# Patient Record
Sex: FEMALE | Race: WHITE | Employment: OTHER | URBAN - METROPOLITAN AREA
[De-identification: names, ages, dates, MRNs, and addresses within clinical notes are randomized per-mention and may not be internally consistent; named-entity substitution may affect disease eponyms.]

---

## 2017-01-29 DIAGNOSIS — E78.00 HYPERCHOLESTEROLEMIA: ICD-10-CM

## 2017-01-29 RX ORDER — ATORVASTATIN CALCIUM 20 MG/1
TABLET, FILM COATED ORAL
Qty: 90 TAB | Refills: 1 | Status: SHIPPED | OUTPATIENT
Start: 2017-01-29 | End: 2017-12-08 | Stop reason: SDUPTHER

## 2017-04-14 ENCOUNTER — OFFICE VISIT (OUTPATIENT)
Dept: INTERNAL MEDICINE CLINIC | Age: 67
End: 2017-04-14

## 2017-04-14 ENCOUNTER — TELEPHONE (OUTPATIENT)
Dept: INTERNAL MEDICINE CLINIC | Age: 67
End: 2017-04-14

## 2017-04-14 ENCOUNTER — HOSPITAL ENCOUNTER (OUTPATIENT)
Dept: GENERAL RADIOLOGY | Age: 67
Discharge: HOME | End: 2017-04-14
Attending: INTERNAL MEDICINE
Payer: MEDICARE

## 2017-04-14 VITALS
SYSTOLIC BLOOD PRESSURE: 136 MMHG | BODY MASS INDEX: 23.7 KG/M2 | WEIGHT: 151 LBS | RESPIRATION RATE: 16 BRPM | DIASTOLIC BLOOD PRESSURE: 72 MMHG | OXYGEN SATURATION: 99 % | HEART RATE: 48 BPM | HEIGHT: 67 IN | TEMPERATURE: 98.8 F

## 2017-04-14 DIAGNOSIS — M25.561 RIGHT MEDIAL KNEE PAIN: ICD-10-CM
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
## 2017-04-14 DIAGNOSIS — I10 ESSENTIAL HYPERTENSION: ICD-10-CM

## 2017-04-14 DIAGNOSIS — S13.9XXA CERVICAL SPRAIN, INITIAL ENCOUNTER: Primary | ICD-10-CM

## 2017-04-14 DIAGNOSIS — S13.9XXA CERVICAL SPRAIN, INITIAL ENCOUNTER: ICD-10-CM

## 2017-04-14 PROCEDURE — 72050 X-RAY EXAM NECK SPINE 4/5VWS: CPT

## 2017-04-14 PROCEDURE — 73562 X-RAY EXAM OF KNEE 3: CPT

## 2017-04-14 RX ORDER — CYCLOBENZAPRINE HCL 10 MG
10 TABLET ORAL
Qty: 30 TAB | Refills: 0 | Status: SHIPPED | OUTPATIENT
Start: 2017-04-14 | End: 2017-12-08 | Stop reason: ALTCHOICE

## 2017-04-14 RX ORDER — AZELASTINE HYDROCHLORIDE AND FLUTICASONE PROPIONATE 137; 50 UG/1; UG/1
2 SPRAY, METERED NASAL AS NEEDED
Refills: 6 | COMMUNITY
Start: 2017-02-01 | End: 2020-03-10
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
## 2017-04-14 NOTE — PROGRESS NOTES
Patient notified no acute findings seen on neither X-ray, advised moderate arthritis seen on knee x-ray and mild arthritis on c-spine x-ray. Patient notified to use the Flexeril as planned.
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
## 2017-04-14 NOTE — PROGRESS NOTES
Notify her mild to mod arthritis in knee but no acute findings and c spine also arthritis no acute findings

## 2017-04-20 ENCOUNTER — TELEPHONE (OUTPATIENT)
Dept: INTERNAL MEDICINE CLINIC | Age: 67
End: 2017-04-20
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
## 2017-04-21 ENCOUNTER — OFFICE VISIT (OUTPATIENT)
Dept: INTERNAL MEDICINE CLINIC | Age: 67
End: 2017-04-21

## 2017-04-21 VITALS
SYSTOLIC BLOOD PRESSURE: 152 MMHG | HEART RATE: 52 BPM | WEIGHT: 151.6 LBS | TEMPERATURE: 98.7 F | RESPIRATION RATE: 16 BRPM | BODY MASS INDEX: 23.79 KG/M2 | OXYGEN SATURATION: 99 % | HEIGHT: 67 IN | DIASTOLIC BLOOD PRESSURE: 78 MMHG

## 2017-04-21 DIAGNOSIS — R00.1 BRADYCARDIA: ICD-10-CM

## 2017-04-21 DIAGNOSIS — M54.50 RIGHT-SIDED LOW BACK PAIN WITHOUT SCIATICA, UNSPECIFIED CHRONICITY: Primary | ICD-10-CM

## 2017-04-21 RX ORDER — ATENOLOL 50 MG/1
50 TABLET ORAL DAILY
Qty: 90 TAB | Refills: 2 | Status: SHIPPED | OUTPATIENT
Start: 2017-04-21 | End: 2017-12-30 | Stop reason: SDUPTHER

## 2017-04-21 NOTE — MR AVS SNAPSHOT
Visit Information Date & Time Provider Department Dept. Phone Encounter #  
 4/21/2017  7:45 AM Amelia Ross MD [facility] [address] [id number] Your Appointments 10/19/2017  2:30 PM  
Medicare Physical with Amelia Ross MD  
[facility]) Appt Note: welcome to medicare [address] [facility] [address] [address]  
[phone]  
  
   
 [address] [facility] [address] Upcoming Health Maintenance Date Due Hepatitis C Screening 1950 COLONOSCOPY 9/25/1969 DTaP/Tdap/Td series (1 - Tdap) 9/25/1972 ZOSTER VACCINE AGE 60> 9/25/2011 INFLUENZA AGE 9 TO ADULT 8/1/2016 GLAUCOMA SCREENING Q2Y 9/25/2016 Pneumococcal 65+ Low/Medium Risk (1 of 2 - PCV13) 9/25/2016 MEDICARE YEARLY EXAM 9/25/2016 BREAST CANCER SCRN MAMMOGRAM 9/8/2018 Allergies as of 4/21/2017  Review Complete On: 4/21/2017 By: Amelia Ross MD  
  
 Severity Noted Reaction Type Reactions Codeine  07/28/2009    Nausea and Vomiting Current Immunizations  Reviewed on 1/9/2013 No immunizations on file. Not reviewed this visit You Were Diagnosed With   
  
 Codes Comments Right-sided low back pain without sciatica, unspecified chronicity    -  Primary ICD-10-CM: M54.5 ICD-9-CM: 724.2 Bradycardia     ICD-10-CM: R00.1 ICD-9-CM: 427.89 Vitals BP Pulse Temp Resp Height(growth percentile) Weight(growth percentile) 152/78 (BP 1 Location: Left arm, BP Patient Position: Sitting) (!) 52 98.7 °F (37.1 °C) (Oral) 16 5' 7\" (1.702 m) 151 lb 9.6 oz (68.8 kg) SpO2 BMI OB Status Smoking Status 99% 23.74 kg/m2 Postmenopausal Passive Smoke Exposure - Never Smoker Vitals History BMI and BSA Data Body Mass Index Body Surface Area  
 23.74 kg/m 2 1.8 m 2 Preferred Pharmacy Pharmacy Name Phone [facility]/PHARMACY #[id number] Carlo De La Torre, [address] [facility] [phone] Your Updated Medication List  
  
   
This list is accurate as of: 4/21/17  8:11 AM.  Always use your most recent med list.  
  
  
  
  
 aspirin delayed-release 81 mg tablet Take  by mouth daily. atenolol 50 mg tablet Commonly known as:  TENORMIN Take 1 Tab by mouth daily. atorvastatin 20 mg tablet Commonly known as:  LIPITOR  
TAKE 1 TABLET DAILY CLARITIN 10 mg tablet Generic drug:  loratadine Take 10 mg by mouth as needed. cyclobenzaprine 10 mg tablet Commonly known as:  FLEXERIL Take 1 Tab by mouth nightly. DYMISTA 137-50 mcg/spray [place] Generic drug:  azelastine-fluticasone 2 Sprays by Nasal route as needed. hydroCHLOROthiazide 12.5 mg tablet Commonly known as:  HYDRODIURIL  
TAKE 1 TABLET DAILY  
  
 levothyroxine 100 mcg tablet Commonly known as:  SYNTHROID  
TAKE 1 TABLET DAILY BEFORE BREAKFAST  
  
 montelukast 10 mg tablet Commonly known as:  SINGULAIR  
  
 * MOTRIN 800 mg tablet Generic drug:  ibuprofen Take  by mouth every six (6) hours as needed for Pain. * ibuprofen 800 mg tablet Commonly known as:  MOTRIN Take 1 Tab by mouth every eight (8) hours as needed for Pain. raNITIdine 150 mg tablet Commonly known as:  ZANTAC TAKE 1 TABLET TWICE A DAY  
  
 telmisartan 40 mg tablet Commonly known as:  MICARDIS  
TAKE 1 TABLET DAILY  
  
 VITAMIN D2 PO Take 4,000 Units by mouth daily. * Notice: This list has 2 medication(s) that are the same as other medications prescribed for you. Read the directions carefully, and ask your doctor or other care provider to review them with you. Prescriptions Sent to Pharmacy Refills  
 atenolol (TENORMIN) 50 mg tablet 2 Sig: Take 1 Tab by mouth daily.   
 Class: Normal  
 Pharmacy: [address], [address] [place] [address] AT [address] #: [phone] Route: Oral  
  
Introducing [facility] & HEALTH SERVICES! Dear Jorge Simmons: Thank you for requesting a [email or web address] account. Our records indicate that you already have an active [email or web address] account. You can access your account anytime at [email or web address] Did you know that you can access your hospital and ER discharge instructions at any time in [email or web address]? You can also review all of your test results from your hospital stay or ER visit. Additional Information If you have questions, please visit the Frequently Asked Questions section of the [email or web address] website at [email or web address] Remember, [email or web address] is NOT to be used for urgent needs. For medical emergencies, dial 911. Now available from your iPhone and Android! Please provide this summary of care documentation to your next provider. Your primary care clinician is listed as Nomi Valdes. If you have any questions after today's visit, please call [phone].
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
## 2017-05-06 RX ORDER — ATENOLOL 25 MG/1
25 TABLET ORAL DAILY
Qty: 30 TAB | Refills: 5 | Status: SHIPPED | OUTPATIENT
Start: 2017-05-06 | End: 2017-10-31 | Stop reason: SDUPTHER

## 2017-10-18 ENCOUNTER — TELEPHONE (OUTPATIENT)
Dept: INTERNAL MEDICINE CLINIC | Age: 67
End: 2017-10-18

## 2017-10-18 NOTE — TELEPHONE ENCOUNTER
Patient unfortunately has to cancel 10/19 AMW appointment.  had an emergency triple by pass heart surgery 10/17. Echo Molina wanted to make sure PCP is aware that she would not just cancel .  Patient contact  [phone]

## 2017-10-31 RX ORDER — ATENOLOL 25 MG/1
TABLET ORAL
Qty: 30 TAB | Refills: 1 | Status: SHIPPED | OUTPATIENT
Start: 2017-10-31 | End: 2017-12-08 | Stop reason: SDUPTHER

## 2017-11-07 DIAGNOSIS — I10 ESSENTIAL HYPERTENSION WITH GOAL BLOOD PRESSURE LESS THAN 130/80: ICD-10-CM

## 2017-11-07 RX ORDER — HYDROCHLOROTHIAZIDE 12.5 MG/1
TABLET ORAL
Qty: 90 TAB | Refills: 1 | Status: SHIPPED | OUTPATIENT
Start: 2017-11-07 | End: 2017-12-08 | Stop reason: SDUPTHER

## 2017-12-08 ENCOUNTER — OFFICE VISIT (OUTPATIENT)
Dept: INTERNAL MEDICINE CLINIC | Age: 67
End: 2017-12-08

## 2017-12-08 VITALS
SYSTOLIC BLOOD PRESSURE: 138 MMHG | DIASTOLIC BLOOD PRESSURE: 70 MMHG | WEIGHT: 148 LBS | TEMPERATURE: 99.1 F | OXYGEN SATURATION: 98 % | RESPIRATION RATE: 16 BRPM | HEIGHT: 67 IN | BODY MASS INDEX: 23.23 KG/M2 | HEART RATE: 57 BPM

## 2017-12-08 DIAGNOSIS — Z00.00 MEDICARE ANNUAL WELLNESS VISIT, INITIAL: Primary | ICD-10-CM

## 2017-12-08 DIAGNOSIS — E78.00 HYPERCHOLESTEROLEMIA: ICD-10-CM

## 2017-12-08 DIAGNOSIS — I10 ESSENTIAL HYPERTENSION: ICD-10-CM

## 2017-12-08 DIAGNOSIS — M53.3 CHRONIC RIGHT SI JOINT PAIN: ICD-10-CM

## 2017-12-08 DIAGNOSIS — G89.29 CHRONIC RIGHT SI JOINT PAIN: ICD-10-CM

## 2017-12-08 DIAGNOSIS — I73.9 PVD (PERIPHERAL VASCULAR DISEASE) (HCC): ICD-10-CM

## 2017-12-08 DIAGNOSIS — E03.4 HYPOTHYROIDISM DUE TO ACQUIRED ATROPHY OF THYROID: ICD-10-CM

## 2017-12-08 DIAGNOSIS — I10 ESSENTIAL HYPERTENSION WITH GOAL BLOOD PRESSURE LESS THAN 130/80: ICD-10-CM

## 2017-12-08 DIAGNOSIS — Z12.11 COLON CANCER SCREENING: ICD-10-CM

## 2017-12-08 DIAGNOSIS — K21.9 GERD WITHOUT ESOPHAGITIS: ICD-10-CM

## 2017-12-08 DIAGNOSIS — R73.01 IFG (IMPAIRED FASTING GLUCOSE): ICD-10-CM

## 2017-12-08 DIAGNOSIS — Z12.39 BREAST CANCER SCREENING: ICD-10-CM

## 2017-12-08 RX ORDER — HYDROCHLOROTHIAZIDE 12.5 MG/1
TABLET ORAL
Qty: 90 TAB | Refills: 2 | Status: SHIPPED | OUTPATIENT
Start: 2017-12-08 | End: 2018-07-26 | Stop reason: DRUGHIGH

## 2017-12-08 RX ORDER — LEVOTHYROXINE SODIUM 100 UG/1
TABLET ORAL
Qty: 90 TAB | Refills: 1 | Status: SHIPPED | OUTPATIENT
Start: 2017-12-08 | End: 2018-06-29 | Stop reason: SDUPTHER

## 2017-12-08 RX ORDER — RANITIDINE 150 MG/1
TABLET, FILM COATED ORAL
Qty: 180 TAB | Refills: 2 | Status: SHIPPED | OUTPATIENT
Start: 2017-12-08 | End: 2018-03-08 | Stop reason: ALTCHOICE

## 2017-12-08 RX ORDER — ATENOLOL 25 MG/1
TABLET ORAL
Qty: 90 TAB | Refills: 2 | Status: SHIPPED | OUTPATIENT
Start: 2017-12-08 | End: 2018-01-03 | Stop reason: RX

## 2017-12-08 RX ORDER — ATORVASTATIN CALCIUM 20 MG/1
TABLET, FILM COATED ORAL
Qty: 90 TAB | Refills: 1 | Status: SHIPPED | OUTPATIENT
Start: 2017-12-08 | End: 2017-12-30 | Stop reason: SDUPTHER

## 2017-12-08 RX ORDER — TELMISARTAN 40 MG/1
TABLET ORAL
Qty: 90 TAB | Refills: 2 | Status: SHIPPED | OUTPATIENT
Start: 2017-12-08 | End: 2018-09-25 | Stop reason: SDUPTHER

## 2017-12-08 NOTE — PATIENT INSTRUCTIONS
Well Visit, Over 72: Care Instructions  Your Care Instructions    Physical exams can help you stay healthy. Your doctor has checked your overall health and may have suggested ways to take good care of yourself. He or she also may have recommended tests. At home, you can help prevent illness with healthy eating, regular exercise, and other steps. Follow-up care is a key part of your treatment and safety. Be sure to make and go to all appointments, and call your doctor if you are having problems. It's also a good idea to know your test results and keep a list of the medicines you take. How can you care for yourself at home? · Reach and stay at a healthy weight. This will lower your risk for many problems, such as obesity, diabetes, heart disease, and high blood pressure. · Get at least 30 minutes of exercise on most days of the week. Walking is a good choice. You also may want to do other activities, such as running, swimming, cycling, or playing tennis or team sports. · Do not smoke. Smoking can make health problems worse. If you need help quitting, talk to your doctor about stop-smoking programs and medicines. These can increase your chances of quitting for good. · Protect your skin from too much sun. When you're outdoors from 10 a.m. to 4 p.m., stay in the shade or cover up with clothing and a hat with a wide brim. Wear sunglasses that block UV rays. Even when it's cloudy, put broad-spectrum sunscreen (SPF 30 or higher) on any exposed skin. · See a dentist one or two times a year for checkups and to have your teeth cleaned. · Wear a seat belt in the car. · Limit alcohol to 2 drinks a day for men and 1 drink a day for women. Too much alcohol can cause health problems. Follow your doctor's advice about when to have certain tests. These tests can spot problems early. For men and women  · Cholesterol.  Your doctor will tell you how often to have this done based on your overall health and other things that can increase your risk for heart attack and stroke. · Blood pressure. Have your blood pressure checked during a routine doctor visit. Your doctor will tell you how often to check your blood pressure based on your age, your blood pressure results, and other factors. · Diabetes. Ask your doctor whether you should have tests for diabetes. · Vision. Experts recommend that you have yearly exams for glaucoma and other age-related eye problems. · Hearing. Tell your doctor if you notice any change in your hearing. You can have tests to find out how well you hear. · Colon cancer tests. Keep having colon cancer tests as your doctor recommends. You can have one of several types of tests. · Heart attack and stroke risk. At least every 4 to 6 years, you should have your risk for heart attack and stroke assessed. Your doctor uses factors such as your age, blood pressure, cholesterol, and whether you smoke or have diabetes to show what your risk for a heart attack or stroke is over the next 10 years. · Osteoporosis. Talk to your doctor about whether you should have a bone density test to find out whether you have thinning bones. Also ask your doctor about whether you should take calcium and vitamin D supplements. For women  · Pap test and pelvic exam. You may no longer need a Pap test. Talk with your doctor about whether to stop or continue to have Pap tests. · Breast exam and mammogram. Ask how often you should have a mammogram, which is an X-ray of your breasts. A mammogram can spot breast cancer before it can be felt and when it is easiest to treat. · Thyroid disease. Talk to your doctor about whether to have your thyroid checked as part of a regular physical exam. Women have an increased chance of a thyroid problem. For men  · Prostate exam. Talk to your doctor about whether you should have a blood test (called a PSA test) for prostate cancer.  Experts disagree on whether men should have this test. Some experts recommend that you discuss the benefits and risks of the test with your doctor. · Abdominal aortic aneurysm. Ask your doctor whether you should have a test to check for an aneurysm. You may need a test if you ever smoked or if your parent, brother, sister, or child has had an aneurysm. When should you call for help? Watch closely for changes in your health, and be sure to contact your doctor if you have any problems or symptoms that concern you. Where can you learn more? Go to http://jeff-renetta.info/. Enter V061 in the search box to learn more about \"Well Visit, Over 65: Care Instructions. \"  Current as of: May 12, 2017  Content Version: 11.4  © 0344-1740 [employer]. Care instructions adapted under license by [employer] (which disclaims liability or warranty for this information). If you have questions about a medical condition or this instruction, always ask your healthcare professional. [place] [address] any warranty or liability for your use of this information. Medicare Wellness Visit, Female    The best way to live healthy is to have a healthy lifestyle by eating a well-balanced diet, exercising regularly, limiting alcohol and stopping smoking. Regular physical exams and screening tests are another way to keep healthy. Preventive exams provided by your health care provider can find health problems before they become diseases or illnesses. Preventive services including immunizations, screening tests, monitoring and exams can help you take care of your own health. All people over age 72 should have a pneumovax  and and a prevnar shot to prevent pneumonia. These are once in a lifetime unless you and your provider decide differently. All people over 65 should have a yearly flu shot and a tetanus vaccine every 10 years.     A bone mass density to screen for osteoporosis or thinning of the bones should be done every 2 years after 72.    Screening for diabetes mellitus with a blood sugar test should be done every year. Glaucoma is a disease of the eye due to increased ocular pressure that can lead to blindness and it should be done every year by an eye professional.    Cardiovascular screening tests that check for elevated lipids (fatty part of blood) which can lead to heart disease and strokes should be done every 5 years. Colorectal screening that evaluates for blood or polyps in your colon should be done yearly as a stool test or every five years as a flexible sigmoidoscope or every 10 years as a colonoscopy up to age 76. Breast cancer screening with a mammogram is recommended biennially  for women age 54-69. Screening for cervical cancer with a pap smear and pelvic exam is recommended for women after age 72 years every 2 years up to age 79 or when the provider and patient decide to stop. If there is a history of cervical abnormalities or other increased risk for cancer then the test is recommended yearly. Hepatitis C screening is also recommended for anyone born between 80 through [address]. A shingles vaccine is also recommended once in a lifetime after age 61. Your Medicare Wellness Exam is recommended annually.     Here is a list of your current Health Maintenance items with a due date:  Health Maintenance Due   Topic Date Due    Hepatitis C Test  1950    Colonoscopy  09/25/1969    DTaP/Tdap/Td  (1 - Tdap) 09/25/1972    Shingles Vaccine  07/25/2011    Glaucoma Screening   09/25/2016    Pneumococcal Vaccine (1 of 2 - PCV13) 09/25/2016    Annual Well Visit  09/25/2016    Flu Vaccine  08/01/2017

## 2017-12-08 NOTE — MR AVS SNAPSHOT
Visit Information Date & Time Provider Department Dept. Phone Encounter #  
 12/8/2017 11:00 AM Jagruti Sargent MD [facility] [address] [place] [id number] Upcoming Health Maintenance Date Due Hepatitis C Screening 1950 COLONOSCOPY 9/25/1969 DTaP/Tdap/Td series (1 - Tdap) 9/25/1972 ZOSTER VACCINE AGE 60> 7/25/2011 GLAUCOMA SCREENING Q2Y 9/25/2016 Pneumococcal 65+ Low/Medium Risk (1 of 2 - PCV13) 9/25/2016 MEDICARE YEARLY EXAM 9/25/2016 Influenza Age 5 to Adult 8/1/2017 BREAST CANCER SCRN MAMMOGRAM 9/8/2018 Allergies as of 12/8/2017  Review Complete On: 12/8/2017 By: Jagruti Sargent MD  
  
 Severity Noted Reaction Type Reactions Codeine  07/28/2009    Nausea and Vomiting Current Immunizations  Reviewed on 12/8/2017 No immunizations on file. Reviewed by Jagruti Sargent MD on 12/8/2017 at 11:00 AM  
You Were Diagnosed With   
  
 Codes Comments Medicare annual wellness visit, initial    -  Primary ICD-10-CM: Z00.00 ICD-9-CM: V70.0 Breast cancer screening     ICD-10-CM: Z12.31 
ICD-9-CM: V76.10 Colon cancer screening     ICD-10-CM: Z12.11 ICD-9-CM: V76.51 Essential hypertension     ICD-10-CM: I10 
ICD-9-CM: 401.9 Hypercholesterolemia     ICD-10-CM: E78.00 ICD-9-CM: 272.0 Hypothyroidism due to acquired atrophy of thyroid     ICD-10-CM: E03.4 ICD-9-CM: 244.8, 246.8 IFG (impaired fasting glucose)     ICD-10-CM: R73.01 
ICD-9-CM: 790.21 Essential hypertension with goal blood pressure less than 130/80     ICD-10-CM: I10 
ICD-9-CM: 401.9 GERD without esophagitis     ICD-10-CM: K21.9 ICD-9-CM: 530.81 Vitals BP Pulse Temp Resp Height(growth percentile) Weight(growth percentile) 138/70 (BP 1 Location: Left arm, BP Patient Position: Sitting) (!) 57 99.1 °F (37.3 °C) (Oral) 16 5' 7\" (1.702 m) 148 lb (67.1 kg) SpO2 BMI OB Status Smoking Status 98% 23.18 kg/m2 Postmenopausal Passive Smoke Exposure - Never Smoker Vitals History BMI and BSA Data Body Mass Index Body Surface Area  
 23.18 kg/m 2 1.78 m 2 Preferred Pharmacy Pharmacy Name Phone [facility]/PHARMACY #[id number] Sonny Andujar [facility] [phone] Your Updated Medication List  
  
   
This list is accurate as of: 12/8/17 11:18 AM.  Always use your most recent med list.  
  
  
  
  
 aspirin delayed-release 81 mg tablet Take  by mouth daily. * atenolol 50 mg tablet Commonly known as:  TENORMIN Take 1 Tab by mouth daily. * atenolol 25 mg tablet Commonly known as:  TENORMIN  
TAKE 1 TABLET BY MOUTH EVERY DAY TAKE WITH THE 50 MG DOSE  
  
 atorvastatin 20 mg tablet Commonly known as:  LIPITOR  
TAKE 1 TABLET DAILY CLARITIN 10 mg tablet Generic drug:  loratadine Take 10 mg by mouth as needed. DYMISTA 137-50 mcg/spray [place] Generic drug:  azelastine-fluticasone 2 Sprays by Nasal route as needed. hydroCHLOROthiazide 12.5 mg tablet Commonly known as:  HYDRODIURIL  
TAKE 1 TABLET BY MOUTH EVERY DAY  
  
 ibuprofen 800 mg tablet Commonly known as:  MOTRIN Take 1 Tab by mouth every eight (8) hours as needed for Pain.  
  
 levothyroxine 100 mcg tablet Commonly known as:  SYNTHROID  
TAKE 1 TABLET DAILY BEFORE BREAKFAST  
  
 montelukast 10 mg tablet Commonly known as:  SINGULAIR  
  
 raNITIdine 150 mg tablet Commonly known as:  ZANTAC TAKE 1 TABLET TWICE A DAY  
  
 telmisartan 40 mg tablet Commonly known as:  MICARDIS  
TAKE 1 TABLET DAILY  
  
 VITAMIN D2 PO Take 4,000 Units by mouth daily. * Notice: This list has 2 medication(s) that are the same as other medications prescribed for you. Read the directions carefully, and ask your doctor or other care provider to review them with you. Prescriptions Sent to Pharmacy Refills hydroCHLOROthiazide (HYDRODIURIL) 12.5 mg tablet 2 Sig: TAKE 1 TABLET BY MOUTH EVERY DAY Class: Normal  
 Pharmacy: [facility]/pharmacy #[id number] - [address] Ph #: [phone]  
 atenolol (TENORMIN) 25 mg tablet 2 Sig: TAKE 1 TABLET BY MOUTH EVERY DAY TAKE WITH THE 50 MG DOSE Class: Normal  
 Pharmacy: [facility]/pharmacy #[id number] [address] Ph #: [phone]  
 atorvastatin (LIPITOR) 20 mg tablet 1 Sig: TAKE 1 TABLET DAILY Class: Normal  
 Pharmacy: [facility]/pharmacy #[id number] [address] Ph #: [phone]  
 levothyroxine (SYNTHROID) 100 mcg tablet 1 Sig: TAKE 1 TABLET DAILY BEFORE BREAKFAST Class: Normal  
 Pharmacy: [facility]/pharmacy #[id number] [address] Ph #: [phone]  
 telmisartan (MICARDIS) 40 mg tablet 2 Sig: TAKE 1 TABLET DAILY Class: Normal  
 Pharmacy: [facility]/pharmacy #[id number] [address] Ph #: [phone]  
 raNITIdine (ZANTAC) 150 mg tablet 2 Sig: TAKE 1 TABLET TWICE A DAY Class: Normal  
 Pharmacy: [facility]/pharmacy [facility], [address] [place] [address] Ph #: [phone] We Performed the Following AMB POC EKG ROUTINE W/ 12 LEADS, INTER & REP [54636 CPT(R)] OCCULT BLOOD, IMMUNOASSAY (FIT) [id number] CPT(R)] To-Do List   
 12/08/2017 Imaging:  [facility] 3D MLIA W MAMMO BI SCREENING INCL CAD Patient Instructions Well Visit, Over 72: Care Instructions Your Care Instructions Physical exams can help you stay healthy. Your doctor has checked your overall health and may have suggested ways to take good care of yourself. He or she also may have recommended tests. At home, you can help prevent illness with healthy eating, regular exercise, and other steps. Follow-up care is a key part of your treatment and safety. Be sure to make and go to all appointments, and call your doctor if you are having problems. It's also a good idea to know your test results and keep a list of the medicines you take. How can you care for yourself at home? · Reach and stay at a healthy weight. This will lower your risk for many problems, such as obesity, diabetes, heart disease, and high blood pressure. · Get at least 30 minutes of exercise on most days of the week. Walking is a good choice. You also may want to do other activities, such as running, swimming, cycling, or playing tennis or team sports. · Do not smoke. Smoking can make health problems worse. If you need help quitting, talk to your doctor about stop-smoking programs and medicines. These can increase your chances of quitting for good. · Protect your skin from too much sun. When you're outdoors from 10 a.m. to 4 p.m., stay in the shade or cover up with clothing and a hat with a wide brim. Wear sunglasses that block UV rays. Even when it's cloudy, put broad-spectrum sunscreen (SPF 30 or higher) on any exposed skin. · See a dentist one or two times a year for checkups and to have your teeth cleaned. · Wear a seat belt in the car. · Limit alcohol to 2 drinks a day for men and 1 drink a day for women. Too much alcohol can cause health problems. Follow your doctor's advice about when to have certain tests. These tests can spot problems early. For men and women · Cholesterol. Your doctor will tell you how often to have this done based on your overall health and other things that can increase your risk for heart attack and stroke. · Blood pressure. Have your blood pressure checked during a routine doctor visit. Your doctor will tell you how often to check your blood pressure based on your age, your blood pressure results, and other factors. · Diabetes. Ask your doctor whether you should have tests for diabetes. · Vision. Experts recommend that you have yearly exams for glaucoma and other age-related eye problems. · Hearing. Tell your doctor if you notice any change in your hearing. You can have tests to find out how well you hear. · Colon cancer tests. Keep having colon cancer tests as your doctor recommends. You can have one of several types of tests. · Heart attack and stroke risk. At least every 4 to 6 years, you should have your risk for heart attack and stroke assessed. Your doctor uses factors such as your age, blood pressure, cholesterol, and whether you smoke or have diabetes to show what your risk for a heart attack or stroke is over the next 10 years. · Osteoporosis. Talk to your doctor about whether you should have a bone density test to find out whether you have thinning bones. Also ask your doctor about whether you should take calcium and vitamin D supplements. For women · Pap test and pelvic exam. You may no longer need a Pap test. Talk with your doctor about whether to stop or continue to have Pap tests. · Breast exam and mammogram. Ask how often you should have a mammogram, which is an X-ray of your breasts. A mammogram can spot breast cancer before it can be felt and when it is easiest to treat. · Thyroid disease. Talk to your doctor about whether to have your thyroid checked as part of a regular physical exam. Women have an increased chance of a thyroid problem. For men · Prostate exam. Talk to your doctor about whether you should have a blood test (called a PSA test) for prostate cancer. Experts disagree on whether men should have this test. Some experts recommend that you discuss the benefits and risks of the test with your doctor. · Abdominal aortic aneurysm. Ask your doctor whether you should have a test to check for an aneurysm. You may need a test if you ever smoked or if your parent, brother, sister, or child has had an aneurysm. When should you call for help? Watch closely for changes in your health, and be sure to contact your doctor if you have any problems or symptoms that concern you. Where can you learn more? Go to http://jeff-renetta.info/. Enter W848 in the search box to learn more about \"Well Visit, Over 65: Care Instructions. \" Current as of: May 12, 2017 Content Version: 11.4 © 5009-1184 [employer]. Care instructions adapted under license by [employer] (which disclaims liability or warranty for this information). If you have questions about a medical condition or this instruction, always ask your healthcare professional. [place] [address] any warranty or liability for your use of this information. Introducing [facility]! Dear Sherryle Fire: Thank you for requesting a Eve account. Our records indicate that you already have an active Eve account. You can access your account anytime at [email or web address] Did you know that you can access your hospital and ER discharge instructions at any time in Eve? You can also review all of your test results from your hospital stay or ER visit. Additional Information If you have questions, please visit the Frequently Asked Questions section of the Eve website at [email or web address] Remember, Eve is NOT to be used for urgent needs. For medical emergencies, dial 911. Now available from your iPhone and Android! Please provide this summary of care documentation to your next provider. Your primary care clinician is listed as Nomi Valdes. If you have any questions after today's visit, please call [phone].

## 2017-12-08 NOTE — PROGRESS NOTES
HISTORY OF PRESENT ILLNESS  Ronnie Chang is a 78 y.o. female. HPI  Christina Cunningham is seen at follow up. Multiple issues.  has just had cardiac bypass. She has not smoked for a year and is congratulated on this. She's not exercising, but hopes to start in March. Strongly encouraged to pursue this. Issues:  1. Hypertension, reasonably controlled on current meds. No side effects noted. No chest pain or shortness of breath. 2. History of abnormal chest CT. Has seen Dr. Jeanette Marrero in the past. I am suggesting another low dose lung CT because of history of tobacco use. She prefers to talk to pulmonary first.  3. Chronic right SI area discomfort. Previous xrays have confirmed bursitis and arthritis. It bothers her mostly at night. Does not radiate or impact her in the day. I will refer for PT.  4. Hypothyroidism. Recent TSH at her office was 0.55 with a T4 of 1.35.  5. Dyslipidemia, on Lipitor. Cholesterol of 183, HDL 62, triglycerides 197, LDL 82. Continue Lipitor and discussed increasing exercise for this. 6. Risk factors for coronary disease. She would like carotid screening. She is not having any dizziness or visual changes. Review of Systems   Constitutional: Negative for chills, fever and weight loss. Respiratory: Negative for cough, shortness of breath and wheezing. Cardiovascular: Negative for chest pain, palpitations, orthopnea, leg swelling and PND. Gastrointestinal: Positive for heartburn. Negative for abdominal pain, blood in stool, constipation, diarrhea, melena, nausea and vomiting. Genitourinary: Negative for dysuria. Musculoskeletal: Positive for back pain (low back discomfort chronic). Negative for myalgias. Neurological: Negative for dizziness and headaches. Psychiatric/Behavioral: Negative for depression. The patient is nervous/anxious. All other systems reviewed and are negative. Physical Exam   Constitutional: She is oriented to person, place, and time.  She appears well-developed and well-nourished. HENT:   Head: Normocephalic. Right Ear: Tympanic membrane, external ear and ear canal normal.   Left Ear: Tympanic membrane, external ear and ear canal normal.   Nose: Nose normal.   Mouth/Throat: Oropharynx is clear and moist and mucous membranes are normal. No oropharyngeal exudate. Eyes: Conjunctivae are normal. Pupils are equal, round, and reactive to light. Right eye exhibits no discharge. Left eye exhibits no discharge. Neck: Normal range of motion. Neck supple. Carotid bruit is present. Cardiovascular: Normal rate, regular rhythm and normal heart sounds. Pulmonary/Chest: Effort normal and breath sounds normal. No respiratory distress. She has no wheezes. She has no rales. Breast exam bilaterally without masses axillary nodes or discharge. BSE reviewed      Abdominal: Soft. Bowel sounds are normal. She exhibits no distension. There is no tenderness. Musculoskeletal: She exhibits no edema. Lymphadenopathy:     She has no cervical adenopathy. Neurological: She is alert and oriented to person, place, and time. Skin: Skin is warm and dry. No rash noted. Psychiatric: She has a normal mood and affect. Her behavior is normal.   Nursing note and vitals reviewed. ASSESSMENT and PLAN  Diagnoses and all orders for this visit:    1. Medicare annual wellness visit, initial  Advised pneumovax and flu-she declines  Advised low dose ct for lung ca screen she declines  2. Breast cancer screening  -     [facility] 3D MILA W MAMMO BI SCREENING INCL CAD; Future    3. Colon cancer screening  -     OCCULT BLOOD, IMMUNOASSAY (FIT)    4. Essential hypertension-cont meds    5. Hypercholesterolemia  -     atorvastatin (LIPITOR) 20 mg tablet; TAKE 1 TABLET DAILY    6. Hypothyroidism due to acquired atrophy of thyroid  -     levothyroxine (SYNTHROID) 100 mcg tablet; TAKE 1 TABLET DAILY BEFORE BREAKFAST    7. IFG (impaired fasting glucose)-she will repeat glucose at work    8. Essential hypertension with goal blood pressure less than 130/80  -     hydroCHLOROthiazide (HYDRODIURIL) 12.5 mg tablet; TAKE 1 TABLET BY MOUTH EVERY DAY  -     atenolol (TENORMIN) 25 mg tablet; TAKE 1 TABLET BY MOUTH EVERY DAY TAKE WITH THE 50 MG DOSE  -     telmisartan (MICARDIS) 40 mg tablet; TAKE 1 TABLET DAILY  -     AMB POC EKG ROUTINE W/ 12 LEADS, INTER & REP    9. GERD without esophagitis  -     raNITIdine (ZANTAC) 150 mg tablet; TAKE 1 TABLET TWICE A DAY    10. Chronic right SI joint pain    11. PVD (peripheral vascular disease) (HCC)-left sided plaque seen in 2016 cont off tobacco and on lipitor and asa  -     DUPLEX CAROTID BILATERAL; Future          This is an Initial Medicare Annual Wellness Exam (AWV) (Performed 12 months after IPPE or effective date of Medicare Part B enrollment, Once in a lifetime)    I have reviewed the patient's medical history in detail and updated the computerized patient record. History     Past Medical History:   Diagnosis Date    Diverticulitis 7/28/2009    HTN 7/28/2009    Hypercholesterolemia 7/28/2009    Hypothyroid 7/28/2009      Past Surgical History:   Procedure Laterality Date    HX ORTHOPAEDIC      anterior cervical discectomy and fusion     Current Outpatient Prescriptions   Medication Sig Dispense Refill    hydroCHLOROthiazide (HYDRODIURIL) 12.5 mg tablet TAKE 1 TABLET BY MOUTH EVERY DAY 90 Tab 2    atenolol (TENORMIN) 25 mg tablet TAKE 1 TABLET BY MOUTH EVERY DAY TAKE WITH THE 50 MG DOSE 90 Tab 2    atorvastatin (LIPITOR) 20 mg tablet TAKE 1 TABLET DAILY 90 Tab 1    levothyroxine (SYNTHROID) 100 mcg tablet TAKE 1 TABLET DAILY BEFORE BREAKFAST 90 Tab 1    telmisartan (MICARDIS) 40 mg tablet TAKE 1 TABLET DAILY 90 Tab 2    raNITIdine (ZANTAC) 150 mg tablet TAKE 1 TABLET TWICE A  Tab 2    atenolol (TENORMIN) 50 mg tablet Take 1 Tab by mouth daily. 90 Tab 2    DYMISTA 137-50 mcg/spray spry 2 Sprays by Nasal route as needed.   6    ibuprofen (MOTRIN) 800 mg tablet Take 1 Tab by mouth every eight (8) hours as needed for Pain. 40 Tab 0    loratadine (CLARITIN) 10 mg tablet Take 10 mg by mouth as needed.  montelukast (SINGULAIR) 10 mg tablet   6    aspirin delayed-release 81 mg tablet Take  by mouth daily.  ERGOCALCIFEROL, VITAMIN D2, (VITAMIN D2 PO) Take 4,000 Units by mouth daily. Allergies   Allergen Reactions    Codeine Nausea and Vomiting     Family History   Problem Relation Age of Onset    Heart Disease Mother     Heart Disease Father      chf in 80s     Social History   Substance Use Topics    Smoking status: Passive Smoke Exposure - Never Smoker     Packs/day: 0.50     Years: 1.00     Types: Cigarettes    Smokeless tobacco: Never Used    Alcohol use 2.0 oz/week     4 Glasses of wine per week     Patient Active Problem List   Diagnosis Code    HTN (hypertension) I5    Hypothyroid E03.9    Hypercholesterolemia E78.00    Diverticulitis K57.92    Hiatal hernia K44.9    PVD (peripheral vascular disease) (HCC) I73.9       Depression Risk Factor Screening:     PHQ over the last two weeks 12/8/2017   Little interest or pleasure in doing things Not at all   Feeling down, depressed or hopeless Not at all   Total Score PHQ 2 0     Alcohol Risk Factor Screening: You do not drink alcohol or very rarely. Functional Ability and Level of Safety:     Hearing Loss  Hearing is good. Activities of Daily Living  The home contains: no safety equipment. Patient does total self care    Fall Risk  Fall Risk Assessment, last 12 mths 12/8/2017   Able to walk? Yes   Fall in past 12 months?  No       Abuse Screen  Patient is not abused    Cognitive Screening   Evaluation of Cognitive Function:  Has your family/caregiver stated any concerns about your memory: no  Normal    Patient Care Team   Patient Care Team:  Kaity Ramirez MD as PCP - General    Assessment/Plan   Education and counseling provided:  Are appropriate based on today's review and evaluation  Pneumococcal Vaccine  Influenza Vaccine  Screening Mammography  Bone mass measurement (DEXA)    Diagnoses and all orders for this visit:    1. Medicare annual wellness visit, initial    2. Breast cancer screening  -     [facility] 3D MILA W MAMMO BI SCREENING INCL CAD; Future    3. Colon cancer screening  -     OCCULT BLOOD, IMMUNOASSAY (FIT)    4. Essential hypertension    5. Hypercholesterolemia  -     atorvastatin (LIPITOR) 20 mg tablet; TAKE 1 TABLET DAILY    6. Hypothyroidism due to acquired atrophy of thyroid  -     levothyroxine (SYNTHROID) 100 mcg tablet; TAKE 1 TABLET DAILY BEFORE BREAKFAST    7. IFG (impaired fasting glucose)    8. Essential hypertension with goal blood pressure less than 130/80  -     hydroCHLOROthiazide (HYDRODIURIL) 12.5 mg tablet; TAKE 1 TABLET BY MOUTH EVERY DAY  -     atenolol (TENORMIN) 25 mg tablet; TAKE 1 TABLET BY MOUTH EVERY DAY TAKE WITH THE 50 MG DOSE  -     telmisartan (MICARDIS) 40 mg tablet; TAKE 1 TABLET DAILY  -     AMB POC EKG ROUTINE W/ 12 LEADS, INTER & REP    9. GERD without esophagitis  -     raNITIdine (ZANTAC) 150 mg tablet; TAKE 1 TABLET TWICE A DAY    10. Chronic right SI joint pain-order for PT    11. PVD (peripheral vascular disease) ([facility] [address]  -     DUPLEX CAROTID BILATERAL;  Future         Health Maintenance Due   Topic Date Due    Hepatitis C Screening  1950    COLONOSCOPY  09/25/1969    DTaP/Tdap/Td series (1 - Tdap) 09/25/1972    ZOSTER VACCINE AGE 60>  07/25/2011    GLAUCOMA SCREENING Q2Y  09/25/2016    Pneumococcal 65+ Low/Medium Risk (1 of 2 - PCV13) 09/25/2016    MEDICARE YEARLY EXAM  09/25/2016    Influenza Age 9 to Adult  08/01/2017

## 2017-12-30 DIAGNOSIS — R00.1 BRADYCARDIA: ICD-10-CM

## 2017-12-30 DIAGNOSIS — E78.00 HYPERCHOLESTEROLEMIA: ICD-10-CM

## 2017-12-30 RX ORDER — ATENOLOL 50 MG/1
TABLET ORAL
Qty: 90 TAB | Refills: 2 | Status: SHIPPED | OUTPATIENT
Start: 2017-12-30 | End: 2018-01-03 | Stop reason: RX

## 2017-12-30 RX ORDER — ATORVASTATIN CALCIUM 20 MG/1
TABLET, FILM COATED ORAL
Qty: 90 TAB | Refills: 2 | Status: SHIPPED | OUTPATIENT
Start: 2017-12-30 | End: 2018-04-03 | Stop reason: SDUPTHER

## 2018-01-03 ENCOUNTER — TELEPHONE (OUTPATIENT)
Dept: INTERNAL MEDICINE CLINIC | Age: 68
End: 2018-01-03

## 2018-01-03 DIAGNOSIS — I10 ESSENTIAL HYPERTENSION WITH GOAL BLOOD PRESSURE LESS THAN 130/80: ICD-10-CM

## 2018-01-03 RX ORDER — ATENOLOL 25 MG/1
TABLET ORAL
Qty: 270 TAB | Refills: 0 | Status: SHIPPED | OUTPATIENT
Start: 2018-01-03 | End: 2018-03-08 | Stop reason: ALTCHOICE

## 2018-01-03 NOTE — TELEPHONE ENCOUNTER
----- Message from Parris Toledo Page sent at 1/3/2018  3:11 PM EST -----  Regarding: Dr. Eliazar Maradiaga  Pt is requesting a return call regarding current b/p medication \"Altenolol\" being discontinue. Best contact number is [phone].

## 2018-01-03 NOTE — TELEPHONE ENCOUNTER
Patient was notified by [facility] Atenolol is being d/c by the  and she was using the 50 mg & 25 mg doses to get a total of 75 mg. She was told they have the 25 mg in stock and advised her to contact the office to get a new order for three 25 mg tabs daily to equal her current dose. Advised patient this will get done today. Patient voiced understanding.

## 2018-01-22 ENCOUNTER — TELEPHONE (OUTPATIENT)
Dept: INTERNAL MEDICINE CLINIC | Age: 68
End: 2018-01-22

## 2018-01-22 DIAGNOSIS — K44.9 HIATAL HERNIA: Primary | ICD-10-CM

## 2018-01-22 RX ORDER — OMEPRAZOLE 20 MG/1
20 CAPSULE, DELAYED RELEASE ORAL DAILY
Qty: 90 CAP | Refills: 2 | Status: SHIPPED | OUTPATIENT
Start: 2018-01-22 | End: 2018-03-08 | Stop reason: ALTCHOICE

## 2018-01-22 NOTE — TELEPHONE ENCOUNTER
Per patient Dr Multani Presume to her she could take Prilosec and would like for Dr Multani Presume to call it in for her [phone]  Her no [phone]

## 2018-01-22 NOTE — TELEPHONE ENCOUNTER
Patient verified by name and . Patient notifed. FYI: Patient states that she saw an orthopedist, and they think her hip/back pain is due to muscle spasm. She is taking Flexeril at night but she has difficulty taking it and waking in the morning. She is hoping that the pain will dissipate soon.

## 2018-01-27 ENCOUNTER — PATIENT MESSAGE (OUTPATIENT)
Dept: INTERNAL MEDICINE CLINIC | Age: 68
End: 2018-01-27

## 2018-01-31 NOTE — TELEPHONE ENCOUNTER
From: Neville Krishnan  To: Cyril Burns MD  Sent: 1/27/2018 10:38 AM EST  Subject: Non-Urgent Medical Question    I forgot to mention I have been taking 800 mg of motrin 2x day and the 5 mg of flexril only at bedtime to help with the backpain which hasn't really changed just not worse.   Earnestine Valiente

## 2018-02-13 ENCOUNTER — HOSPITAL ENCOUNTER (OUTPATIENT)
Dept: MAMMOGRAPHY | Age: 68
Discharge: HOME | End: 2018-02-13
Attending: INTERNAL MEDICINE
Payer: MEDICARE

## 2018-02-13 ENCOUNTER — HOSPITAL ENCOUNTER (OUTPATIENT)
Dept: VASCULAR SURGERY | Age: 68
Discharge: HOME | End: 2018-02-13
Attending: INTERNAL MEDICINE
Payer: MEDICARE

## 2018-02-13 DIAGNOSIS — I73.9 PVD (PERIPHERAL VASCULAR DISEASE) (HCC): ICD-10-CM

## 2018-02-13 DIAGNOSIS — Z12.39 BREAST CANCER SCREENING: ICD-10-CM

## 2018-02-13 PROCEDURE — 93880 EXTRACRANIAL BILAT STUDY: CPT

## 2018-02-13 PROCEDURE — 77063 BREAST TOMOSYNTHESIS BI: CPT

## 2018-02-13 NOTE — PROCEDURES
[facility]  *** FINAL REPORT ***    Name: Josie Baeza  MRN: [de-identified]    Outpatient  : 25 Sep 1950  HIS Order #: [id number]  [address] [facility] Visit #: [id number]  Date: 2018    TYPE OF TEST: Cerebrovascular Duplex    REASON FOR TEST  Carotid bruit (left hemisphere)    Right Carotid:-             Proximal               Mid                 Distal  cm/s  Systolic  Diastolic  Systolic  Diastolic  Systolic  Diastolic  CCA:     95.9      18.0                            70.0      15.0  Bulb:  ECA:    111.0      14.0  ICA:     59.0      16.0                            91.0      35.0  ICA/CCA:  0.8       1.1    ICA Stenosis:    Right Vertebral:-  Finding: Antegrade  Sys:       54.0  Elizabeth:       10.0    Right Subclavian:    Left Carotid:-            Proximal                Mid                 Distal  cm/s  Systolic  Diastolic  Systolic  Diastolic  Systolic  Diastolic  CCA:    227.5      25.0                            71.0      15.0  Bulb:  ECA:     94.0      11.0  ICA:    121.0      30.0                            96.0      27.0  ICA/CCA:  1.7       2.0    ICA Stenosis:    Left Vertebral:-  Finding: Antegrade  Sys:       57.0  Elizabeth:       13.0    Left Subclavian:    INTERPRETATION/FINDINGS  PROCEDURE:  Color duplex ultrasound imaging of extracranial  cerebrovascular arteries. FINDINGS:       Right: Internal carotid velocity is within normal limits. There  is narrowing of the internal carotid flow channel on color Doppler  imaging and non-calcific plaque on B-mode imaging, consistent with  less than 50 percent stenosis. The common and external carotid  arteries are patent and without evidence of hemodynamically  significant stenosis. Left:  Internal carotid velocity is within normal limits. There  is narrowing of the internal carotid flow channel on color Doppler  imaging and calcific plaque on B-mode imaging, consistent with less  than 50 percent stenosis.   The common and external carotid arteries  are patent and without evidence of hemodynamically significant  stenosis. IMPRESSION:  Consistent with less than 50% stenosis of the right  internal carotid and less than 50% stenosis of the left internal  carotid. Vertebrals are patent with antegrade flow. ADDITIONAL COMMENTS    I have personally reviewed the data relevant to the interpretation of  this  study. TECHNOLOGIST: Evelyn Chang.  Markus Obrien  Signed: 02/13/2018 04:15 PM    PHYSICIAN: Roberto Briceno MD  Signed: 02/14/2018 06:19 AM

## 2018-02-14 NOTE — PROGRESS NOTES
Notify mild plaque seen on carotids-nothing obstructive  I would cont the asa 81 mg and lipitor to avoid buildup  Of more plaque

## 2018-02-16 ENCOUNTER — PATIENT MESSAGE (OUTPATIENT)
Dept: INTERNAL MEDICINE CLINIC | Age: 68
End: 2018-02-16

## 2018-03-08 ENCOUNTER — OFFICE VISIT (OUTPATIENT)
Dept: INTERNAL MEDICINE CLINIC | Age: 68
End: 2018-03-08

## 2018-03-08 VITALS
RESPIRATION RATE: 16 BRPM | HEIGHT: 67 IN | BODY MASS INDEX: 23.89 KG/M2 | TEMPERATURE: 99.1 F | WEIGHT: 152.2 LBS | OXYGEN SATURATION: 98 % | HEART RATE: 61 BPM | SYSTOLIC BLOOD PRESSURE: 143 MMHG | DIASTOLIC BLOOD PRESSURE: 68 MMHG

## 2018-03-08 DIAGNOSIS — E03.4 HYPOTHYROIDISM DUE TO ACQUIRED ATROPHY OF THYROID: ICD-10-CM

## 2018-03-08 DIAGNOSIS — M54.9 BACK PAIN, UNSPECIFIED BACK LOCATION, UNSPECIFIED BACK PAIN LATERALITY, UNSPECIFIED CHRONICITY: Primary | ICD-10-CM

## 2018-03-08 DIAGNOSIS — E83.42 HYPOMAGNESEMIA: ICD-10-CM

## 2018-03-08 DIAGNOSIS — K44.9 HIATAL HERNIA: ICD-10-CM

## 2018-03-08 DIAGNOSIS — I10 ESSENTIAL HYPERTENSION: ICD-10-CM

## 2018-03-08 LAB
BILIRUB UR QL STRIP: NEGATIVE
GLUCOSE UR-MCNC: NEGATIVE MG/DL
KETONES P FAST UR STRIP-MCNC: NEGATIVE MG/DL
PH UR STRIP: 7 [PH] (ref 4.6–8)
PROT UR QL STRIP: NEGATIVE
SP GR UR STRIP: 1.01 (ref 1–1.03)
UA UROBILINOGEN AMB POC: NORMAL (ref 0.2–1)
URINALYSIS CLARITY POC: CLEAR
URINALYSIS COLOR POC: YELLOW
URINE BLOOD POC: NORMAL
URINE LEUKOCYTES POC: NEGATIVE
URINE NITRITES POC: NEGATIVE

## 2018-03-08 RX ORDER — LANOLIN ALCOHOL/MO/W.PET/CERES
400 CREAM (GRAM) TOPICAL DAILY
Qty: 30 TAB | Refills: 5 | Status: SHIPPED | OUTPATIENT
Start: 2018-03-08 | End: 2018-03-12 | Stop reason: SDUPTHER

## 2018-03-08 RX ORDER — ATENOLOL 100 MG/1
100 TABLET ORAL DAILY
Qty: 30 TAB | Refills: 0
Start: 2018-03-08 | End: 2018-05-14 | Stop reason: SDUPTHER

## 2018-03-08 NOTE — MR AVS SNAPSHOT
[facility] 
 
 
 [address] [place] [address] 
[phone] Patient: Odilon Moses MRN:  [id number] Visit Information Date & Time Provider Department Dept. Phone Encounter #  
 3/8/2018  3:00 PM Peter Khoury MD [facility] [address] [id number] Upcoming Health Maintenance Date Due Hepatitis C Screening 1950 COLONOSCOPY 9/25/1969 DTaP/Tdap/Td series (1 - Tdap) 9/25/1972 ZOSTER VACCINE AGE 60> 7/25/2011 GLAUCOMA SCREENING Q2Y 9/25/2016 Pneumococcal 65+ Low/Medium Risk (1 of 2 - PCV13) 9/25/2016 Influenza Age 5 to Adult 8/1/2017 MEDICARE YEARLY EXAM 12/9/2018 BREAST CANCER SCRN MAMMOGRAM 2/13/2020 Allergies as of 3/8/2018  Review Complete On: 3/8/2018 By: Peter Khoury MD  
  
 Severity Noted Reaction Type Reactions Codeine  07/28/2009    Nausea and Vomiting Current Immunizations  Reviewed on 12/8/2017 No immunizations on file. Not reviewed this visit You Were Diagnosed With   
  
 Codes Comments Back pain, unspecified back location, unspecified back pain laterality, unspecified chronicity    -  Primary ICD-10-CM: M54.9 ICD-9-CM: 724.5 Essential hypertension     ICD-10-CM: I10 
ICD-9-CM: 401.9 Hypomagnesemia     ICD-10-CM: H60.04 
ICD-9-CM: 275.2 Hiatal hernia     ICD-10-CM: K44.9 ICD-9-CM: 553.3 Hypothyroidism due to acquired atrophy of thyroid     ICD-10-CM: E03.4 ICD-9-CM: 244.8, 246.8 Vitals BP Pulse Temp Resp Height(growth percentile) Weight(growth percentile) 143/68 (BP 1 Location: Left arm, BP Patient Position: Sitting) 61 99.1 °F (37.3 °C) (Oral) 16 5' 7\" (1.702 m) 152 lb 3.2 oz (69 kg) SpO2 BMI OB Status Smoking Status 98% 23.84 kg/m2 Postmenopausal Passive Smoke Exposure - Never Smoker Vitals History BMI and BSA Data Body Mass Index Body Surface Area 23.84 kg/m 2 1.81 m 2 Preferred Pharmacy Pharmacy Name Phone [id number] [address], [address] [facility] [phone] Your Updated Medication List  
  
   
This list is accurate as of 3/8/18  3:47 PM.  Always use your most recent med list.  
  
  
  
  
 aspirin delayed-release 81 mg tablet Take  by mouth daily. atenolol 100 mg tablet Commonly known as:  TENORMIN Take 1 Tab by mouth daily. atorvastatin 20 mg tablet Commonly known as:  LIPITOR  
TAKE 1 TABLET BY MOUTH EVERY DAY  
  
 CLARITIN 10 mg tablet Generic drug:  loratadine Take 10 mg by mouth as needed. DYMISTA 137-50 mcg/spray [place] Generic drug:  azelastine-fluticasone 2 Sprays by Nasal route as needed. hydroCHLOROthiazide 12.5 mg tablet Commonly known as:  HYDRODIURIL  
TAKE 1 TABLET BY MOUTH EVERY DAY  
  
 ibuprofen 800 mg tablet Commonly known as:  MOTRIN Take 1 Tab by mouth every eight (8) hours as needed for Pain.  
  
 levothyroxine 100 mcg tablet Commonly known as:  SYNTHROID  
TAKE 1 TABLET DAILY BEFORE BREAKFAST  
  
 magnesium oxide 400 mg tablet Commonly known as:  MAG-OX Take 1 Tab by mouth daily. montelukast 10 mg tablet Commonly known as:  SINGULAIR  
  
 NEXIUM 24HR PO Take 20 mg by mouth daily. telmisartan 40 mg tablet Commonly known as:  MICARDIS  
TAKE 1 TABLET DAILY  
  
 VITAMIN D2 PO Take 4,000 Units by mouth daily. Prescriptions Sent to Pharmacy Refills  
 magnesium oxide (MAG-OX) 400 mg tablet 5 Sig: Take 1 Tab by mouth daily. Class: Normal  
 Pharmacy: [facility]/pharmacy [address], [address] [place] [address] Ph #: [phone] Route: Oral  
  
We Performed the Following AMB POC URINALYSIS DIP STICK AUTO W/O MICRO [15634 CPT(R)] Introducing hospitals & HEALTH SERVICES! Dear Amelie Alcantara: Thank you for requesting a [employer] account. Our records indicate that you already have an active [employer] account. You can access your account anytime at [email or web address] Did you know that you can access your hospital and ER discharge instructions at any time in [employer]? You can also review all of your test results from your hospital stay or ER visit. Additional Information If you have questions, please visit the Frequently Asked Questions section of the [employer] website at [email or web address] Remember, [employer] is NOT to be used for urgent needs. For medical emergencies, dial 911. Now available from your iPhone and Android! Please provide this summary of care documentation to your next provider. Your primary care clinician is listed as Nomi 68. If you have any questions after today's visit, please call [phone].

## 2018-03-08 NOTE — PROGRESS NOTES
HISTORY OF PRESENT ILLNESS  Teri Luu is a 78 y.o. female. HPI  Eligio Ramos is seen with several issues. Low back pain, non radiating. Urine dips negative for leukocytes. 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 Physical therapy has been helpful. She has seen orthopedics and they do not think it is coming from her hips. Palpitations despite 75 of Atenolol, so bumped back up to 100, which has helped. She stalin labs through work and her magnesium level was slightly low at 1.4, so I am putting her on a magnesium supplement. Her lipids and chemistries look good. She does not have a thyroid level on current labs and I am giving her an order for this. Remains on Synthroid. Review of Systems   Constitutional: Positive for malaise/fatigue. Negative for chills, diaphoresis, fever and weight loss. Respiratory: Negative for cough, shortness of breath and wheezing. Cardiovascular: Positive for palpitations. Negative for chest pain, orthopnea, leg swelling and PND. Gastrointestinal: Negative for abdominal pain, heartburn and nausea. Musculoskeletal: Negative for myalgias. Skin: Negative for rash. Neurological: Negative for dizziness and headaches. Psychiatric/Behavioral: Positive for depression. Physical Exam   Constitutional: She is oriented to person, place, and time. She appears well-developed and well-nourished. HENT:   Head: Normocephalic and atraumatic. Neck: Normal range of motion. Neck supple. Carotid bruit is not present. No thyromegaly present. Cardiovascular: Normal rate, regular rhythm, S1 normal, S2 normal, normal heart sounds and intact distal pulses. No murmur heard. Pulmonary/Chest: Effort normal and breath sounds normal. No respiratory distress. She has no wheezes. She has no rales. Musculoskeletal: She exhibits no edema.    Neurological: She is alert and oriented to person, place, and time.   Psychiatric: She has a normal mood and affect. Her behavior is normal.   Nursing note and vitals reviewed. ASSESSMENT and PLAN  Diagnoses and all orders for this visit:    1. Back pain, unspecified back location, unspecified back pain laterality, unspecified chronicity-cont exercises from pt and agree with working on dec sitting at desk at work as she plans ( hopes to retire  -     AMB POC URINALYSIS DIP STICK AUTO W/O MICRO    2. Essential hypertension  -     atenolol (TENORMIN) 100 mg tablet; Take 1 Tab by mouth daily. 3. Hypomagnesemia  -     magnesium oxide (MAG-OX) 400 mg tablet; Take 1 Tab by mouth daily. 4. Hiatal hernia    5.  Hypothyroidism due to acquired atrophy of thyroid  -     TSH 3RD GENERATION  -     T4, FREE    appt in 4 mo

## 2018-03-12 DIAGNOSIS — E83.42 HYPOMAGNESEMIA: ICD-10-CM

## 2018-03-12 RX ORDER — LANOLIN ALCOHOL/MO/W.PET/CERES
400 CREAM (GRAM) TOPICAL DAILY
Qty: 90 TAB | Refills: 1 | Status: SHIPPED | OUTPATIENT
Start: 2018-03-12 | End: 2018-09-25 | Stop reason: SDUPTHER

## 2018-03-16 DIAGNOSIS — I73.9 PVD (PERIPHERAL VASCULAR DISEASE) (HCC): Primary | ICD-10-CM

## 2018-03-29 ENCOUNTER — HOSPITAL ENCOUNTER (OUTPATIENT)
Dept: CT IMAGING | Age: 68
Discharge: HOME | End: 2018-03-29
Attending: INTERNAL MEDICINE
Payer: SELF-PAY

## 2018-03-29 DIAGNOSIS — I73.9 PVD (PERIPHERAL VASCULAR DISEASE) (HCC): ICD-10-CM

## 2018-03-29 PROCEDURE — 75571 CT HRT W/O DYE W/CA TEST: CPT

## 2018-04-01 ENCOUNTER — TELEPHONE (OUTPATIENT)
Dept: INTERNAL MEDICINE CLINIC | Age: 68
End: 2018-04-01

## 2018-04-01 NOTE — TELEPHONE ENCOUNTER
Discussed her abn coronary ct score-needs appt with cardiology asap-she wants to see dr Lerma Led and will make an appt asap and let us know if she has any trouble getting visit-also will cont the asa every day and lipitor and will go to ed if any sxs of cp or paul develop

## 2018-04-03 ENCOUNTER — OFFICE VISIT (OUTPATIENT)
Dept: CARDIOLOGY CLINIC | Age: 68
End: 2018-04-03

## 2018-04-03 VITALS
DIASTOLIC BLOOD PRESSURE: 84 MMHG | RESPIRATION RATE: 16 BRPM | HEART RATE: 66 BPM | WEIGHT: 150.6 LBS | HEIGHT: 67 IN | SYSTOLIC BLOOD PRESSURE: 140 MMHG | BODY MASS INDEX: 23.64 KG/M2

## 2018-04-03 DIAGNOSIS — I10 ESSENTIAL HYPERTENSION: ICD-10-CM

## 2018-04-03 DIAGNOSIS — E78.00 HYPERCHOLESTEROLEMIA: ICD-10-CM

## 2018-04-03 DIAGNOSIS — I73.9 PVD (PERIPHERAL VASCULAR DISEASE) (HCC): ICD-10-CM

## 2018-04-03 DIAGNOSIS — I25.10 CORONARY ARTERY DISEASE INVOLVING NATIVE CORONARY ARTERY OF NATIVE HEART WITHOUT ANGINA PECTORIS: Primary | ICD-10-CM

## 2018-04-03 RX ORDER — ATORVASTATIN CALCIUM 80 MG/1
80 TABLET, FILM COATED ORAL DAILY
Qty: 30 TAB | Refills: 11 | Status: SHIPPED | OUTPATIENT
Start: 2018-04-03 | End: 2018-04-23 | Stop reason: SDUPTHER

## 2018-04-03 NOTE — MR AVS SNAPSHOT
[address] [facility] [address] [facility] 
[phone] Patient: Renato Tan MRN:  [id number] Visit Information Date & Time Provider Department Dept. Phone Encounter #  
 4/3/2018 11:00 AM Angelia Santana [phone] [id number] Your Appointments 7/26/2018  2:15 PM  
ROUTINE CARE with Eleuterio Atkins MD  
[facility] [address]) Appt Note: f/u  
 [address] [address]  
[phone]  
  
   
 [address] [facility] [address] Upcoming Health Maintenance Date Due Hepatitis C Screening 1950 COLONOSCOPY 9/25/1969 DTaP/Tdap/Td series (1 - Tdap) 9/25/1972 ZOSTER VACCINE AGE 60> 7/25/2011 GLAUCOMA SCREENING Q2Y 9/25/2016 Bone Densitometry (Dexa) Screening 9/25/2016 Pneumococcal 65+ Low/Medium Risk (1 of 2 - PCV13) 9/25/2016 Influenza Age 5 to Adult 8/1/2017 MEDICARE YEARLY EXAM 12/9/2018 BREAST CANCER SCRN MAMMOGRAM 2/13/2020 Allergies as of 4/3/2018  Review Complete On: 4/3/2018 By: Martita Olivarez Severity Noted Reaction Type Reactions Codeine  07/28/2009    Nausea and Vomiting Current Immunizations  Reviewed on 12/8/2017 No immunizations on file. Not reviewed this visit Vitals BP Pulse Resp Height(growth percentile) Weight(growth percentile) BMI  
 140/84 (BP 1 Location: Left arm, BP Patient Position: Sitting) 66 16 5' 7\" (1.702 m) 150 lb 9.6 oz (68.3 kg) 23.59 kg/m2 OB Status Smoking Status Postmenopausal Former Smoker Vitals History BMI and BSA Data Body Mass Index Body Surface Area  
 23.59 kg/m 2 1.8 m 2 Preferred Pharmacy Pharmacy Name Phone [id number] Cornelia Black, [address] [facility] [phone] Your Updated Medication List  
 This list is accurate as of 4/3/18 11:04 AM.  Always use your most recent med list.  
  
  
  
  
 aspirin delayed-release 81 mg tablet Take  by mouth daily. atenolol 100 mg tablet Commonly known as:  TENORMIN Take 1 Tab by mouth daily. atorvastatin 20 mg tablet Commonly known as:  LIPITOR  
TAKE 1 TABLET BY MOUTH EVERY DAY  
  
 CLARITIN 10 mg tablet Generic drug:  loratadine Take 10 mg by mouth daily as needed. DYMISTA 137-50 mcg/spray [place] Generic drug:  azelastine-fluticasone 2 Sprays by Nasal route as needed. FISH -160-1,000 mg Cap Generic drug:  omega 3-dha-epa-fish oil Take 2 Caps by mouth daily. hydroCHLOROthiazide 12.5 mg tablet Commonly known as:  HYDRODIURIL  
TAKE 1 TABLET BY MOUTH EVERY DAY  
  
 ibuprofen 800 mg tablet Commonly known as:  MOTRIN Take 1 Tab by mouth every eight (8) hours as needed for Pain.  
  
 levothyroxine 100 mcg tablet Commonly known as:  SYNTHROID  
TAKE 1 TABLET DAILY BEFORE BREAKFAST  
  
 magnesium oxide 400 mg tablet Commonly known as:  MAG-OX Take 1 Tab by mouth daily. montelukast 10 mg tablet Commonly known as:  SINGULAIR Take 10 mg by mouth as needed. NEXIUM 24HR PO Take 20 mg by mouth daily. telmisartan 40 mg tablet Commonly known as:  MICARDIS  
TAKE 1 TABLET DAILY  
  
 VITAMIN D2 PO Take 4,000 Units by mouth daily. Introducing [facility] & [facility] SERVICES! Dear Cl Sofia: Thank you for requesting a Luxoft account. Our records indicate that you already have an active Luxoft account. You can access your account anytime at [email or web address] Did you know that you can access your hospital and ER discharge instructions at any time in Luxoft? You can also review all of your test results from your hospital stay or ER visit. Additional Information If you have questions, please visit the Frequently Asked Questions section of the ecoVent website at [email or web address] Remember, ecoVent is NOT to be used for urgent needs. For medical emergencies, dial 911. Now available from your iPhone and Android! Please provide this summary of care documentation to your next provider. Your primary care clinician is listed as Nomi 68. If you have any questions after today's visit, please call [phone].

## 2018-04-03 NOTE — PROGRESS NOTES
HISTORY OF PRESENT ILLNESS  Leland Raymond is a 78 y.o. female. She is referred for evaluation of an abnormal calcium score. She had a negative stress echocardiogram done 5 months ago. She has no chest pain. A routine calcium score was quite abnormal with a score of 2039. The calcium was most prevalent in the left anterior descending and right coronary arteries. She used to smoke up until October of last year. She also has known mild left carotid bruit with some plaque. In January her total cholesterol was 190 with an LDL cholesterol of 99 and HDL of 55 on 20 mg of Lipitor. Hemoglobin A1c has been slightly increased at 6.2. She had a remote history of mitral valve prolapse, but more recent echocardiograms, at least one done 5 years ago did not show this. She has trouble walking stairs because of her left knee, but can walk fine on level ground or a slight incline. HPI  Patient Active Problem List   Diagnosis Code    HTN (hypertension) I5    Hypothyroid E03.9    Hypercholesterolemia E78.00    Diverticulitis K57.92    Hiatal hernia K44.9    PVD [facility]peripheral vascular disease) ([facility]) I73.9     Current Outpatient Prescriptions   Medication Sig Dispense Refill    omega 3-dha-epa-fish oil (FISH OIL) 100-160-1,000 mg cap Take 2 Caps by mouth daily.  atorvastatin (LIPITOR) 80 mg tablet Take 1 Tab by mouth daily. 30 Tab 11    magnesium oxide (MAG-OX) 400 mg tablet Take 1 Tab by mouth daily. 90 Tab 1    ESOMEPRAZOLE MAGNESIUM (NEXIUM 24HR PO) Take 20 mg by mouth daily.  atenolol (TENORMIN) 100 mg tablet Take 1 Tab by mouth daily. 30 Tab 0    hydroCHLOROthiazide (HYDRODIURIL) 12.5 mg tablet TAKE 1 TABLET BY MOUTH EVERY DAY 90 Tab 2    levothyroxine (SYNTHROID) 100 mcg tablet TAKE 1 TABLET DAILY BEFORE BREAKFAST 90 Tab 1    telmisartan (MICARDIS) 40 mg tablet TAKE 1 TABLET DAILY 90 Tab 2    ibuprofen (MOTRIN) 800 mg tablet Take 1 Tab by mouth every eight (8) hours as needed for Pain.  40 Tab 0    loratadine (CLARITIN) 10 mg tablet Take 10 mg by mouth daily as needed.  montelukast (SINGULAIR) 10 mg tablet Take 10 mg by mouth as needed. 6    aspirin delayed-release 81 mg tablet Take  by mouth daily.  ERGOCALCIFEROL, VITAMIN D2, (VITAMIN D2 PO) Take 4,000 Units by mouth daily.  DYMISTA 137-50 mcg/spray spry 2 Sprays by Nasal route as needed. 6     Past Medical History:   Diagnosis Date    Diverticulitis 7/28/2009    HTN 7/28/2009    Hypercholesterolemia 7/28/2009    Hypothyroid 7/28/2009     Past Surgical History:   Procedure Laterality Date    HX ORTHOPAEDIC      anterior cervical discectomy and fusion       Review of Systems   Musculoskeletal: Positive for joint pain. All other systems reviewed and are negative. Visit Vitals    /84 (BP 1 Location: Left arm, BP Patient Position: Sitting)    Pulse 66    Resp 16    Ht 5' 7\" (1.702 m)    Wt 150 lb 9.6 oz (68.3 kg)    BMI 23.59 kg/m2       Physical Exam   Constitutional: She is oriented to person, place, and time. She appears well-nourished. HENT:   Head: Atraumatic. Eyes: Conjunctivae are normal.   Neck: Neck supple. Cardiovascular: Normal rate, regular rhythm and normal heart sounds. Exam reveals no gallop and no friction rub. No murmur heard. Pulmonary/Chest: She has no wheezes. She has no rales. Abdominal: Bowel sounds are normal.   Musculoskeletal: She exhibits no edema. Neurological: She is oriented to person, place, and time. No cranial nerve deficit. Skin: Skin is dry. Nursing note and vitals reviewed. ASSESSMENT and PLAN  She has a very elevated calcium score, but no symptoms to suggest angina. She does need repeat stress testing, however. I will have her return next week for a stress echocardiogram and we will see her after to review the results. In the interim, I have suggested she increase her Lip;itor from 20 to 80 mg a day and I have given her a new prescription in this regard.  She needs lipids checked again in about 2 months. 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 She will return with the results in about 2 months for follow up. If the cholesterol is not optimally treated, then she should go on Crestor. If the stress test is abnormal, then of course, she needs to undergo cardiac catheterization.

## 2018-04-04 ENCOUNTER — TELEPHONE (OUTPATIENT)
Dept: CARDIOLOGY CLINIC | Age: 68
End: 2018-04-04

## 2018-04-04 DIAGNOSIS — E78.00 HYPERCHOLESTEROLEMIA: ICD-10-CM

## 2018-04-04 NOTE — TELEPHONE ENCOUNTER
Scheduled her for her stress echo next week on 4/11. I did not schedule the 2 month follow up because pt would like to see Dr Danette Andrade because her  is his pt and she knows him. Thanks!

## 2018-04-11 ENCOUNTER — CLINICAL SUPPORT (OUTPATIENT)
Dept: CARDIOLOGY CLINIC | Age: 68
End: 2018-04-11

## 2018-04-11 DIAGNOSIS — R06.02 SHORTNESS OF BREATH: Primary | ICD-10-CM

## 2018-04-11 DIAGNOSIS — R93.1 ELEVATED CORONARY ARTERY CALCIUM SCORE: ICD-10-CM

## 2018-04-23 DIAGNOSIS — E78.00 HYPERCHOLESTEROLEMIA: ICD-10-CM

## 2018-04-23 RX ORDER — ATORVASTATIN CALCIUM 80 MG/1
80 TABLET, FILM COATED ORAL DAILY
Qty: 90 TAB | Refills: 2 | Status: SHIPPED | OUTPATIENT
Start: 2018-04-23 | End: 2018-04-23 | Stop reason: SDUPTHER

## 2018-04-23 RX ORDER — ATORVASTATIN CALCIUM 80 MG/1
80 TABLET, FILM COATED ORAL DAILY
Qty: 90 TAB | Refills: 2 | Status: SHIPPED | OUTPATIENT
Start: 2018-04-23 | End: 2019-04-09 | Stop reason: SDUPTHER

## 2018-04-23 NOTE — TELEPHONE ENCOUNTER
Requested Prescriptions     Signed Prescriptions Disp Refills    atorvastatin (LIPITOR) 80 mg tablet 90 Tab 2     Sig: Take 1 Tab by mouth daily.      Authorizing Provider: Arlen Parsons     Ordering User: Rubia Gilmore    Per Dr. Fely Eid verbal order

## 2018-04-26 ENCOUNTER — OFFICE VISIT (OUTPATIENT)
Dept: CARDIOLOGY CLINIC | Age: 68
End: 2018-04-26

## 2018-04-26 VITALS
HEIGHT: 67 IN | SYSTOLIC BLOOD PRESSURE: 136 MMHG | RESPIRATION RATE: 16 BRPM | DIASTOLIC BLOOD PRESSURE: 76 MMHG | BODY MASS INDEX: 23.7 KG/M2 | HEART RATE: 60 BPM | WEIGHT: 151 LBS | OXYGEN SATURATION: 94 %

## 2018-04-26 DIAGNOSIS — E78.00 HYPERCHOLESTEROLEMIA: ICD-10-CM

## 2018-04-26 DIAGNOSIS — I73.9 PVD (PERIPHERAL VASCULAR DISEASE) (HCC): ICD-10-CM

## 2018-04-26 DIAGNOSIS — R93.1 AGATSTON CAC SCORE, >400: Primary | ICD-10-CM

## 2018-04-26 DIAGNOSIS — I10 ESSENTIAL HYPERTENSION: ICD-10-CM

## 2018-04-26 DIAGNOSIS — R94.39 EQUIVOCAL STRESS TEST: ICD-10-CM

## 2018-04-26 RX ORDER — HYDROGEN PEROXIDE 3 %
20 SOLUTION, NON-ORAL MISCELLANEOUS DAILY
COMMUNITY

## 2018-04-26 NOTE — PROGRESS NOTES
Vernon James     1950       Lior Whitley MD, [facility]  Date of Visit-4/26/2018   PCP is Ria Nunez MD   [facility]  [facility]  HPI:  Vernon James is a 78 y.o. female   Pt seen April 3rd by Dr. Sylvester Brooks for an abnormal calcium score at 2039. She is a smoker. It was suggested that she increase her Lipitor to 80 and get a stress test. Pt had a stress echo which showed normal echo findings though some EKG changes. Overall the pt states she is doing well. She reports that she was slightly short of breath towards the end of her stress test but otherwise felt fine. The pt reports that she hasn't smoke since October. She states that she is taking aspirin. The pt states that she line dances but is otherwise not very active. Denies chest pain, edema, syncope, has no tachycardia, palpitations or sense of arrhythmia. Assessment/Plan:The primary encounter diagnosis was Agatston CAC score, >400. Diagnoses of Equivocal stress test, Hypercholesterolemia, Essential hypertension, and PVD (peripheral vascular disease) ([address] were also pertinent to this visit. 1. echo images equivocal and will get a CT angiogram. She will continue diet and exercise as well as high dose statin. Repeat lipids in September with me.   cardiovascular disease risk factors include HTN, dyslipidemia and PVD  BP Readings from Last 3 Encounters:   04/26/18 136/76   04/03/18 140/84   03/08/18 143/68     Lab Results   Component Value Date/Time    LDL Cholesterol 95 10/06/2014    LDL, calculated 76 03/27/2012 11:19 AM      2. Follow up in September. Impression:   1. Agatston CAC score, >400    2. Equivocal stress test    3. Hypercholesterolemia    4. Essential hypertension    5. PVD (peripheral vascular disease) (HCC)      ROS-except as noted above. . A complete cardiac and respiratory are reviewed and negative except as above ; Resp-denies wheezing  or productive cough,. Const- No unusual weight loss or fever; Neuro-no recent seizure or CVA ; GI- No BRBPR, abdom pain, bloating ; - no  hematuria   see supplement sheet, initialed and to be scanned by staff  Past Medical History:   Diagnosis Date    Diverticulitis 7/28/2009    HTN 7/28/2009    Hypercholesterolemia 7/28/2009    Hypothyroid 7/28/2009      Social Hx= reports that she has quit smoking. Her smoking use included Cigarettes. She has a 0.50 pack-year smoking history. She has never used smokeless tobacco. She reports that she drinks about 2.0 oz of alcohol per week  She reports that she does not use illicit drugs. Exam and Labs:  /76 (BP 1 Location: Left arm)  Pulse 60  Resp 16  Ht 5' 7\" (1.702 m)  Wt 151 lb (68.5 kg)  SpO2 94% Comment: Pt. has nail polish on. BMI 23.65 kg/z9Uvwplqshzshxhu:  NAD, comfortable  Head: NC,AT. Eyes: No scleral icterus. Neck:  Neck supple. No JVD present. Throat: moist mucous membranes. Chest: Effort normal & normal respiratory excursion . Neurological: alert, conversant and oriented . Skin: Skin is not cold. No obvious systemic rash noted. Not diaphoretic. No erythema. Psychiatric:  Grossly normal mood and affect. Behavior appears normal. Extremities:  no clubbing or cyanosis. Abdomen: non distended    Lungs:breath sounds normal. No stridor. distress, wheezes or  Rales. Heart: normal rate, regular rhythm, normal S1, S2, no murmurs, rubs, clicks or gallops , PMI non displaced. Edema: Edema is none.   Lab Results   Component Value Date/Time    Cholesterol, total 177 03/27/2012 11:19 AM    HDL Cholesterol 59 10/06/2014    HDL Cholesterol 64 03/27/2012 11:19 AM    LDL Cholesterol 95 10/06/2014    LDL, calculated 76 03/27/2012 11:19 AM    Triglyceride 183 (H) 03/27/2012 11:19 AM    Triglycerides 227 10/06/2014    CHOL/HDL Ratio 3.37 10/06/2014     Lab Results   Component Value Date/Time    Sodium 135 03/27/2012 11:19 AM    Potassium 4.0 03/27/2012 11:19 AM    Chloride 100 03/27/2012 11:19 AM    CO2 22 03/27/2012 11:19 AM    Glucose 94 03/27/2012 11:19 AM    BUN 13 03/27/2012 11:19 AM    Creatinine 0.77 03/27/2012 11:19 AM    BUN/Creatinine ratio 17 03/27/2012 11:19 AM    GFR est AA 97 03/27/2012 11:19 AM    GFR est non-AA 84 03/27/2012 11:19 AM    Calcium 9.2 03/27/2012 11:19 AM      Wt Readings from Last 3 Encounters:   04/26/18 151 lb (68.5 kg)   04/03/18 150 lb 9.6 oz (68.3 kg)   03/08/18 152 lb 3.2 oz (69 kg)         Current Outpatient Prescriptions   Medication Sig    esomeprazole (NEXIUM) 20 mg capsule Take  by mouth daily.  atorvastatin (LIPITOR) 80 mg tablet Take 1 Tab by mouth daily.  omega 3-dha-epa-fish oil (FISH OIL) 100-160-1,000 mg cap Take 2 Caps by mouth daily.  magnesium oxide (MAG-OX) 400 mg tablet Take 1 Tab by mouth daily.  atenolol (TENORMIN) 100 mg tablet Take 1 Tab by mouth daily.  hydroCHLOROthiazide (HYDRODIURIL) 12.5 mg tablet TAKE 1 TABLET BY MOUTH EVERY DAY    levothyroxine (SYNTHROID) 100 mcg tablet TAKE 1 TABLET DAILY BEFORE BREAKFAST    telmisartan (MICARDIS) 40 mg tablet TAKE 1 TABLET DAILY    DYMISTA 137-50 mcg/spray spry 2 Sprays by Nasal route as needed.  ibuprofen (MOTRIN) 800 mg tablet Take 1 Tab by mouth every eight (8) hours as needed for Pain.  loratadine (CLARITIN) 10 mg tablet Take 10 mg by mouth daily as needed.  aspirin delayed-release 81 mg tablet Take  by mouth daily.  ERGOCALCIFEROL, VITAMIN D2, (VITAMIN D2 PO) Take 4,000 Units by mouth daily. No current facility-administered medications for this visit. Impression see above.       Written by Cyndi Jerry, as dictated by Lisy Mckeon MD.

## 2018-04-26 NOTE — PATIENT INSTRUCTIONS
You will need to follow up in clinic with Dr. Clemente Moya in September. Please have lab work completed 1 week prior to office visit. A Chest CT angiogram has been ordered. Someone from the office will reach out to you to schedule this.

## 2018-04-26 NOTE — MR AVS SNAPSHOT
[address] [facility] 
[phone] Patient: Lilliana Simmons MRN:  [id number] Visit Information Date & Time Provider Department Dept. Phone Encounter #  
 4/26/2018  3:40 PM Benard Opitz, MD [facility] [phone] [id number] Your Appointments 6/28/2018  3:00 PM  
ESTABLISHED PATIENT with Benard Opitz, MD  
[facility] [place] ([facility] SCHEDULING) Appt Note: Pt wants to see Dr Paul Gomez on treadmill/bloodwork Ct calcium study Weyman Pencil to book per [address] [facility] [address] Pending sale to [facility] [address]  
[address] [facility] [address]  
  
    
 7/26/2018  2:15 PM  
ROUTINE CARE with Joselyn Ansari MD  
[facility]) Appt Note: f/u  
 [address] [place] [address]  
[phone]  
  
   
 [address] [facility] [address] Upcoming Health Maintenance Date Due Hepatitis C Screening 1950 COLONOSCOPY 9/25/1969 DTaP/Tdap/Td series (1 - Tdap) 9/25/1972 ZOSTER VACCINE AGE 60> 7/25/2011 GLAUCOMA SCREENING Q2Y 9/25/2016 Bone Densitometry (Dexa) Screening 9/25/2016 Pneumococcal 65+ Low/Medium Risk (1 of 2 - PCV13) 9/25/2016 Influenza Age 5 to Adult 8/1/2018 MEDICARE YEARLY EXAM 12/9/2018 BREAST CANCER SCRN MAMMOGRAM 2/13/2020 Allergies as of 4/26/2018  Review Complete On: 4/26/2018 By: Richard Velasquez Severity Noted Reaction Type Reactions Codeine  07/28/2009    Nausea and Vomiting Current Immunizations  Reviewed on 12/8/2017 No immunizations on file. Not reviewed this visit You Were Diagnosed With   
  
 Codes Comments Agatston CAC score, >400    -  Primary ICD-10-CM: R93.1 ICD-9-CM: 793.2  Equivocal stress test     ICD-10-CM: R94.39 
ICD-9-CM: 794.39   
 Hypercholesterolemia     ICD-10-CM: E78.00 ICD-9-CM: 272.0 Vitals BP Pulse Resp Height(growth percentile) Weight(growth percentile) SpO2  
 136/76 (BP 1 Location: Left arm) 60 16 5' 7\" (1.702 m) 151 lb (68.5 kg) 94% BMI OB Status Smoking Status 23.65 kg/m2 Postmenopausal Former Smoker Vitals History BMI and BSA Data Body Mass Index Body Surface Area  
 23.65 kg/m 2 1.8 m 2 Preferred Pharmacy Pharmacy Name Phone [id number] Twila Deleon, [address] [place] [address] [phone] Your Updated Medication List  
  
   
This list is accurate as of 4/26/18  4:35 PM.  Always use your most recent med list.  
  
  
  
  
 aspirin delayed-release 81 mg tablet Take  by mouth daily. atenolol 100 mg tablet Commonly known as:  TENORMIN Take 1 Tab by mouth daily. atorvastatin 80 mg tablet Commonly known as:  LIPITOR Take 1 Tab by mouth daily. CLARITIN 10 mg tablet Generic drug:  loratadine Take 10 mg by mouth daily as needed. DYMISTA 137-50 mcg/spray [place] Generic drug:  azelastine-fluticasone 2 Sprays by Nasal route as needed. FISH -160-1,000 mg Cap Generic drug:  omega 3-dha-epa-fish oil Take 2 Caps by mouth daily. hydroCHLOROthiazide 12.5 mg tablet Commonly known as:  HYDRODIURIL  
TAKE 1 TABLET BY MOUTH EVERY DAY  
  
 ibuprofen 800 mg tablet Commonly known as:  MOTRIN Take 1 Tab by mouth every eight (8) hours as needed for Pain.  
  
 levothyroxine 100 mcg tablet Commonly known as:  SYNTHROID  
TAKE 1 TABLET DAILY BEFORE BREAKFAST  
  
 magnesium oxide 400 mg tablet Commonly known as:  MAG-OX Take 1 Tab by mouth daily. NexIUM 20 mg capsule Generic drug:  esomeprazole Take  by mouth daily. telmisartan 40 mg tablet Commonly known as:  MICARDIS  
TAKE 1 TABLET DAILY  
  
 VITAMIN D2 PO Take 4,000 Units by mouth daily. To-Do List   
 04/26/2018 Imaging:  CTA CHEST W OR W WO CONT   
  
 08/26/2018 Lab:  [facility] Patient Instructions You will need to follow up in clinic with Dr. Lori Hollis in September. Please have lab work completed 1 week prior to office visit. A Chest CT angiogram has been ordered. Someone from the office will reach out to you to schedule this. Introducing [facility] SERVICES! Dear Sabine Slaughter: Thank you for requesting a Tag & See account. Our records indicate that you already have an active Tag & See account. You can access your account anytime at [email or web address] Did you know that you can access your hospital and ER discharge instructions at any time in Tag & See? You can also review all of your test results from your hospital stay or ER visit. Additional Information If you have questions, please visit the Frequently Asked Questions section of the Tag & See website at [email or web address] Remember, Tag & See is NOT to be used for urgent needs. For medical emergencies, dial 911. Now available from your iPhone and Android! Please provide this summary of care documentation to your next provider. Your primary care clinician is listed as Nomi 68. If you have any questions after today's visit, please call [phone].

## 2018-05-14 ENCOUNTER — TELEPHONE (OUTPATIENT)
Dept: CARDIOLOGY CLINIC | Age: 68
End: 2018-05-14

## 2018-05-14 ENCOUNTER — PATIENT MESSAGE (OUTPATIENT)
Dept: INTERNAL MEDICINE CLINIC | Age: 68
End: 2018-05-14

## 2018-05-14 DIAGNOSIS — I10 ESSENTIAL HYPERTENSION: ICD-10-CM

## 2018-05-14 RX ORDER — ATENOLOL 100 MG/1
100 TABLET ORAL DAILY
Qty: 30 TAB | Refills: 0 | Status: SHIPPED | OUTPATIENT
Start: 2018-05-14 | End: 2018-06-05 | Stop reason: SDUPTHER

## 2018-05-14 NOTE — TELEPHONE ENCOUNTER
From: Hector Lopez  To: Meme Box MD  Sent: 5/14/2018 6:45 AM EDT  Subject: Prescription Question    could you please [facility]ol [facility]. I went to refill it and it needs a new   Rx.  Thanks  Glorya Nageotte

## 2018-05-14 NOTE — TELEPHONE ENCOUNTER
Attempted to reach Vishal Casiano by telephone. A message was left for return call. Upon return call to inform her that Dr. Owens wants her to focus on the coronary vessels specifically for coronary artery disease.

## 2018-05-14 NOTE — TELEPHONE ENCOUNTER
Denny Bryant from the [facility] calling to get some confirmation on an order Dr. Atif Walls put in [address] [place] [address]. Denny Bryant is asking what specifically did Dr. Jabier Busch want them to focus on. She can be reached @ [phone]. Thanks!

## 2018-05-15 ENCOUNTER — TELEPHONE (OUTPATIENT)
Dept: CARDIOLOGY CLINIC | Age: 68
End: 2018-05-15

## 2018-05-15 DIAGNOSIS — E78.00 HYPERCHOLESTEROLEMIA: ICD-10-CM

## 2018-05-15 DIAGNOSIS — R93.1 AGATSTON CAC SCORE, >400: Primary | ICD-10-CM

## 2018-05-15 DIAGNOSIS — R94.39 EQUIVOCAL STRESS TEST: ICD-10-CM

## 2018-05-15 DIAGNOSIS — I73.9 PVD (PERIPHERAL VASCULAR DISEASE) (HCC): ICD-10-CM

## 2018-05-15 DIAGNOSIS — I10 ESSENTIAL HYPERTENSION: ICD-10-CM

## 2018-05-15 NOTE — TELEPHONE ENCOUNTER
Yuliet Marcelo called in and said that  the order should read: CT coronary artery. Thanks!   Phone [phone] [place]

## 2018-05-22 ENCOUNTER — HOSPITAL ENCOUNTER (OUTPATIENT)
Dept: CT IMAGING | Age: 68
Discharge: HOME | End: 2018-05-22
Attending: SPECIALIST
Payer: MEDICARE

## 2018-05-22 VITALS — DIASTOLIC BLOOD PRESSURE: 66 MMHG | SYSTOLIC BLOOD PRESSURE: 172 MMHG | HEART RATE: 78 BPM

## 2018-05-22 DIAGNOSIS — R93.1 AGATSTON CAC SCORE, >400: ICD-10-CM

## 2018-05-22 DIAGNOSIS — E78.00 HYPERCHOLESTEROLEMIA: ICD-10-CM

## 2018-05-22 DIAGNOSIS — R94.39 EQUIVOCAL STRESS TEST: ICD-10-CM

## 2018-05-22 DIAGNOSIS — I73.9 PVD (PERIPHERAL VASCULAR DISEASE) (HCC): ICD-10-CM

## 2018-05-22 DIAGNOSIS — I10 ESSENTIAL HYPERTENSION: ICD-10-CM

## 2018-05-22 LAB — CREAT BLD-MCNC: 0.8 MG/DL (ref 0.6–1.3)

## 2018-05-22 PROCEDURE — 82565 ASSAY OF CREATININE: CPT

## 2018-05-22 PROCEDURE — 75574 CT ANGIO HRT W/3D IMAGE: CPT

## 2018-05-22 PROCEDURE — 74011250637 HC RX REV CODE- 250/637: Performed by: RADIOLOGY

## 2018-05-22 PROCEDURE — 74011636320 HC RX REV CODE- 636/320: Performed by: RADIOLOGY

## 2018-05-22 RX ORDER — IODIXANOL 320 MG/ML
100 INJECTION, SOLUTION INTRAVASCULAR
Status: COMPLETED | OUTPATIENT
Start: 2018-05-22 | End: 2018-05-22

## 2018-05-22 RX ORDER — NITROGLYCERIN 0.4 MG/1
0.4 TABLET SUBLINGUAL
Status: COMPLETED | OUTPATIENT
Start: 2018-05-22 | End: 2018-05-22

## 2018-05-22 RX ADMIN — IODIXANOL 100 ML: 320 INJECTION, SOLUTION INTRAVASCULAR at 11:11

## 2018-05-22 RX ADMIN — NITROGLYCERIN 0.4 MG: 0.4 TABLET SUBLINGUAL at 11:10

## 2018-05-22 NOTE — PROGRESS NOTES
1050 - Pt to CT room. IV SL established by ARI Stern, RT. Pt tolerated well. Pt on beta blocker PTA. HR- 68. Multiple questions answered. 1102- CT scan begins. 1110- NTG given. IV patent. No complaints. HR- 74    1114- CT scan complete. Pt tolerated procedure well. 1125- IV D/C'd intact without problem. D/C instructions reviewed with pt and copy given. Additional questions answered. Verbalized understanding. D/C'd amb, stable, NAD.

## 2018-05-23 ENCOUNTER — TELEPHONE (OUTPATIENT)
Dept: CARDIOLOGY CLINIC | Age: 68
End: 2018-05-23

## 2018-05-23 NOTE — TELEPHONE ENCOUNTER
I dont see any result as of tonight  This was supposed to be read and arranged with Dr Pradip Hughes says Dr Foster Primes to read  Please clarify with radiology and let pt know we dont have a read yet

## 2018-05-23 NOTE — TELEPHONE ENCOUNTER
Verified patient with two types of identifiers. Patient is requesting results of CT.  Patient is very anxious regarding test.     Will ask MD and return call to patient

## 2018-05-24 NOTE — TELEPHONE ENCOUNTER
Verified patient with two types of identifiers. Spoke with patient. She is aware that results are not ready and that the office will call her when the report has been read. Verbalizes understanding. And will call with any questions or concerns.

## 2018-05-25 NOTE — TELEPHONE ENCOUNTER
----- Message from Johanna Mathias MD sent at 5/25/2018 12:47 PM EDT -----  Heavy Ca spoke to [facility] and spoke to patient -ID X2   She is with no pain or sob and taking her ASA and statin  She needs cath but with no acute angina can do electively  She is out of town until Monday then leaving for [place] June 6-11 and wants to wait until after  Put her on for Friday June 15th in am with Enrrique Botello to bu  Needs usual labs, etc but not CXR

## 2018-05-25 NOTE — PROGRESS NOTES
Heavy Ca spoke to [facility] and spoke to patient -ID X2   She is with no pain or sob and taking her ASA and statin  She needs cath but with no acute angina can do electively  She is out of town until Monday then leaving for [place] June 6-11 and wants to wait until after  Put her on for Friday June 15th in am with Ron Baeza to bu  Needs usual labs, etc but not CXR

## 2018-05-29 ENCOUNTER — TELEPHONE (OUTPATIENT)
Dept: CARDIOLOGY CLINIC | Age: 68
End: 2018-05-29

## 2018-05-29 NOTE — TELEPHONE ENCOUNTER
Saniya Alfaro 8 minutes ago (2:32 PM)                 Patient would like to speak with you regarding upcoming heart cath. Patient has some questions.   Phone [phone] til 4pm  Phone [phone]  Darren Mccoy                      Documentation

## 2018-05-29 NOTE — TELEPHONE ENCOUNTER
Verified patient with two types of identifiers. Patient is scheduled for cath 6/15/18. Patient reports that she had the dates wrong regarding [place] vacation. She won't be back in town until Friday, 6/15/18. Patient had a lot of questions regarding severity of need for cardiac cath and appropriateness of timing and vacation.  Office visit appointment was scheduled for Monday 6/4/18 @ 820 to discuss with MD

## 2018-05-29 NOTE — TELEPHONE ENCOUNTER
Patient would like to speak with you regarding upcoming heart cath. Patient has some questions.   Phone [phone] til 4pm  Phone [phone]  Clearance Scale

## 2018-06-04 ENCOUNTER — OFFICE VISIT (OUTPATIENT)
Dept: CARDIOLOGY CLINIC | Age: 68
End: 2018-06-04

## 2018-06-04 VITALS
HEART RATE: 56 BPM | WEIGHT: 150 LBS | HEIGHT: 67 IN | BODY MASS INDEX: 23.54 KG/M2 | OXYGEN SATURATION: 99 % | DIASTOLIC BLOOD PRESSURE: 60 MMHG | RESPIRATION RATE: 16 BRPM | SYSTOLIC BLOOD PRESSURE: 140 MMHG

## 2018-06-04 DIAGNOSIS — Z87.891 EX-SMOKER: ICD-10-CM

## 2018-06-04 DIAGNOSIS — I25.10 CALCIFIC CORONARY ARTERIOSCLEROSIS: ICD-10-CM

## 2018-06-04 DIAGNOSIS — I73.9 PVD (PERIPHERAL VASCULAR DISEASE) (HCC): ICD-10-CM

## 2018-06-04 DIAGNOSIS — Z01.818 PREOP TESTING: ICD-10-CM

## 2018-06-04 DIAGNOSIS — R93.1 AGATSTON CAC SCORE, >400: Primary | ICD-10-CM

## 2018-06-04 DIAGNOSIS — I10 ESSENTIAL HYPERTENSION: ICD-10-CM

## 2018-06-04 DIAGNOSIS — E78.00 HYPERCHOLESTEROLEMIA: ICD-10-CM

## 2018-06-04 RX ORDER — NITROGLYCERIN 0.4 MG/1
0.4 TABLET SUBLINGUAL
Qty: 1 BOTTLE | Refills: 1 | Status: SHIPPED | OUTPATIENT
Start: 2018-06-04 | End: 2020-05-28 | Stop reason: SDUPTHER

## 2018-06-04 NOTE — PATIENT INSTRUCTIONS
Your Cardiac Catheterization procedure has been scheduled for (I will call you with the new date and time), at [facility].    Please report to Admitting Department 2 hours prior to your scheduled procedure. Please bring a list of your current medications and medication bottles, if able, to the hospital on this day. You will be unable to drive after your procedure so please make sure to bring someone with you to your procedure. You will need to have nothing to eat or drink after midnight, the night prior to your procedure. You may have small sips of water, if needed, to take with your medication. You will need labs drawn prior to your procedure.  Please go to [facility] to have this done the week prior to your procedure

## 2018-06-04 NOTE — PROGRESS NOTES
Rm Mckinley     1950       Lior Delgado MD, Dang Moore  Date of Visit-6/4/2018   PCP is Peter Georges MD   [address] [place] [address] [facility] [place]  [facility]  HPI:  Rm Mckinley is a 78 y.o. female   Subjective:  Patient had a calcium score elevated at 2,039, previously followed by Dr. Greg Vera. I take care of her  so she switched over to us and saw me on 04/26/18. Her Lipitor had been increased to 80 and it was recommended she get a stress echo. The stress echo showed her to walk five minutes. She got appropriate heart rate and had no difficulty with chest pain, though some shortness of breath. She had EKG changes, but no echo image changes. Based on this I felt it would be equivocal and recommended she get a consideration for CT scan. Dr. Hector Saul  read it to show severe calcification at several significant points throughout the coronary tree. The patient denies she had a lot of risk factors, says been treated for 35 years, wanted to come back and talk before the cath. She is being nervous about getting the IV and very nervous about how she'll feel during the cath, though she's very comfortable with us and the procedure and understands why we're doing it. She has risk factors of hypertension, dyslipidemia, peripheral vascular disease and also chronic smoker until recently. She has normal kidney function. She denies chest pain, shortness of breath, swelling, syncope, tachycardia or palpitations. Cardiac history/testing  CT coronary arteries 05/22/18 - left main ostial heavy calcification and left main distally. LAD heavy calcification proximal distal portion and heavy calcification D1. Left circumflex heavy calcified plaque in the proximal portion. RCA heavy calcified plaque of mid and distal RCA. Calcium score 03/29/18 showed calcium score of 2,039, large amount of calcium in LAD and RCA.   Carotid dopplers 02/13/18 showed less than 50% stenosis of right internal and less than 50% of the left internal carotid arteries. CT scan 2015 showed calcium in the left main. CT done for pulmonary nodule previously. Stress echo 04/11/18, Dr. Jada Booth - EKG changes, non diagnostic, walked 5 minutes, no echoic changes. Assessment/Plan:     Impression/Plan:  1. Patient with a very high calcium score, CT scan confirms multiple areas of secondary and high grade calcification. She had a stress echo that was somewhat equivocal based on EKG and echo information. She is without chest pain or shortness of breath. She is in some denial about her risk factors, saying she doesn't understand why she'd have coronary disease. I pointed out she was a smoker till very recently, hypertension, high cholesterol, family history and PVD. 2. Essentially, however, she is very comfortable to proceed with cath, just wanted to go over the procedure and how we can handle fear of the needles. What I've told her is that we could give her some sedation in the preop area prior to going back once she's under consent. She is comfortable with this plan. She is comfortable to proceed with Fentanyl and Versed. She understands the reaction previously to  codeine may not actually transfer to meds like  Fentanyl for example. 3. I'm going to give her SL nitro. She is going on a trip and would feel more comfortable, though she's not had any recent chest pain. 4. We decided the schedule for June 22nd.     risks and benefits discussed  2/1000 serious life threatening risk includes stroke, heart attack leading to  death  1/100 prolong hospital stay above and bleeding, groin complications, infection, renals possible but  unlikely unless baseline renal dysfunction, tear in cardiac vessel, tamponade  PCI 4-0/882 similar complications but benefits likely outweigh risk  Future Appointments  Date Time Provider Felisha Espino   6/29/2018 9:30 AM CATH ROOM 1 [address] [place] [address]   7/26/2018 2:15 PM Seth Wong MD [address] [facility] 256   9/27/2018 3:20 PM Lico Pascual  [address]      Patient Instructions   Your Cardiac Catheterization procedure has been scheduled for (I will call you with the new date and time), at [facility].    Please report to Admitting Department 2 hours prior to your scheduled procedure. Please bring a list of your current medications and medication bottles, if able, to the hospital on this day. You will be unable to drive after your procedure so please make sure to bring someone with you to your procedure. You will need to have nothing to eat or drink after midnight, the night prior to your procedure. You may have small sips of water, if needed, to take with your medication. You will need labs drawn prior to your procedure. Please go to [facility] to have this done the week prior to your procedure                 Key CAD CHF Meds             nitroglycerin (NITROSTAT) 0.4 mg SL tablet  (Taking) 1 Tab by SubLINGual route every five (5) minutes as needed for Chest Pain. atenolol (TENORMIN) 100 mg tablet  (Taking/Discontinued) Take 1 Tab by mouth daily. atorvastatin (LIPITOR) 80 mg tablet  (Taking) Take 1 Tab by mouth daily. omega 3-dha-epa-fish oil (FISH OIL) 100-160-1,000 mg cap  (Taking) Take 2 Caps by mouth daily. hydroCHLOROthiazide (HYDRODIURIL) 12.5 mg tablet  (Taking) TAKE 1 TABLET BY MOUTH EVERY DAY    telmisartan (MICARDIS) 40 mg tablet  (Taking) TAKE 1 TABLET DAILY    aspirin delayed-release 81 mg tablet  (Taking) Take  by mouth daily. Impression:   1. Agatston CAC score, >400    2. Calcific coronary arteriosclerosis    3. Essential hypertension    4. Hypercholesterolemia    5. PVD (peripheral vascular disease) ([address]    6. Ex-smoker    7. Preop testing       ROS-except as noted above. . A complete cardiac and respiratory are reviewed and negative except as above ; Resp-denies wheezing  or productive cough,.  Const- No unusual weight loss or fever; Neuro-no recent seizure or CVA ; GI- No BRBPR, abdom pain, bloating ; - no  hematuria   see supplement sheet, initialed and to be scanned by staff  Past Medical History:   Diagnosis Date    Diverticulitis 7/28/2009    HTN 7/28/2009    Hypercholesterolemia 7/28/2009    Hypothyroid 7/28/2009      Social Hx= reports that she has quit smoking. Her smoking use included Cigarettes. She has a 0.50 pack-year smoking history. She has never used smokeless tobacco. She reports that she drinks about 2.0 oz of alcohol per week  She reports that she does not use illicit drugs. Exam and Labs:  /60 (BP 1 Location: Left arm, BP Patient Position: Sitting)  Pulse (!) 56  Resp 16  Ht 5' 7\" (1.702 m)  Wt 150 lb (68 kg)  SpO2 99%  BMI 23.49 kg/[id number]:  NAD, comfortable  Head: NC,AT. Eyes: No scleral icterus. Neck:  Neck supple. No JVD present. Throat: moist mucous membranes. Chest: Effort normal & normal respiratory excursion . Neurological: alert, conversant and oriented . Skin: Skin is not cold. No obvious systemic rash noted. Not diaphoretic. No erythema. Psychiatric:  Grossly normal mood and affect. Behavior appears normal. Extremities:  no clubbing or cyanosis. Abdomen: non distended    Lungs:breath sounds normal. No stridor. distress, wheezes or  Rales. Heart:    normal rate, regular rhythm, normal S1, S2, no murmurs, rubs, clicks or gallops , PMI non displaced. Edema: Edema is none.   Lab Results   Component Value Date/Time    Cholesterol, total 177 03/27/2012 11:19 AM    HDL Cholesterol 59 10/06/2014    HDL Cholesterol 64 03/27/2012 11:19 AM    LDL Cholesterol 95 10/06/2014    LDL, calculated 76 03/27/2012 11:19 AM    Triglyceride 183 (H) 03/27/2012 11:19 AM    Triglycerides 227 10/06/2014    CHOL/HDL Ratio 3.37 10/06/2014     Lab Results   Component Value Date/Time    Sodium 135 03/27/2012 11:19 AM    Potassium 4.0 03/27/2012 11:19 AM    Chloride 100 03/27/2012 11:19 AM    CO2 22 03/27/2012 11:19 AM    Glucose 94 03/27/2012 11:19 AM    BUN 13 03/27/2012 11:19 AM    Creatinine 0.77 03/27/2012 11:19 AM    BUN/Creatinine ratio 17 03/27/2012 11:19 AM    GFR est AA 97 03/27/2012 11:19 AM    GFR est non-AA 84 03/27/2012 11:19 AM    Calcium 9.2 03/27/2012 11:19 AM      Wt Readings from Last 3 Encounters:   06/04/18 150 lb (68 kg)   04/26/18 151 lb (68.5 kg)   04/03/18 150 lb 9.6 oz (68.3 kg)      BP Readings from Last 3 Encounters:   06/04/18 140/60   05/22/18 172/66   04/26/18 136/76      Current Outpatient Prescriptions   Medication Sig    atenolol (TENORMIN) 100 mg tablet Take 1 Tab by mouth daily.  esomeprazole (NEXIUM) 20 mg capsule Take 20 mg by mouth every other day.  atorvastatin (LIPITOR) 80 mg tablet Take 1 Tab by mouth daily.  omega 3-dha-epa-fish oil (FISH OIL) 100-160-1,000 mg cap Take 2 Caps by mouth daily.  magnesium oxide (MAG-OX) 400 mg tablet Take 1 Tab by mouth daily.  hydroCHLOROthiazide (HYDRODIURIL) 12.5 mg tablet TAKE 1 TABLET BY MOUTH EVERY DAY    levothyroxine (SYNTHROID) 100 mcg tablet TAKE 1 TABLET DAILY BEFORE BREAKFAST    telmisartan (MICARDIS) 40 mg tablet TAKE 1 TABLET DAILY    ibuprofen (MOTRIN) 800 mg tablet Take 1 Tab by mouth every eight (8) hours as needed for Pain. (Patient taking differently: Take 400 mg by mouth as needed for Pain.)    aspirin delayed-release 81 mg tablet Take  by mouth daily.  DYMISTA 137-50 mcg/spray spry 2 Sprays by Nasal route as needed.  loratadine (CLARITIN) 10 mg tablet Take 10 mg by mouth daily as needed.  ERGOCALCIFEROL, VITAMIN D2, (VITAMIN D2 PO) Take 4,000 Units by mouth daily. No current facility-administered medications for this visit. Impression see above.

## 2018-06-04 NOTE — MR AVS SNAPSHOT
[address] [facility] 57 
[phone] Patient: Cosme Councilman MRN:  [id number] Visit Information Date & Time Provider Department Dept. Phone Encounter #  
 6/4/2018  8:20 AM Abner Aj MD [facility] [place] [phone] [id number] Your Appointments 7/26/2018  2:15 PM  
ROUTINE CARE with Sarina Ruiz MD  
[facility] [address]) Appt Note: f/u  
 [address]  
  
   
 [address]nie [address] [id number]  
  
    
 9/27/2018  3:20 PM  
ESTABLISHED PATIENT with Abner Aj MD  
[facility] ([facility] SCHEDULING) Appt Note: Pt wants to see Dr Pierre Peacock on treadmill/bloodwork Ct calcium study [place] Lint to book per Annette Lakhani; f/u  
 [address] [facility] [address] [facility]  
[address] [facility] [id number] Upcoming Health Maintenance Date Due Hepatitis C Screening 1950 COLONOSCOPY 9/25/1969 DTaP/Tdap/Td series (1 - Tdap) 9/25/1972 ZOSTER VACCINE AGE 60> 7/25/2011 GLAUCOMA SCREENING Q2Y 9/25/2016 Bone Densitometry (Dexa) Screening 9/25/2016 Pneumococcal 65+ Low/Medium Risk (1 of 2 - PCV13) 9/25/2016 Influenza Age 5 to Adult 8/1/2018 MEDICARE YEARLY EXAM 12/9/2018 BREAST CANCER SCRN MAMMOGRAM 2/13/2020 Allergies as of 6/4/2018  Review Complete On: 6/4/2018 By: Abner Aj MD  
  
 Severity Noted Reaction Type Reactions Codeine  07/28/2009    Nausea and Vomiting Current Immunizations  Reviewed on 12/8/2017 No immunizations on file. Not reviewed this visit You Were Diagnosed With   
  
 Codes Comments Agatston CAC score, >400    -  Primary ICD-10-CM: R93.1 ICD-9-CM: 793.2 Calcific coronary arteriosclerosis     ICD-10-CM: I25.10 ICD-9-CM: 414.00 Vitals BP Pulse Resp Height(growth percentile) Weight(growth percentile) SpO2  
 140/60 (BP 1 Location: Left arm, BP Patient Position: Sitting) (!) 56 16 5' 7\" (1.702 m) 150 lb (68 kg) 99% BMI OB Status Smoking Status 23.49 kg/m2 Postmenopausal Former Smoker BMI and BSA Data Body Mass Index Body Surface Area  
 23.49 kg/m 2 1.79 m 2 Preferred Pharmacy Pharmacy Name Phone [id number] Yeni Chowdhury, [address] [place] [address] [phone] Your Updated Medication List  
  
   
This list is accurate as of 6/4/18  9:02 AM.  Always use your most recent med list.  
  
  
  
  
 aspirin delayed-release 81 mg tablet Take  by mouth daily. atenolol 100 mg tablet Commonly known as:  TENORMIN Take 1 Tab by mouth daily. atorvastatin 80 mg tablet Commonly known as:  LIPITOR Take 1 Tab by mouth daily. CLARITIN 10 mg tablet Generic drug:  loratadine Take 10 mg by mouth daily as needed. DYMISTA 137-50 mcg/spray [place] Generic drug:  azelastine-fluticasone 2 Sprays by Nasal route as needed. FISH -160-1,000 mg Cap Generic drug:  omega 3-dha-epa-fish oil Take 2 Caps by mouth daily. hydroCHLOROthiazide 12.5 mg tablet Commonly known as:  HYDRODIURIL  
TAKE 1 TABLET BY MOUTH EVERY DAY  
  
 ibuprofen 800 mg tablet Commonly known as:  MOTRIN Take 1 Tab by mouth every eight (8) hours as needed for Pain.  
  
 levothyroxine 100 mcg tablet Commonly known as:  SYNTHROID  
TAKE 1 TABLET DAILY BEFORE BREAKFAST  
  
 magnesium oxide 400 mg tablet Commonly known as:  MAG-OX Take 1 Tab by mouth daily. NexIUM 20 mg capsule Generic drug:  esomeprazole Take 20 mg by mouth every other day. nitroglycerin 0.4 mg SL tablet Commonly known as:  NITROSTAT  
1 Tab by SubLINGual route every five (5) minutes as needed for Chest Pain. telmisartan 40 mg tablet Commonly known as:  MICARDIS  
TAKE 1 TABLET DAILY  
  
 VITAMIN D2 PO Take 4,000 Units by mouth daily. Prescriptions Sent to Pharmacy Refills  
 nitroglycerin (NITROSTAT) 0.4 mg SL tablet 1 Si Tab by SubLINGual route every five (5) minutes as needed for Chest Pain. Class: Normal  
 Pharmacy[facility], [address] [place] [address] #: [phone] Route: SubLINGual  
  
To-Do List   
 06/15/2018 9:30 AM  
  Appointment with CATH ROOM 1 [facility] ([phone]) NPO AFTER MIDNIGHT! ROUTINE CASES:  Please arrive 2 hours prior to your scheduled appointment time. If your scheduled appointment is for 7:30am, 8:00am or 8:15am, please arrive by 6:45am.  NON ROUTINE CASES:  1. If you require labs, x-ray, EKG or meds-please arrive 3 hours prior to your appointment time. 2.  If you require hydration prior to your procedure-please arrive 4 hours prior to your appointment time. ** It is the [employer] responsibility to notify the Cath Lab  of any prep required outside of the normal routine case** Patient Instructions Your Cardiac Catheterization procedure has been scheduled for (I will call you with the new date and time), at [facility] 
 
Please report to Admitting Department 2 hours prior to your scheduled procedure. Please bring a list of your current medications and medication bottles, if able, to the hospital on this day. You will be unable to drive after your procedure so please make sure to bring someone with you to your procedure. You will need to have nothing to eat or drink after midnight, the night prior to your procedure. You may have small sips of water, if needed, to take with your medication. You will need labs drawn prior to your procedure. Please go to [facility] to have this done the week prior to your procedure Introducing [facility]! Dear Román Lyman: Thank you for requesting a [employer] account. Our records indicate that you already have an active Add[employer] account. You can access your account anytime at [email or web address] Did you know that you can access your hospital and ER discharge instructions at any time in [employer]? You can also review all of your test results from your hospital stay or ER visit. Additional Information If you have questions, please visit the Frequently Asked Questions section of the [employer] website at [email or web address] Remember, [employer] is NOT to be used for urgent needs. For medical emergencies, dial 911. Now available from your iPhone and Android! Please provide this summary of care documentation to your next provider. Your primary care clinician is listed as Nomi 68. If you have any questions after today's visit, please call [phone].

## 2018-06-05 DIAGNOSIS — I10 ESSENTIAL HYPERTENSION: ICD-10-CM

## 2018-06-05 RX ORDER — ATENOLOL 100 MG/1
100 TABLET ORAL DAILY
Qty: 90 TAB | Refills: 0 | Status: SHIPPED | OUTPATIENT
Start: 2018-06-05 | End: 2018-08-31 | Stop reason: SDUPTHER

## 2018-06-06 ENCOUNTER — TELEPHONE (OUTPATIENT)
Dept: CARDIOLOGY CLINIC | Age: 68
End: 2018-06-06

## 2018-06-06 NOTE — TELEPHONE ENCOUNTER
Patient is calling you to follow up regarding her heart cath date & time. Patient stated that she is going out of town.   Phone [phone]

## 2018-06-06 NOTE — TELEPHONE ENCOUNTER
Verified patient with two types of identifiers. Informed pt that cath was scheduled for 6/29/18 @ 9:30 AM. Instructions were reviewed with patient at [address] [place] [address]. Verbalizes understanding. And will call with any questions or concerns.

## 2018-06-21 LAB
CREATININE, EXTERNAL: 0.8
HBA1C MFR BLD HPLC: 6.4 %
LDL-C, EXTERNAL: 71

## 2018-06-22 ENCOUNTER — TELEPHONE (OUTPATIENT)
Dept: CARDIOLOGY CLINIC | Age: 68
End: 2018-06-22

## 2018-06-22 RX ORDER — SODIUM CHLORIDE 9 MG/ML
75 INJECTION, SOLUTION INTRAVENOUS CONTINUOUS
Status: CANCELLED | OUTPATIENT
Start: 2018-06-29 | End: 2018-06-29

## 2018-06-22 RX ORDER — DIPHENHYDRAMINE HYDROCHLORIDE 50 MG/ML
25 INJECTION, SOLUTION INTRAMUSCULAR; INTRAVENOUS
Status: CANCELLED | OUTPATIENT
Start: 2018-06-29 | End: 2018-06-30

## 2018-06-22 RX ORDER — SODIUM CHLORIDE 0.9 % (FLUSH) 0.9 %
5-10 SYRINGE (ML) INJECTION EVERY 8 HOURS
Status: CANCELLED | OUTPATIENT
Start: 2018-06-29

## 2018-06-22 RX ORDER — SODIUM CHLORIDE 0.9 % (FLUSH) 0.9 %
5-10 SYRINGE (ML) INJECTION AS NEEDED
Status: CANCELLED | OUTPATIENT
Start: 2018-06-29

## 2018-06-22 NOTE — TELEPHONE ENCOUNTER
Patient would like a return call regarding any pre-op for upcoming cath. She can be reached at [phone].  Thank you

## 2018-06-25 NOTE — TELEPHONE ENCOUNTER
Patient is returning your call.  She says the time for a call back does not matter   Phone: [phone]

## 2018-06-25 NOTE — TELEPHONE ENCOUNTER
Verified patient with two types of identifiers. Verified instructions with pt. Verbalizes understanding. And will call with any questions or concerns.

## 2018-06-29 ENCOUNTER — HOSPITAL ENCOUNTER (OUTPATIENT)
Dept: CARDIAC CATH/INVASIVE PROCEDURES | Age: 68
Discharge: HOME | End: 2018-06-29
Attending: SPECIALIST | Admitting: SPECIALIST
Payer: MEDICARE

## 2018-06-29 VITALS
SYSTOLIC BLOOD PRESSURE: 118 MMHG | DIASTOLIC BLOOD PRESSURE: 43 MMHG | TEMPERATURE: 98.4 F | WEIGHT: 147.5 LBS | HEIGHT: 66 IN | RESPIRATION RATE: 16 BRPM | BODY MASS INDEX: 23.7 KG/M2 | HEART RATE: 57 BPM | OXYGEN SATURATION: 100 %

## 2018-06-29 DIAGNOSIS — E03.4 HYPOTHYROIDISM DUE TO ACQUIRED ATROPHY OF THYROID: ICD-10-CM

## 2018-06-29 PROBLEM — I25.10 CAD (CORONARY ARTERY DISEASE), NATIVE CORONARY ARTERY: Status: ACTIVE | Noted: 2018-06-29

## 2018-06-29 PROCEDURE — C1894 INTRO/SHEATH, NON-LASER: HCPCS

## 2018-06-29 PROCEDURE — 77030004533 HC CATH ANGI DX IMP BSC -B

## 2018-06-29 PROCEDURE — 74011000258 HC RX REV CODE- 258: Performed by: SPECIALIST

## 2018-06-29 PROCEDURE — C1760 CLOSURE DEV, VASC: HCPCS

## 2018-06-29 PROCEDURE — C1769 GUIDE WIRE: HCPCS

## 2018-06-29 PROCEDURE — 74011250636 HC RX REV CODE- 250/636

## 2018-06-29 PROCEDURE — 74011250636 HC RX REV CODE- 250/636: Performed by: INTERNAL MEDICINE

## 2018-06-29 PROCEDURE — 74011000250 HC RX REV CODE- 250: Performed by: SPECIALIST

## 2018-06-29 PROCEDURE — 77030013744

## 2018-06-29 PROCEDURE — 74011636320 HC RX REV CODE- 636/320: Performed by: SPECIALIST

## 2018-06-29 PROCEDURE — 99153 MOD SED SAME PHYS/QHP EA: CPT

## 2018-06-29 PROCEDURE — C1887 CATHETER, GUIDING: HCPCS

## 2018-06-29 PROCEDURE — 74011250636 HC RX REV CODE- 250/636: Performed by: SPECIALIST

## 2018-06-29 PROCEDURE — 74011000258 HC RX REV CODE- 258: Performed by: INTERNAL MEDICINE

## 2018-06-29 RX ORDER — DIPHENHYDRAMINE HYDROCHLORIDE 50 MG/ML
INJECTION, SOLUTION INTRAMUSCULAR; INTRAVENOUS
Status: COMPLETED
Start: 2018-06-29 | End: 2018-06-29

## 2018-06-29 RX ORDER — SODIUM CHLORIDE 0.9 % (FLUSH) 0.9 %
5-10 SYRINGE (ML) INJECTION AS NEEDED
Status: CANCELLED | OUTPATIENT
Start: 2018-06-29

## 2018-06-29 RX ORDER — MIDAZOLAM HYDROCHLORIDE 1 MG/ML
.5-1 INJECTION, SOLUTION INTRAMUSCULAR; INTRAVENOUS
Status: DISCONTINUED | OUTPATIENT
Start: 2018-06-29 | End: 2018-06-29

## 2018-06-29 RX ORDER — SODIUM CHLORIDE 0.9 % (FLUSH) 0.9 %
10 SYRINGE (ML) INJECTION AS NEEDED
Status: DISCONTINUED | OUTPATIENT
Start: 2018-06-29 | End: 2018-06-29

## 2018-06-29 RX ORDER — LEVOTHYROXINE SODIUM 100 UG/1
TABLET ORAL
Qty: 90 TAB | Refills: 1 | Status: SHIPPED | OUTPATIENT
Start: 2018-06-29 | End: 2018-12-29 | Stop reason: SDUPTHER

## 2018-06-29 RX ORDER — ACETAMINOPHEN 325 MG/1
650 TABLET ORAL
Status: CANCELLED | OUTPATIENT
Start: 2018-06-29

## 2018-06-29 RX ORDER — HEPARIN SODIUM 200 [USP'U]/100ML
1000 INJECTION, SOLUTION INTRAVENOUS CONTINUOUS
Status: DISCONTINUED | OUTPATIENT
Start: 2018-06-29 | End: 2018-06-29

## 2018-06-29 RX ORDER — HEPARIN SODIUM 1000 [USP'U]/ML
1000-5000 INJECTION, SOLUTION INTRAVENOUS; SUBCUTANEOUS
Status: DISCONTINUED | OUTPATIENT
Start: 2018-06-29 | End: 2018-06-29

## 2018-06-29 RX ORDER — DIPHENHYDRAMINE HYDROCHLORIDE 50 MG/ML
50 INJECTION, SOLUTION INTRAMUSCULAR; INTRAVENOUS
Status: CANCELLED | OUTPATIENT
Start: 2018-06-29

## 2018-06-29 RX ORDER — LIDOCAINE HYDROCHLORIDE 10 MG/ML
10-30 INJECTION INFILTRATION; PERINEURAL
Status: COMPLETED | OUTPATIENT
Start: 2018-06-29 | End: 2018-06-29

## 2018-06-29 RX ORDER — SODIUM CHLORIDE 9 MG/ML
3 INJECTION, SOLUTION INTRAVENOUS CONTINUOUS
Status: DISPENSED | OUTPATIENT
Start: 2018-06-29 | End: 2018-06-29

## 2018-06-29 RX ORDER — SODIUM CHLORIDE 9 MG/ML
75 INJECTION, SOLUTION INTRAVENOUS CONTINUOUS
Status: DISCONTINUED | OUTPATIENT
Start: 2018-06-29 | End: 2018-06-29

## 2018-06-29 RX ORDER — DIPHENHYDRAMINE HYDROCHLORIDE 50 MG/ML
25 INJECTION, SOLUTION INTRAMUSCULAR; INTRAVENOUS
Status: COMPLETED | OUTPATIENT
Start: 2018-06-29 | End: 2018-06-29

## 2018-06-29 RX ORDER — SODIUM CHLORIDE 9 MG/ML
1.5 INJECTION, SOLUTION INTRAVENOUS CONTINUOUS
Status: DISPENSED | OUTPATIENT
Start: 2018-06-29 | End: 2018-06-29

## 2018-06-29 RX ORDER — HYDROCODONE BITARTRATE AND ACETAMINOPHEN 7.5; 325 MG/1; MG/1
1 TABLET ORAL
Status: CANCELLED | OUTPATIENT
Start: 2018-06-29

## 2018-06-29 RX ORDER — FENTANYL CITRATE 50 UG/ML
25-200 INJECTION, SOLUTION INTRAMUSCULAR; INTRAVENOUS
Status: DISCONTINUED | OUTPATIENT
Start: 2018-06-29 | End: 2018-06-29

## 2018-06-29 RX ORDER — SODIUM CHLORIDE 0.9 % (FLUSH) 0.9 %
5-10 SYRINGE (ML) INJECTION EVERY 8 HOURS
Status: DISCONTINUED | OUTPATIENT
Start: 2018-06-29 | End: 2018-06-29

## 2018-06-29 RX ORDER — SODIUM CHLORIDE 0.9 % (FLUSH) 0.9 %
5-10 SYRINGE (ML) INJECTION AS NEEDED
Status: DISCONTINUED | OUTPATIENT
Start: 2018-06-29 | End: 2018-06-29

## 2018-06-29 RX ORDER — CLOPIDOGREL 300 MG/1
600 TABLET, FILM COATED ORAL AS NEEDED
Status: DISCONTINUED | OUTPATIENT
Start: 2018-06-29 | End: 2018-06-29

## 2018-06-29 RX ORDER — ATROPINE SULFATE 0.1 MG/ML
1 INJECTION INTRAVENOUS AS NEEDED
Status: DISCONTINUED | OUTPATIENT
Start: 2018-06-29 | End: 2018-06-29

## 2018-06-29 RX ORDER — SODIUM CHLORIDE 0.9 % (FLUSH) 0.9 %
5-10 SYRINGE (ML) INJECTION EVERY 8 HOURS
Status: CANCELLED | OUTPATIENT
Start: 2018-06-29

## 2018-06-29 RX ADMIN — FENTANYL CITRATE 50 MCG: 50 INJECTION, SOLUTION INTRAMUSCULAR; INTRAVENOUS at 10:46

## 2018-06-29 RX ADMIN — MIDAZOLAM HYDROCHLORIDE 2 MG: 1 INJECTION, SOLUTION INTRAMUSCULAR; INTRAVENOUS at 10:00

## 2018-06-29 RX ADMIN — BIVALIRUDIN 117.08 MG/HR: 250 INJECTION, POWDER, LYOPHILIZED, FOR SOLUTION INTRAVENOUS at 10:26

## 2018-06-29 RX ADMIN — FENTANYL CITRATE 50 MCG: 50 INJECTION, SOLUTION INTRAMUSCULAR; INTRAVENOUS at 10:29

## 2018-06-29 RX ADMIN — IOPAMIDOL 132 ML: 755 INJECTION, SOLUTION INTRAVENOUS at 10:49

## 2018-06-29 RX ADMIN — DIPHENHYDRAMINE HYDROCHLORIDE 25 MG: 50 INJECTION, SOLUTION INTRAMUSCULAR; INTRAVENOUS at 09:47

## 2018-06-29 RX ADMIN — HEPARIN SODIUM 2000 UNITS: 200 INJECTION, SOLUTION INTRAVENOUS at 09:54

## 2018-06-29 RX ADMIN — FENTANYL CITRATE 50 MCG: 50 INJECTION, SOLUTION INTRAMUSCULAR; INTRAVENOUS at 10:00

## 2018-06-29 RX ADMIN — SODIUM CHLORIDE 3 ML/KG/HR: 900 INJECTION, SOLUTION INTRAVENOUS at 08:32

## 2018-06-29 RX ADMIN — LIDOCAINE HYDROCHLORIDE 10 ML: 10 INJECTION, SOLUTION INFILTRATION; PERINEURAL at 10:02

## 2018-06-29 RX ADMIN — MIDAZOLAM HYDROCHLORIDE 2 MG: 1 INJECTION, SOLUTION INTRAMUSCULAR; INTRAVENOUS at 09:53

## 2018-06-29 RX ADMIN — MIDAZOLAM HYDROCHLORIDE 2 MG: 1 INJECTION, SOLUTION INTRAMUSCULAR; INTRAVENOUS at 10:29

## 2018-06-29 RX ADMIN — IOPAMIDOL 50 ML: 755 INJECTION, SOLUTION INTRAVENOUS at 10:32

## 2018-06-29 RX ADMIN — SODIUM CHLORIDE 1.5 ML/KG/HR: 900 INJECTION, SOLUTION INTRAVENOUS at 09:27

## 2018-06-29 RX ADMIN — MIDAZOLAM HYDROCHLORIDE 1 MG: 1 INJECTION, SOLUTION INTRAMUSCULAR; INTRAVENOUS at 10:46

## 2018-06-29 RX ADMIN — MIDAZOLAM HYDROCHLORIDE 1 MG: 1 INJECTION, SOLUTION INTRAMUSCULAR; INTRAVENOUS at 10:07

## 2018-06-29 RX ADMIN — ADENOSINE 180 MCG/KG/MIN: 3 SOLUTION INTRAVENOUS at 10:38

## 2018-06-29 RX ADMIN — FENTANYL CITRATE 50 MCG: 50 INJECTION, SOLUTION INTRAMUSCULAR; INTRAVENOUS at 09:54

## 2018-06-29 NOTE — PROGRESS NOTES
TRANSFER - IN REPORT:    Verbal report received from [facility] on Hawa Arellano  being received from cath l;ab procedure area  for routine progression of care. Report consisted of patients Situation, Background, Assessment and Recommendations(SBAR). Information from the following report(s) Kardex and Procedure Summary was reviewed with the receiving clinician. Opportunity for questions and clarification was provided. Assessment completed upon patients arrival to [address] [place] [address] and care assumed. Cardiac Cath Lab Recovery Arrival Note:    Hawa Arellano arrived to [facility] recovery area. Patient procedure= LHC. Patient on cardiac monitor, non-invasive blood pressure, SPO2 monitor. On  O2 @ 2 lpm via NC.  IV  of NS on pump at 100 ml/hr. Patient status doing well without problems. Patient is A&Ox 3. Patient reports no pain. PROCEDURE SITE CHECK:    Procedure site:without any bleeding and no hematoma, No pain/discomfort reported at procedure site. No change in patient status. Continue to monitor patient and status.

## 2018-06-29 NOTE — IP AVS SNAPSHOT
[address] [facility] [address] 
[phone] Patient: Yulia Johnson MRN: [id number] EPK:7/92/3424 About your hospitalization You were admitted on:  June 29, 2018 You last received care in the:  [address], [facility] CATH LAB You were discharged on:  June 29, 2018 Why you were hospitalized Your primary diagnosis was:  Not on File Your diagnoses also included:  Cad (Coronary Artery Disease), Native Coronary Artery Follow-up Information Follow up With Details Comments Contact Info MD [place] [address] [address] [facility] 
[phone] Your Scheduled Appointments Thursday July 26, 2018  2:15 PM EDT ROUTINE CARE with Emily Dorsey MD  
[facility] [address] [facility] [address] [facility]  
[phone] Discharge Orders None A check sade indicates which time of day the medication should be taken. My Medications CHANGE how you take these medications Instructions Each Dose to Equal  
 Morning Noon Evening Bedtime  
 ibuprofen 800 mg tablet Commonly known as:  MOTRIN What changed:   
- how much to take - when to take this Your last dose was: Your next dose is: Take 1 Tab by mouth every eight (8) hours as needed for Pain. 800 mg CONTINUE taking these medications Instructions Each Dose to Equal  
 Morning Noon Evening Bedtime  
 aspirin delayed-release 81 mg tablet Your last dose was: Your next dose is: Take  by mouth daily. atenolol 100 mg tablet Commonly known as:  TENORMIN Your last dose was: Your next dose is: Take 1 Tab by mouth daily. 100 mg  
    
   
   
   
  
 atorvastatin 80 mg tablet Commonly known as:  LIPITOR Your last dose was: Your next dose is: Take 1 Tab by mouth daily. 80 mg CLARITIN 10 mg tablet Generic drug:  loratadine Your last dose was: Your next dose is: Take 10 mg by mouth daily as needed. 10 mg  
    
   
   
   
  
 DYMISTA 137-50 mcg/spray [place] Generic drug:  azelastine-fluticasone Your last dose was: Your next dose is: 2 Sprays by Nasal route as needed. 2 Spray FISH -160-1,000 mg Cap Generic drug:  omega 3-dha-epa-fish oil Your last dose was: Your next dose is: Take 2 Caps by mouth daily. 2 Cap  
    
   
   
   
  
 hydroCHLOROthiazide 12.5 mg tablet Commonly known as:  HYDRODIURIL Your last dose was: Your next dose is: TAKE 1 TABLET BY MOUTH EVERY DAY  
     
   
   
   
  
 levothyroxine 100 mcg tablet Commonly known as:  SYNTHROID Your last dose was: Your next dose is: TAKE 1 TABLET DAILY BEFORE BREAKFAST  
     
   
   
   
  
 magnesium oxide 400 mg tablet Commonly known as:  MAG-OX Your last dose was: Your next dose is: Take 1 Tab by mouth daily. 400 mg NexIUM 20 mg capsule Generic drug:  esomeprazole Your last dose was: Your next dose is: Take 20 mg by mouth every other day. 20 mg  
    
   
   
   
  
 nitroglycerin 0.4 mg SL tablet Commonly known as:  NITROSTAT Your last dose was: Your next dose is:    
   
   
 1 Tab by SubLINGual route every five (5) minutes as needed for Chest Pain. 0.4 mg  
    
   
   
   
  
 telmisartan 40 mg tablet Commonly known as:  Jhoana Mccurdy Your last dose was: Your next dose is: TAKE 1 TABLET DAILY Discharge Instructions You have moderate plaque build up around your vessels with calcium IN the front artery the .LAD you have a 50-60% blockage It does not need a stent Continue aggressive medical therapy, don't smoke, exercise regularly and eat a heart health diet See me in follow up as below Future Appointments Date Time Provider Felisha Espino 2018 2:15 PM Dariana Robin MD [address]  
2018 3:20 PM Carmen Richardson  [address]  Patient Discharge Instructions Odilon Harry / [de-identified] : 1950 Admitted 2018 Discharged: 2018 CARDIAC CATHETERIZATION/ CATH STENT PLACEMENT  
DISCHARGE INSTRUCTIONS If possible, have someone stay with you for the first night. It is normal to feel tired for the first couple of days. Take it easy and follow your physicians instructions on activity. CHECK THE CATHETER INSERTION SITE DAILY: If bleeding at the cath site occurs, take a clean washcloth and apply direct pressure just above the puncture site for at least 15 minutes. Call 911 immediately if the bleeding is not controlled. Continue to apply direct pressure until an ambulance gets to your location. Do not try to drive yourself or have someone else drive you to the hospital.  Have the ambulance bring you to the emergency room. You may shower 24 hours after your procedure. Gently remove the bandage during showering. Wash with soap and water and pat dry. To prevent infection, keep the groin area/insertion site clean and dry. Do not apply powders, creams, lotions, or ointments to the site for 5 days. You may cover the site with a fresh Band-Aid each day until well healed. You may notice a small lump at the site. This is normal and may last up to 6 weeks. CALL THE PHYSICIAN: 
? If the site becomes red, swollen, or feels warm to the touch, or is healing poorly ? If you note any large/extending bruise, fever >101.0 or chills ? If your extremity has numbness, tingling, discoloration, abnormal swelling, tightness or pain ? If you have difficulty with urination or develop nausea, vomiting, or severe abdominal pain ACTIVITY for the first 24-48 hours, or as instructed by your physician: 
? No lifting, pushing or pulling over 10 pounds and no straining the insertion site. Do not lift grocery bags or the garbage can; do not run the vacuum  or  for 7 days. ? You may start walking short distances the day of your procedure. Gradually increase as tolerated each day. Current activity recommendations are 30 minutes of exercise at least 5 days a week. Work up to this as you recover. ? Avoid walking outside in extremes of heat or cold. Walk inside (at home, at the mall, or at a large store) when it is cold and windy or hot and humid. THINGS TO KEEP IN MIND:  
? Do not drive, operate any machinery, or sign any legal documents for 24 hours after your procedure, or as directed by your physician. You must have someone drive you home after your procedure. ? Drink plenty of fluids for 24-48 hours after your procedure to flush the contrast dye from your kidneys. ? Limit the number of times you go up and down the stairs ? Take rests and pace yourself with activity ? Be careful and do not strain with bowel movements MEDICATIONS: 
? Take all medications as prescribed ? Call your physician if you have any questions ? Keep an updated list of your medications with you at all times and give a list to your primary physician and pharmacist 
? You may use Tylenol 325mg 1-2 tablets every 6 hours as needed for pain or discomfort, unless otherwise instructed. If you have significant discomfort more than 48 hours after your procedure, please call your physicians office.  
 
SIGNS AND SYMPTOMS: 
? Notify your physician for new or recurrent symptoms of chest discomfort, unusual shortness of breath or fatigue. These could be signs of a problem and should be discussed with your physician. ? For significant chest pain or symptoms of angina not relieved with rest:  if you have been prescribed Nitroglycerin, take as directed (taken under the tongue, one at a time 5 minutes apart for a total of 3 doses, sitting down). If the discomfort is not relieved after the 3rd Nitroglycerin, call 911. If you have not been prescribed Nitroglycerin and your chest discomfort is significant, call 911. Take the ambulance, do not try to drive yourself or have someone else drive you to the hospital.  
 
AFTER CARE: 
? Follow up with your physician as instructed ? Follow a heart healthy diet with proper portion control, daily stress management, daily exercise, blood pressure and cholesterol control, and smoking cessation. The success of your stent, if you had one placed, and the prevention of future catheterizations heavily depends on your lifestyle changes you make now! ? You may start walking short distances the day of your procedure. Gradually increase as tolerated each day. Current activity recommendations are 30 minutes of exercise at least 5 days a week. Work up to this as you recover. Walk, ride a bike, or choose any other activity you enjoy to reach this activity goal.  
? Avoid walking outside in extremes of heat or cold. Walk inside (at home, at the mall, or at a large store) when it is cold and windy or hot and humid. ? If you had a stent placed, consider Cardiac Wellness as a resource to help you make the needed lifestyle changes to live a heart healthy lifestyle. Discuss your candidacy with your physician. If you have questions, call your physicians office or the Cardiac Cath Lab at [phone]. The Cath Lab is operational from 6:30 a.m. to 5:00 p.m., Monday through Friday. After hours, notify your physician.  [address] Cardiac Wellness can be reached at [phone]. Cardiac Wellness is operational Monday-Thursday 8:30 a.m. to 5:00 p.m. and Friday 8:30 a.m. to 12:00 p.m. Remember:  IN CASE OF BLEEDING: KEEP FIRM PRESSURE ON THE PROCEDURE SITE AND CALL 911 IF NOT CONTROLLED 
 
ACO Transitions of Care Introducing [facility] [employer] offers a voluntary care coordination program to provide high quality service and care to [facility] fee-for-service beneficiaries. Juan Nix was designed to help you enhance your health and well-being through the following services: ? Transitions of Care  support for individuals who are transitioning from one care setting to another (example: Hospital to home). ? Chronic and Complex Care Coordination  support for individuals and caregivers of those with serious or chronic illnesses or with more than one chronic (ongoing) condition and those who take a number of different medications. If you meet specific medical criteria, a [facility] [address] may call you directly to coordinate your care with your primary care physician and your other care providers. For questions about the [facility] programs, please, contact your physicians office. For general questions or additional information about Accountable Care Organizations: 
Please visit www.medicare.gov/acos. html or call 1-800-MEDICARE (7-759.928.5128) TTY users should call 5-883.346.7033. Introducing [facility] & HEALTH SERVICES! Dear Donta Jara: Thank you for requesting a [employer] account. Our records indicate that you already have an active [employer] account. You can access your account anytime at [email or web address] Did you know that you can access your hospital and ER discharge instructions at any time in [employer]? You can also review all of your test results from your hospital stay or ER visit. Additional Information If you have questions, please visit the Frequently Asked Questions section of the MyChart website at [email or web address] Remember, [employer] is NOT to be used for urgent needs. For medical emergencies, dial 911. Now available from your iPhone and Android! Introducing Anthony Oliva As a Bailee Brooks patient, I wanted to make you aware of our electronic visit tool called Anthony Oliva. [employer] 24/7 allows you to connect within minutes with a medical provider 24 hours a day, seven days a week via a mobile device or tablet or logging into a secure website from your computer. You can access Anthony Oliva from anywhere in the [place]. A virtual visit might be right for you when you have a simple condition and feel like you just dont want to get out of bed, or cant get away from work for an appointment, when your regular [facility] provider is not available (evenings, weekends or holidays), or when youre out of town and need minor care. Electronic visits cost only $49 and if the Bailee YudelkaBzzAgent/7 provider determines a prescription is needed to treat your condition, one can be electronically transmitted to a nearby pharmacy*. Please take a moment to enroll today if you have not already done so. The enrollment process is free and takes just a few minutes. To enroll, please download the [employer] joleen to your tablet or phone, or visit [email or web address] to enroll on your computer. And, as an [address] [place] [address] patient with a [employer] account, the results of your visits will be scanned into your electronic medical record and your primary care provider will be able to view the scanned results. We urge you to continue to see your regular Bailee Yudelka provider for your ongoing medical care.   And while your primary care provider may not be the one available when you seek a Anthony Oliva virtual visit, the peace of mind you get from getting a real diagnosis real time can be priceless. For more information on Anthony Oliva, view our Frequently Asked Questions (FAQs) at [email or web address]. Sincerely, 
 
Kimani Gonzalez MD 
Chief Medical Officer 50Vika Gruber *:  certain medications cannot be prescribed via Anthony Oliva Providers Seen During Your Hospitalization Provider Specialty Primary office phone Cristina Matthews MD Cardiology [phone] Your Primary Care Physician (PCP) Primary Care Physician Office Phone Office Fax Laveda Alu [phone] You are allergic to the following Allergen Reactions Codeine Nausea and Vomiting Recent Documentation Height Weight Breastfeeding? BMI OB Status Smoking Status 1.676 m 66.9 kg No 23.81 kg/m2 Postmenopausal Former Smoker Emergency Contacts Name Discharge Info Relation Home Work Mobile Luis Alberto Porter DISCHARGE CAREGIVER [3] Spouse [3] [phone] Patient Belongings The following personal items are in your possession at time of discharge: 
     Visual Aid: Glasses Please provide this summary of care documentation to your next provider. Signatures-by signing, you are acknowledging that this After Visit Summary has been reviewed with you and you have received a copy. Patient Signature:  ____________________________________________________________ Date:  ____________________________________________________________  
  
Taft Sport Provider Signature:  ____________________________________________________________ Date:  ____________________________________________________________

## 2018-06-29 NOTE — PROGRESS NOTES
Cardiac Cath Lab Procedure Area Arrival Note:    Natalie Santos arrived to Cardiac Cath Lab, Procedure Area. Patient identifiers verified with NAME and DATE OF BIRTH. Procedure verified with patient. Consent forms verified. Allergies verified. Patient informed of procedure and plan of care. Questions answered with review. Patient voiced understanding of procedure and plan of care. Patient on cardiac monitor, non-invasive blood pressure, SPO2 monitor. On O2 @ 2 lpm via NC.  IV of NS on pump at 100 ml/hr. Patient status doing well without problems. Patient is A&Ox 3. Patient reports NO Pain but anxious. Patient medicated during procedure with orders obtained and verified by Dr. John Armando.    Refer to patients Cardiac Cath Lab PROCEDURE REPORT for vital signs, assessment, status, and response during procedure, printed at end of case. Printed report on chart or scanned into chart. 1054TRANSFER - OUT REPORT:    Verbal report given to Keturah Melara on Natalie Santos being transferred to  for routine progression of care       Report consisted of patients Situation, Background, Assessment and   Recommendations(SBAR). Information from the following report(s) Procedure Summary and MAR was reviewed with the receiving nurse. Opportunity for questions and clarification was provided.

## 2018-06-29 NOTE — PROCEDURES
Cardiac Catheterization Procedure Note   [facility] of [address] [place] [address] [facility] [place]. [unfilled]  Patient: Christina Dunn  MRN: [de-identified]  SSN: [id number]   YOB: 1950 Age: 78 y.o.   Sex: female    Date of Procedure: 6/29/2018   Pre-procedure Diagnosis: Coronary Artery Disease  Post-procedure Diagnosis: Coronary Artery Disease  Procedure: Left Heart Cath, LV gram and coronaries  :  Dr. Adri Kim MD    Assistant(s):  None  Anesthesia: Moderate Sedation   Estimated Blood Loss: Less than 10 mL   Specimens Removed: None  Findings:   LAD proximal hazy area at least 60% so underwent FFR and not significant enough to stent  Remained of vessels with Ca++ and moderate plaque  EF normal   Continue aggressive medical therapy and smoking cessation  Complications: None   Implants:  None  Signed by:  Adri Kim MD  6/29/2018  12:57 PM

## 2018-06-29 NOTE — IP AVS SNAPSHOT
[address] [facility] [address] 
[phone] Patient: Natalie Santos MRN: [id number] [facility]:7/69/3840 A check sade indicates which time of day the medication should be taken. My Medications CHANGE how you take these medications Instructions Each Dose to Equal  
 Morning Noon Evening Bedtime  
 ibuprofen 800 mg tablet Commonly known as:  MOTRIN What changed:   
- how much to take - when to take this Your last dose was: Your next dose is: Take 1 Tab by mouth every eight (8) hours as needed for Pain. 800 mg CONTINUE taking these medications Instructions Each Dose to Equal  
 Morning Noon Evening Bedtime  
 aspirin delayed-release 81 mg tablet Your last dose was: Your next dose is: Take  by mouth daily. atenolol 100 mg tablet Commonly known as:  TENORMIN Your last dose was: Your next dose is: Take 1 Tab by mouth daily. 100 mg  
    
   
   
   
  
 atorvastatin 80 mg tablet Commonly known as:  LIPITOR Your last dose was: Your next dose is: Take 1 Tab by mouth daily. 80 mg CLARITIN 10 mg tablet Generic drug:  loratadine Your last dose was: Your next dose is: Take 10 mg by mouth daily as needed. 10 mg  
    
   
   
   
  
 DYMISTA 137-50 mcg/spray [place] Generic drug:  azelastine-fluticasone Your last dose was: Your next dose is: 2 Sprays by Nasal route as needed. 2 Spray FISH -160-1,000 mg Cap Generic drug:  omega 3-dha-epa-fish oil Your last dose was: Your next dose is: Take 2 Caps by mouth daily. 2 Cap  
    
   
   
   
  
 hydroCHLOROthiazide 12.5 mg tablet Commonly known as:  HYDRODIURIL  
   
 Your last dose was: Your next dose is: TAKE 1 TABLET BY MOUTH EVERY DAY  
     
   
   
   
  
 levothyroxine 100 mcg tablet Commonly known as:  SYNTHROID Your last dose was: Your next dose is: TAKE 1 TABLET DAILY BEFORE BREAKFAST  
     
   
   
   
  
 magnesium oxide 400 mg tablet Commonly known as:  MAG-OX Your last dose was: Your next dose is: Take 1 Tab by mouth daily. 400 mg NexIUM 20 mg capsule Generic drug:  esomeprazole Your last dose was: Your next dose is: Take 20 mg by mouth every other day. 20 mg  
    
   
   
   
  
 nitroglycerin 0.4 mg SL tablet Commonly known as:  NITROSTAT Your last dose was: Your next dose is:    
   
   
 1 Tab by SubLINGual route every five (5) minutes as needed for Chest Pain. 0.4 mg  
    
   
   
   
  
 telmisartan 40 mg tablet Commonly known as:  Valaria Contes Your last dose was: Your next dose is: TAKE 1 TABLET DAILY

## 2018-06-29 NOTE — DISCHARGE INSTRUCTIONS
You have moderate plaque build up around your vessels with calcium  IN the front artery the .LAD you have a 50-60% blockage  It does not need a stent  Continue aggressive medical therapy, don't smoke, exercise regularly and eat a heart health diet  See me in follow up as below  Future Appointments  Date Time Provider Felisah Espino   2018 2:15 PM Trip Melton MD [address]   2018 3:20 PM Malinda Yanez  [address]          Patient Discharge Instructions    Natalie Iha / [de-identified] : 1950    Admitted 2018 Discharged: 2018         CARDIAC CATHETERIZATION/ CATH STENT PLACEMENT   DISCHARGE INSTRUCTIONS    If possible, have someone stay with you for the first night. It is normal to feel tired for the first couple of days. Take it easy and follow your physicians instructions on activity. CHECK THE CATHETER INSERTION SITE DAILY:    If bleeding at the cath site occurs, take a clean washcloth and apply direct pressure just above the puncture site for at least 15 minutes. Call 911 immediately if the bleeding is not controlled. Continue to apply direct pressure until an ambulance gets to your location. Do not try to drive yourself or have someone else drive you to the hospital.  Have the ambulance bring you to the emergency room. You may shower 24 hours after your procedure. Gently remove the bandage during showering. Wash with soap and water and pat dry. To prevent infection, keep the groin area/insertion site clean and dry. Do not apply powders, creams, lotions, or ointments to the site for 5 days. You may cover the site with a fresh Band-Aid each day until well healed. You may notice a small lump at the site. This is normal and may last up to 6 weeks. CALL THE PHYSICIAN:  ? If the site becomes red, swollen, or feels warm to the touch, or is healing poorly    ? If you note any large/extending bruise, fever >101.0 or chills  ?  If your extremity has numbness, tingling, discoloration, abnormal swelling, tightness or pain   ? If you have difficulty with urination or develop nausea, vomiting, or severe abdominal pain    ACTIVITY for the first 24-48 hours, or as instructed by your physician:  ? No lifting, pushing or pulling over 10 pounds and no straining the insertion site. Do not lift grocery bags or the garbage can; do not run the vacuum  or  for 7 days. ? You may start walking short distances the day of your procedure. Gradually increase as tolerated each day. Current activity recommendations are 30 minutes of exercise at least 5 days a week. Work up to this as you recover. ? Avoid walking outside in extremes of heat or cold. Walk inside (at home, at the mall, or at a large store) when it is cold and windy or hot and humid. THINGS TO KEEP IN MIND:   ? Do not drive, operate any machinery, or sign any legal documents for 24 hours after your procedure, or as directed by your physician. You must have someone drive you home after your procedure. ? Drink plenty of fluids for 24-48 hours after your procedure to flush the contrast dye from your kidneys. ? Limit the number of times you go up and down the stairs  ? Take rests and pace yourself with activity  ? Be careful and do not strain with bowel movements    MEDICATIONS:  ? Take all medications as prescribed  ? Call your physician if you have any questions  ? Keep an updated list of your medications with you at all times and give a list to your primary physician and pharmacist  ? You may use Tylenol 325mg 1-2 tablets every 6 hours as needed for pain or discomfort, unless otherwise instructed. If you have significant discomfort more than 48 hours after your procedure, please call your physicians office. SIGNS AND SYMPTOMS:  ? Notify your physician for new or recurrent symptoms of chest discomfort, unusual shortness of breath or fatigue.   These could be signs of a problem and should be discussed with your physician. ? For significant chest pain or symptoms of angina not relieved with rest:  if you have been prescribed Nitroglycerin, take as directed (taken under the tongue, one at a time 5 minutes apart for a total of 3 doses, sitting down). If the discomfort is not relieved after the 3rd Nitroglycerin, call 911. If you have not been prescribed Nitroglycerin and your chest discomfort is significant, call 911. Take the ambulance, do not try to drive yourself or have someone else drive you to the hospital.     AFTER CARE:  ? Follow up with your physician as instructed  ? Follow a heart healthy diet with proper portion control, daily stress management, daily exercise, blood pressure and cholesterol control, and smoking cessation. The success of your stent, if you had one placed, and the prevention of future catheterizations heavily depends on your lifestyle changes you make now! ? You may start walking short distances the day of your procedure. Gradually increase as tolerated each day. Current activity recommendations are 30 minutes of exercise at least 5 days a week. Work up to this as you recover. Walk, ride a bike, or choose any other activity you enjoy to reach this activity goal.   ? Avoid walking outside in extremes of heat or cold. Walk inside (at home, at the mall, or at a large store) when it is cold and windy or hot and humid. ? If you had a stent placed, consider [facility] as a resource to help you make the needed lifestyle changes to live a heart healthy lifestyle. Discuss your candidacy with your physician. If you have questions, call your physicians office or the Cardiac Cath Lab at [phone]. The Cath Lab is operational from 6:30 a.m. to 5:00 p.m., Monday through Friday. After hours, notify your physician. [address] [place] [address] can be reached at [phone].  [facility] is operational Monday-Thursday 8:30 a.m. to 5:00 p.m. and Friday 8:30 a.m. to 12:00 p.m.       Remember:  IN CASE OF BLEEDING: KEEP FIRM PRESSURE ON THE PROCEDURE SITE AND CALL 911 IF NOT CONTROLLED

## 2018-06-29 NOTE — ROUTINE PROCESS
Cardiac Cath Lab Recovery Arrival Note:      Jerel Abbasi arrived to Cardiac Cath Lab, Recovery Area. Staff introduced to patient. Patient identifiers verified with NAME and DATE OF BIRTH. Procedure verified with patient. Consent forms reviewed and signed by patient or authorized representative and verified. Allergies verified. Patient and family oriented to department. Patient and family informed of procedure and plan of care. Questions answered with review. Patient prepped for procedure, per orders from physician, prior to arrival.    Patient on cardiac monitor, non-invasive blood pressure, SPO2 monitor. On RA. Patient is A&Ox 4. Patient reports no pain. Patient in stretcher, in low position, with side rails up, call bell within reach, patient instructed to call if assistance as needed. Patient prep in: [address], [place]. Patient family has pager #   Family in: . Prep by: ANGELITO Gonzalez RN

## 2018-07-02 ENCOUNTER — TELEPHONE (OUTPATIENT)
Dept: CARDIOLOGY CLINIC | Age: 68
End: 2018-07-02

## 2018-07-02 NOTE — TELEPHONE ENCOUNTER
Verified patient with two types of identifiers. Patient has a few questions regarding cath. Will send Redux[employer] message to Dr. Aline Barbour regarding procedure.

## 2018-07-02 NOTE — TELEPHONE ENCOUNTER
Patient has a couple of questions regarding her heart cath. Patient would like a call back.   Phone [phone]

## 2018-07-26 ENCOUNTER — OFFICE VISIT (OUTPATIENT)
Dept: INTERNAL MEDICINE CLINIC | Age: 68
End: 2018-07-26

## 2018-07-26 VITALS
RESPIRATION RATE: 16 BRPM | TEMPERATURE: 98.2 F | DIASTOLIC BLOOD PRESSURE: 62 MMHG | SYSTOLIC BLOOD PRESSURE: 130 MMHG | WEIGHT: 146 LBS | OXYGEN SATURATION: 98 % | BODY MASS INDEX: 23.46 KG/M2 | HEART RATE: 64 BPM | HEIGHT: 66 IN

## 2018-07-26 DIAGNOSIS — E03.4 HYPOTHYROIDISM DUE TO ACQUIRED ATROPHY OF THYROID: ICD-10-CM

## 2018-07-26 DIAGNOSIS — R73.01 IFG (IMPAIRED FASTING GLUCOSE): ICD-10-CM

## 2018-07-26 DIAGNOSIS — M54.59 MECHANICAL LOW BACK PAIN: ICD-10-CM

## 2018-07-26 DIAGNOSIS — I25.10 CALCIFIC CORONARY ARTERIOSCLEROSIS: ICD-10-CM

## 2018-07-26 DIAGNOSIS — I10 HTN, GOAL BELOW 130/80: Primary | ICD-10-CM

## 2018-07-26 RX ORDER — HYDROCHLOROTHIAZIDE 25 MG/1
25 TABLET ORAL DAILY
Qty: 90 TAB | Refills: 2 | Status: SHIPPED | OUTPATIENT
Start: 2018-07-26 | End: 2019-04-17 | Stop reason: SDUPTHER

## 2018-07-26 NOTE — PROGRESS NOTES
HISTORY OF PRESENT ILLNESS  Wilner Reece is a 78 y.o. female. HPI   Dictation on: 07/26/2018  4:09 PM by: Tre De Leon       Review of Systems   Constitutional: Negative for chills, fever and weight loss. Respiratory: Negative for cough, shortness of breath and wheezing. Cardiovascular: Negative for chest pain, palpitations, orthopnea, leg swelling and PND. Gastrointestinal: Negative for abdominal pain, constipation, diarrhea, heartburn and nausea. Genitourinary: Negative for dysuria. Musculoskeletal: Positive for back pain. Negative for falls and myalgias. Neurological: Negative for dizziness and headaches. Physical Exam   Constitutional: She is oriented to person, place, and time. She appears well-developed and well-nourished. HENT:   Head: Normocephalic and atraumatic. Neck: Normal range of motion. Neck supple. Carotid bruit is not present. No thyromegaly present. Cardiovascular: Normal rate, regular rhythm, S1 normal, S2 normal, normal heart sounds and intact distal pulses. No murmur heard. Pulmonary/Chest: Effort normal and breath sounds normal. No respiratory distress. She has no wheezes. She has no rales. Musculoskeletal: She exhibits no edema. Neurological: She is alert and oriented to person, place, and time. Psychiatric: She has a normal mood and affect. Her behavior is normal.   Nursing note and vitals reviewed. ASSESSMENT and PLAN  Diagnoses and all orders for this visit:    1. HTN, goal below 130/80  -     hydroCHLOROthiazide (HYDRODIURIL) 25 mg tablet; Take 1 Tab by mouth daily. 2. Calcific coronary arteriosclerosis    3. Hypothyroidism due to acquired atrophy of thyroid    4. IFG (impaired fasting glucose)    5.  Mechanical low back pain      the following changes in treatment are made: discussed goal to get bp lower than it is will inc from 12.5 mg to 25 mg hctz and cont micardis and atenolol  See dietitian for the hgba1c of 6.4 and aptp in 3 mo with labs prior

## 2018-07-27 NOTE — PROGRESS NOTES
Issues: 
  1. Since I last saw her, she has had a cardiac catheterization 
     which fortunately showed 60% disease in LAD but no other severe 
     stenosis. She is being treated medically. She is on Lipitor 80 
     mg daily with aspirin, atenolol 100 mg and hydrochlorothiazide 12.5 daily. She notes her palpitations have essentially 
     resolved. Her blood pressure has been 130 to 140/70. She is 
     also on Micardis 40 mg. 
2.   Impaired fasting glucose with recent A1c of 6.4, not on 
medications. 3.   Ongoing lower back pain. Plans to see Dr. Jay Jay Medrano. Recent CBC, urine and chemistries were fine with BUN 15, 
creatinine 0.8. TSH 1.17, T4 at 1.39. She has quit smoking.

## 2018-07-31 ENCOUNTER — PATIENT MESSAGE (OUTPATIENT)
Dept: INTERNAL MEDICINE CLINIC | Age: 68
End: 2018-07-31

## 2018-08-01 NOTE — TELEPHONE ENCOUNTER
From: Kit Range  To: Tiffanie Malik MD  Sent: 7/31/2018 6:24 PM EDT  Subject: Non-Urgent Medical Question    Good Afternoon Dr Addy Cerna  I called [facility] today and she no longer accepts medicare or insurance. Is there someone else you could recommend . I did talk to her and her fees arn't too bad but was looking for someone medicare accepts.   Maria Isabel Prado

## 2018-08-26 DIAGNOSIS — R94.39 EQUIVOCAL STRESS TEST: ICD-10-CM

## 2018-08-26 DIAGNOSIS — E78.00 HYPERCHOLESTEROLEMIA: ICD-10-CM

## 2018-08-26 DIAGNOSIS — R93.1 AGATSTON CAC SCORE, >400: ICD-10-CM

## 2018-08-31 DIAGNOSIS — I10 ESSENTIAL HYPERTENSION: ICD-10-CM

## 2018-08-31 RX ORDER — ATENOLOL 100 MG/1
TABLET ORAL
Qty: 90 TAB | Refills: 0 | Status: SHIPPED | OUTPATIENT
Start: 2018-08-31 | End: 2018-11-30 | Stop reason: SDUPTHER

## 2018-09-25 DIAGNOSIS — I10 ESSENTIAL HYPERTENSION WITH GOAL BLOOD PRESSURE LESS THAN 130/80: ICD-10-CM

## 2018-09-25 DIAGNOSIS — E83.42 HYPOMAGNESEMIA: ICD-10-CM

## 2018-09-25 RX ORDER — LANOLIN ALCOHOL/MO/W.PET/CERES
CREAM (GRAM) TOPICAL
Qty: 90 TAB | Refills: 1 | Status: SHIPPED | OUTPATIENT
Start: 2018-09-25 | End: 2019-04-05 | Stop reason: SDUPTHER

## 2018-09-25 RX ORDER — TELMISARTAN 40 MG/1
TABLET ORAL
Qty: 90 TAB | Refills: 2 | Status: SHIPPED | OUTPATIENT
Start: 2018-09-25 | End: 2019-07-02 | Stop reason: SDUPTHER

## 2018-09-27 ENCOUNTER — TELEPHONE (OUTPATIENT)
Dept: INTERNAL MEDICINE CLINIC | Age: 68
End: 2018-09-27

## 2018-09-27 ENCOUNTER — OFFICE VISIT (OUTPATIENT)
Dept: CARDIOLOGY CLINIC | Age: 68
End: 2018-09-27

## 2018-09-27 VITALS
RESPIRATION RATE: 12 BRPM | BODY MASS INDEX: 24.33 KG/M2 | SYSTOLIC BLOOD PRESSURE: 116 MMHG | DIASTOLIC BLOOD PRESSURE: 74 MMHG | HEIGHT: 66 IN | WEIGHT: 151.4 LBS | OXYGEN SATURATION: 98 % | HEART RATE: 55 BPM

## 2018-09-27 DIAGNOSIS — I25.10 CORONARY ARTERY DISEASE INVOLVING NATIVE CORONARY ARTERY OF NATIVE HEART WITHOUT ANGINA PECTORIS: Primary | ICD-10-CM

## 2018-09-27 DIAGNOSIS — E78.00 HYPERCHOLESTEROLEMIA: ICD-10-CM

## 2018-09-27 DIAGNOSIS — R93.1 AGATSTON CAC SCORE, >400: ICD-10-CM

## 2018-09-27 DIAGNOSIS — I10 ESSENTIAL HYPERTENSION: ICD-10-CM

## 2018-09-27 RX ORDER — CHLORHEXIDINE GLUCONATE 1.2 MG/ML
RINSE ORAL
COMMUNITY
Start: 2018-07-28 | End: 2020-03-10

## 2018-09-27 NOTE — MR AVS SNAPSHOT
[address] [facility] [address] [place] [address] 
[phone] Patient: Elizabeth Dodson MRN:  [id number] Visit Information Date & Time Provider Department Dept. Phone Encounter #  
 9/27/2018  3:20 PM Rich Vivas MD [facility] [phone] [id number] Your Appointments 11/1/2018  2:15 PM  
ROUTINE CARE with Jagruti Sargent MD  
[facility] Appt Note: 3 mo [address] [facility] [address] [facility] [address] [id number]  
[phone]  
  
   
 [address] [facility] [address] Upcoming Health Maintenance Date Due Hepatitis C Screening 1950 COLONOSCOPY 9/25/1969 DTaP/Tdap/Td series (1 - Tdap) 9/25/1972 Shingrix Vaccine Age 50> (1 of 2) 9/25/2001 GLAUCOMA SCREENING Q2Y 9/25/2016 Bone Densitometry (Dexa) Screening 9/25/2016 Pneumococcal 65+ Low/Medium Risk (1 of 2 - PCV13) 9/25/2016 Influenza Age 5 to Adult 8/1/2018 MEDICARE YEARLY EXAM 12/9/2018 BREAST CANCER SCRN MAMMOGRAM 2/13/2020 Allergies as of 9/27/2018  Review Complete On: 9/27/2018 By: Rich Vivas MD  
  
 Severity Noted Reaction Type Reactions Codeine  07/28/2009    Nausea and Vomiting Current Immunizations  Reviewed on 12/8/2017 No immunizations on file. Not reviewed this visit Vitals BP Pulse Resp Height(growth percentile) Weight(growth percentile) SpO2  
 116/74 (BP 1 Location: Left arm, BP Patient Position: Sitting) (!) 55 12 5' 6\" (1.676 m) 151 lb 6.4 oz (68.7 kg) 98% BMI OB Status Smoking Status 24.44 kg/m2 Postmenopausal Former Smoker Vitals History BMI and BSA Data Body Mass Index Body Surface Area  
 24.44 kg/m 2 1.79 m 2 Preferred Pharmacy Pharmacy Name Phone [id number] [address], [address] [facility] [phone] Your Updated Medication List  
  
   
This list is accurate as of 9/27/18  3:55 PM.  Always use your most recent med list.  
  
  
  
  
 aspirin delayed-release 81 mg tablet Take  by mouth daily. atenolol 100 mg tablet Commonly known as:  TENORMIN  
TAKE 1 TABLET BY MOUTH EVERY DAY  
  
 atorvastatin 80 mg tablet Commonly known as:  LIPITOR Take 1 Tab by mouth daily. chlorhexidine 0.12 % solution Commonly known as:  PERIDEX CLARITIN 10 mg tablet Generic drug:  loratadine Take 10 mg by mouth daily as needed. DYMISTA 137-50 mcg/spray [place] Generic drug:  azelastine-fluticasone 2 Sprays by Nasal route as needed. FISH -160-1,000 mg Cap Generic drug:  omega 3-dha-epa-fish oil Take 2 Caps by mouth daily. hydroCHLOROthiazide 25 mg tablet Commonly known as:  HYDRODIURIL Take 1 Tab by mouth daily. ibuprofen 800 mg tablet Commonly known as:  MOTRIN Take 1 Tab by mouth every eight (8) hours as needed for Pain.  
  
 levothyroxine 100 mcg tablet Commonly known as:  SYNTHROID  
TAKE 1 TABLET DAILY BEFORE BREAKFAST  
  
 magnesium oxide 400 mg (241.3 mg magnesium) tablet Commonly known as:  MAG-OX  
TAKE 1 TAB BY MOUTH DAILY. NexIUM 20 mg capsule Generic drug:  esomeprazole Take 20 mg by mouth every other day. nitroglycerin 0.4 mg SL tablet Commonly known as:  NITROSTAT  
1 Tab by SubLINGual route every five (5) minutes as needed for Chest Pain. telmisartan 40 mg tablet Commonly known as:  MICARDIS  
TAKE 1 TABLET DAILY Patient Instructions Follow up with  in 6 months. Introducing [facility]! Dear Prosper Mishra: Thank you for requesting a [employer] account. Our records indicate that you already have an active [employer] account. You can access your account anytime at [email or web address]. [employer] Did you know that you can access your hospital and ER discharge instructions at any time in [employer]? You can also review all of your test results from your hospital stay or ER visit. Additional Information If you have questions, please visit the Frequently Asked Questions section of the [employer] website at [email or web address] Remember, [employer] is NOT to be used for urgent needs. For medical emergencies, dial 911. Now available from your iPhone and Android! Please provide this summary of care documentation to your next provider. Your primary care clinician is listed as Nomi 68. If you have any questions after today's visit, please call [phone].

## 2018-09-27 NOTE — PROGRESS NOTES
Ravi Arias     1950       Lior Mccormick MD, [facility] Date of Visit-9/27/2018 PCP is Jeremy Sebastian MD  
[facility] [place] [facility] HPI:  Ravi Arias is a 80 y.o. female Cath 6/29/18  LAD proximal 60%. FFR not significant. Follow up of CAD. Overall the pt states she is doing well. Pt has quit smoking since last October. Pt is still on atenolol and hydrochlorothiazide and aspirin. Last LDL was 72. Pt broke her toes, but would like to do more physical activities when she recovers. Pt does not get SOB or tired, but she would get left arm pain when doing line dancing. Pt feels fine otherwise and denies no heart-related issues since last visit. Denies chest pain, edema, syncope or shortness of breath at rest, has no tachycardia, palpitations or sense of arrhythmia. Assessment/Plan:   The primary encounter diagnosis was Coronary artery disease involving native coronary artery of native heart without angina pectoris. Diagnoses of Agatston CAC score, >400, Essential hypertension, and Hypercholesterolemia were also pertinent to this visit. 1. CAD. - Very stable angina. Went dancing only at high exertion. Has not changed. Stopped smoking. Taking high potency statin and aspirin.   
- LDL is at goal. AT  72, HDL 52 Follow up in 6 months. Cardiac History:  
CT coronary arteries 05/22/18 - left main ostial heavy calcification and left main distally. LAD heavy calcification proximal distal portion and heavy calcification D1. Left circumflex heavy calcified plaque in the proximal portion. RCA heavy calcified plaque of mid and distal RCA. Calcium score 03/29/18 showed calcium score of 2,039, large amount of calcium in LAD and RCA. Carotid dopplers 02/13/18 showed less than 50% stenosis of right internal and less than 50% of the left internal carotid arteries. CT scan 2015 showed calcium in the left main. CT done for pulmonary nodule previously. Stress echo 04/11/18, Dr. Albert Person - EKG changes, non diagnostic, walked 5 minutes, no echo changes. ROS-except as noted above. . A complete cardiac and respiratory are reviewed and negative except as above ; Resp-denies wheezing  or productive cough,. Const- No unusual weight loss or fever; Neuro-no recent seizure or CVA ; GI- No BRBPR, abdom pain, bloating ; - no  hematuria  
see supplement sheet, initialed and to be scanned by staff Past Medical History:  
Diagnosis Date  Agatston CAC score, >400 6/4/2018  CAD (coronary artery disease), native coronary artery 06/29/2018  
 cath 6-29-18 LAD prox 50-60% lesion, FFR neg  Calcific coronary arteriosclerosis 6/4/2018  Diverticulitis 7/28/2009  Ex-smoker 6/4/2018  
 HTN 7/28/2009  Hypercholesterolemia 7/28/2009  Hypothyroid 7/28/2009 Social Hx= reports that she has quit smoking. Her smoking use included Cigarettes. She has a 0.50 pack-year smoking history. She has never used smokeless tobacco. She reports that she drinks about 2.0 oz of alcohol per week  She reports that she does not use illicit drugs. Exam and Labs:   
Visit Vitals  /74 (BP 1 Location: Left arm, BP Patient Position: Sitting)  Pulse (!) 55  Resp 12  Ht 5' 6\" (1.676 m)  Wt 151 lb 6.4 oz (68.7 kg)  SpO2 98%  BMI 24.44 kg/m2 Constitutional:  NAD, comfortable Head: NC,AT. Eyes: No scleral icterus. Neck:  Neck supple. No JVD present. Throat: moist mucous membranes. Chest: Effort normal & normal respiratory excursion . Neurological: alert, conversant and oriented . Skin: Skin is not cold. No obvious systemic rash noted. Not diaphoretic. No erythema. Psychiatric:  Grossly normal mood and affect. Behavior appears normal. Extremities:  no clubbing or cyanosis. Abdomen: non distended Lungs:breath sounds normal. No stridor. distress, wheezes or  Rales. Heart: normal rate, regular rhythm, normal S1, S2, no murmurs, rubs, clicks or gallops , PMI non displaced. Edema: Edema is none. Lab Results Component Value Date/Time Cholesterol, total 177 03/27/2012 11:19 AM  
 HDL Cholesterol 59 10/06/2014 HDL Cholesterol 64 03/27/2012 11:19 AM  
 LDL Cholesterol 95 10/06/2014 LDL, calculated 76 03/27/2012 11:19 AM  
 Triglyceride 183 (H) 03/27/2012 11:19 AM  
 Triglycerides 227 10/06/2014 CHOL/HDL Ratio 3.37 10/06/2014 Lab Results Component Value Date/Time Sodium 135 03/27/2012 11:19 AM  
 Potassium 4.0 03/27/2012 11:19 AM  
 Chloride 100 03/27/2012 11:19 AM  
 CO2 22 03/27/2012 11:19 AM  
 Glucose 94 03/27/2012 11:19 AM  
 BUN 13 03/27/2012 11:19 AM  
 Creatinine 0.77 03/27/2012 11:19 AM  
 BUN/Creatinine ratio 17 03/27/2012 11:19 AM  
 GFR est AA 97 03/27/2012 11:19 AM  
 GFR est non-AA 84 03/27/2012 11:19 AM  
 Calcium 9.2 03/27/2012 11:19 AM  
  
Wt Readings from Last 3 Encounters:  
09/27/18 151 lb 6.4 oz (68.7 kg) 07/26/18 146 lb (66.2 kg) 06/29/18 147 lb 8 oz (66.9 kg) BP Readings from Last 3 Encounters:  
09/27/18 116/74  
07/26/18 130/62  
06/29/18 118/43 Current Outpatient Prescriptions Medication Sig  chlorhexidine (PERIDEX) 0.12 % solution  magnesium oxide (MAG-OX) 400 mg tablet TAKE 1 TAB BY MOUTH DAILY.  telmisartan (MICARDIS) 40 mg tablet TAKE 1 TABLET DAILY  atenolol (TENORMIN) 100 mg tablet TAKE 1 TABLET BY MOUTH EVERY DAY  hydroCHLOROthiazide (HYDRODIURIL) 25 mg tablet Take 1 Tab by mouth daily.  levothyroxine (SYNTHROID) 100 mcg tablet TAKE 1 TABLET DAILY BEFORE BREAKFAST  nitroglycerin (NITROSTAT) 0.4 mg SL tablet 1 Tab by SubLINGual route every five (5) minutes as needed for Chest Pain.  esomeprazole (NEXIUM) 20 mg capsule Take 20 mg by mouth every other day.  atorvastatin (LIPITOR) 80 mg tablet Take 1 Tab by mouth daily.  omega 3-dha-epa-fish oil (FISH OIL) 100-160-1,000 mg cap Take 2 Caps by mouth daily.  DYMISTA 137-50 mcg/spray spry 2 Sprays by Nasal route as needed.  ibuprofen (MOTRIN) 800 mg tablet Take 1 Tab by mouth every eight (8) hours as needed for Pain. (Patient taking differently: Take 400 mg by mouth as needed for Pain.)  loratadine (CLARITIN) 10 mg tablet Take 10 mg by mouth daily as needed.  aspirin delayed-release 81 mg tablet Take  by mouth daily. No current facility-administered medications for this visit. Impression see above.   
  
Written by Jose Carlos Zaragoza, as dictated by Ravi Ivan MD.

## 2018-09-27 NOTE — TELEPHONE ENCOUNTER
Patient request a return call regarding confirmation of receipt of her blood work that was faxed over today 09/27 Patient best contact [phone]

## 2018-09-27 NOTE — PROGRESS NOTES
1. Have you been to the ER, urgent care clinic since your last visit? Hospitalized since your last visit? [facility] 9/2018 for Broken Toe. 
 
2. Have you seen or consulted any other health care providers outside of the [address] [place] [address] since your last visit? Include any pap smears or colon screening. No  
 
Chief Complaint Patient presents with  Results 81 y/o female reports to office for follow up to discuss labs and treadmill. Visit Vitals  /74 (BP 1 Location: Left arm, BP Patient Position: Sitting)  Pulse (!) 55  Resp 12  Ht 5' 6\" (1.676 m)  Wt 151 lb 6.4 oz (68.7 kg)  SpO2 98%  BMI 24.44 kg/m2

## 2018-09-27 NOTE — TELEPHONE ENCOUNTER
Notified patient her labs that were faxed were received & waiting for PCP to review. Patient expressed concerns about her elevated a1c reading of 6.5 & her actual glucose reading which was in the 80s. Explained the a1c is an average reading of her glucose & gives more information in the setting of diabetes vs the regular glucose reading gives the glucose at that exact moment. Advised will have PCP review the results. Patient voiced understanding.

## 2018-11-01 ENCOUNTER — OFFICE VISIT (OUTPATIENT)
Dept: INTERNAL MEDICINE CLINIC | Age: 68
End: 2018-11-01

## 2018-11-01 VITALS
HEIGHT: 66 IN | DIASTOLIC BLOOD PRESSURE: 66 MMHG | RESPIRATION RATE: 18 BRPM | SYSTOLIC BLOOD PRESSURE: 136 MMHG | WEIGHT: 149.4 LBS | BODY MASS INDEX: 24.01 KG/M2 | HEART RATE: 61 BPM | TEMPERATURE: 98.4 F | OXYGEN SATURATION: 96 %

## 2018-11-01 DIAGNOSIS — E03.4 HYPOTHYROIDISM DUE TO ACQUIRED ATROPHY OF THYROID: ICD-10-CM

## 2018-11-01 DIAGNOSIS — I10 HTN, GOAL BELOW 130/80: Primary | ICD-10-CM

## 2018-11-01 DIAGNOSIS — I25.10 CALCIFIC CORONARY ARTERIOSCLEROSIS: ICD-10-CM

## 2018-11-01 DIAGNOSIS — E78.00 HYPERCHOLESTEROLEMIA: ICD-10-CM

## 2018-11-01 DIAGNOSIS — M54.50 CHRONIC RIGHT-SIDED LOW BACK PAIN WITHOUT SCIATICA: ICD-10-CM

## 2018-11-01 DIAGNOSIS — K21.9 GERD WITHOUT ESOPHAGITIS: ICD-10-CM

## 2018-11-01 DIAGNOSIS — G89.29 CHRONIC RIGHT-SIDED LOW BACK PAIN WITHOUT SCIATICA: ICD-10-CM

## 2018-11-01 NOTE — PROGRESS NOTES
Sadia Landin is a 80 y.o. female Patient concern with taking nexium and liptor. Chronic back pain, went to P.T. But it feels as tho it's getting worst instead of better with P.T. Chief Complaint Patient presents with  Hypertension  
  follow up 1. Have you been to the ER, urgent care clinic since your last visit? Hospitalized since your last visit? Left pinky toe, [facility] ER 09/2018 
M 
2. Have you seen or consulted any other health care providers outside of the [address] [place] [address] since your last visit? Include any pap smears or colon screening. No 
 
 
Visit Vitals /66 (BP 1 Location: Left arm, BP Patient Position: Sitting) Pulse 61 Temp 98.4 °F (36.9 °C) (Oral) Resp 18 Ht 5' 6\" (1.676 m) Wt 149 lb 6.4 oz (67.8 kg) SpO2 96% BMI 24.11 kg/m² Health Maintenance Due Topic Date Due  
 Hepatitis C Screening  1950  COLONOSCOPY  09/25/1969  
 DTaP/Tdap/Td series (1 - Tdap) 09/25/1972  Shingrix Vaccine Age 50> (1 of 2) 09/25/2001  GLAUCOMA SCREENING Q2Y  09/25/2016  Bone Densitometry (Dexa) Screening  09/25/2016  Pneumococcal 65+ Low/Medium Risk (1 of 2 - PCV13) 09/25/2016  Influenza Age 5 to Adult  08/01/2018

## 2018-11-01 NOTE — PROGRESS NOTES
HISTORY OF PRESENT ILLNESS Gabriele Argueta is a 80 y.o. female. HPI Subjective:  
Gabriele Argueta is a 80 y.o. female with hypertension. Hypertension ROS: taking medications as instructed, no medication side effects noted, no TIA's, no chest pain on exertion, no dyspnea on exertion, no swelling of ankles. New concerns: she has ongoing discomfort in buttock and low back intermittently for months-pt and flexeril and motrin not helping=worse after sitting at work for hours no weakness in leg Also has ? Re safety of nexium which has resolved her gerd sxs totally-still uses nsaids prn Did stop smoking and has not resumed. Labs from work including cmp and tsh and lipids are good and reviewed in office with her. Review of Systems Constitutional: Negative for chills, fever and weight loss. Respiratory: Negative for cough, shortness of breath and wheezing. Cardiovascular: Negative for chest pain, palpitations, orthopnea, leg swelling and PND. Gastrointestinal: Negative for abdominal pain, constipation, heartburn and nausea. Musculoskeletal: Positive for back pain. Negative for myalgias. Neurological: Negative for dizziness, sensory change, focal weakness and headaches. Physical Exam  
Constitutional: She is oriented to person, place, and time. She appears well-developed and well-nourished. HENT:  
Head: Normocephalic and atraumatic. Neck: Normal range of motion. Neck supple. Carotid bruit is not present. No thyromegaly present. Cardiovascular: Normal rate, regular rhythm, S1 normal, S2 normal, normal heart sounds and intact distal pulses. No murmur heard. Pulmonary/Chest: Effort normal and breath sounds normal. No respiratory distress. She has no wheezes. She has no rales. Musculoskeletal: She exhibits no edema. Neurological: She is alert and oriented to person, place, and time. Psychiatric: She has a normal mood and affect.  Her behavior is normal.  
 Nursing note and vitals reviewed. ASSESSMENT and PLAN Diagnoses and all orders for this visit: 1. HTN, goal below 130/80-cont meds 2. Calcific coronary arteriosclerosis-cont statin and cont exercise 3. Hypothyroidism due to acquired atrophy of thyroid-tsh good on labs recently drawn 4. Hypercholesterolemia-cont lipitor 5. Chronic right-sided low back pain without sciatica-referto dr Monica Romo 6. GERD without esophagitis-cont nexium

## 2018-11-30 DIAGNOSIS — I10 ESSENTIAL HYPERTENSION: ICD-10-CM

## 2018-11-30 RX ORDER — ATENOLOL 100 MG/1
TABLET ORAL
Qty: 90 TAB | Refills: 2 | Status: SHIPPED | OUTPATIENT
Start: 2018-11-30 | End: 2019-08-26 | Stop reason: SDUPTHER

## 2018-12-29 DIAGNOSIS — E03.4 HYPOTHYROIDISM DUE TO ACQUIRED ATROPHY OF THYROID: ICD-10-CM

## 2018-12-30 RX ORDER — LEVOTHYROXINE SODIUM 100 UG/1
TABLET ORAL
Qty: 90 TAB | Refills: 1 | Status: SHIPPED | OUTPATIENT
Start: 2018-12-30 | End: 2019-07-02 | Stop reason: SDUPTHER

## 2019-03-19 ENCOUNTER — OFFICE VISIT (OUTPATIENT)
Dept: CARDIOLOGY CLINIC | Age: 69
End: 2019-03-19

## 2019-03-19 VITALS
SYSTOLIC BLOOD PRESSURE: 140 MMHG | BODY MASS INDEX: 23.75 KG/M2 | HEART RATE: 64 BPM | DIASTOLIC BLOOD PRESSURE: 82 MMHG | HEIGHT: 66 IN | OXYGEN SATURATION: 99 % | RESPIRATION RATE: 16 BRPM | WEIGHT: 147.8 LBS

## 2019-03-19 DIAGNOSIS — R93.1 AGATSTON CAC SCORE, >400: ICD-10-CM

## 2019-03-19 DIAGNOSIS — I10 ESSENTIAL HYPERTENSION: ICD-10-CM

## 2019-03-19 DIAGNOSIS — I25.10 CORONARY ARTERY DISEASE INVOLVING NATIVE CORONARY ARTERY OF NATIVE HEART WITHOUT ANGINA PECTORIS: Primary | ICD-10-CM

## 2019-03-19 DIAGNOSIS — E78.00 HYPERCHOLESTEROLEMIA: ICD-10-CM

## 2019-03-19 NOTE — PROGRESS NOTES
Avery Weller     1950       Lior Hutchison MD, [facility] [place] Date of Visit-3/19/2019 PCP is Royal Winnie MD  
[facility] HPI:  Avery Weller is a 80 y.o. female Cath 6/29/18  LAD proximal 60%. FFR not significant. Follow up of CAD. Overall the pt states she is doing well. Pt has quit smoking since last October. Last LDL was 72. Denies chest pain, edema, syncope or shortness of breath at rest  
Has no tachycardia , palpitations or sense of arrythmia Assessment/Plan: 1. CAD. native 
- no angina. Stopped smoking. Taking high potency statin and aspirin.   
- LDL is at goal. AT  72, HDL 52 Follow up in 12 months 2. Lipids on high potency statin as appropriate for secondary prevention. Consider change to Crestor if LDL >65 
3. HTN at goal 
BP Readings from Last 3 Encounters:  
03/19/19 140/82  
11/01/18 136/66  
09/27/18 116/74 1. Coronary artery disease involving native coronary artery of native heart without angina pectoris 2. Agatston CAC score, >400 3. Essential hypertension 4. Hypercholesterolemia Cardiac History:  
CT coronary arteries 05/22/18 - left main ostial heavy calcification and left main distally. LAD heavy calcification proximal distal portion and heavy calcification D1. Left circumflex heavy calcified plaque in the proximal portion. RCA heavy calcified plaque of mid and distal RCA. Calcium score 03/29/18 showed calcium score of 2,039, large amount of calcium in LAD and RCA. Carotid dopplers 02/13/18 showed less than 50% stenosis of right internal and less than 50% of the left internal carotid arteries. CT scan 2015 showed calcium in the left main. CT done for pulmonary nodule previously. Stress echo 04/11/18, Dr. Padilla Europe - EKG changes, non diagnostic, walked 5 minutes, no echo changes. ROS-except as noted above. . A complete cardiac and respiratory are reviewed and negative except as above ; Resp-denies wheezing  or productive cough,. Const- No unusual weight loss or fever; Neuro-no recent seizure or CVA ; GI- No BRBPR, abdom pain, bloating ; - no  hematuria Past Medical History:  
Diagnosis Date  Agatston CAC score, >400 6/4/2018  CAD (coronary artery disease), native coronary artery 06/29/2018  
 cath 6-29-18 LAD prox 50-60% lesion, FFR neg  Calcific coronary arteriosclerosis 6/4/2018  Diverticulitis 7/28/2009  Ex-smoker 6/4/2018  
 HTN 7/28/2009  Hypercholesterolemia 7/28/2009  Hypothyroid 7/28/2009 Social Hx= reports that she has quit smoking. Her smoking use included cigarettes. She has a 0.50 pack-year smoking history. she has never used smokeless tobacco. She reports that she drinks about 2.0 oz of alcohol per week. She reports that she does not use drugs. Exam and Labs:   
Visit Vitals /82 (BP 1 Location: Right arm, BP Patient Position: Sitting) Pulse 64 Resp 16 Ht 5' 6\" (1.676 m) Wt 147 lb 12.8 oz (67 kg) SpO2 99% BMI 23.86 kg/m² Constitutional:  NAD, comfortable Head: NC,AT. Eyes: No scleral icterus. Neck:  Neck supple. No JVD present. Throat: moist mucous membranes. Chest: Effort normal & normal respiratory excursion . Neurological: alert, conversant and oriented . Skin: Skin is not cold. No obvious systemic rash noted. Not diaphoretic. No erythema. Psychiatric:  Grossly normal mood and affect. Behavior appears normal. Extremities:  no clubbing or cyanosis. Abdomen: non distended Lungs:breath sounds normal. No stridor. distress, wheezes or  Rales. Heart: normal rate, regular rhythm, normal S1, S2, no murmurs, rubs, clicks or gallops , PMI non displaced. Edema: Edema is none. Wt Readings from Last 3 Encounters:  
03/19/19 147 lb 12.8 oz (67 kg) 11/01/18 149 lb 6.4 oz (67.8 kg) 09/27/18 151 lb 6.4 oz (68.7 kg) BP Readings from Last 3 Encounters:  
03/19/19 140/82  
11/01/18 136/66  
09/27/18 116/74 Current Outpatient Medications Medication Sig  epigallocatechin gallate (GREEN TEA EXTRACT) by Does Not Apply route.  levothyroxine (SYNTHROID) 100 mcg tablet TAKE 1 TABLET DAILY BEFORE BREAKFAST  atenolol (TENORMIN) 100 mg tablet TAKE 1 TABLET BY MOUTH EVERY DAY  chlorhexidine (PERIDEX) 0.12 % solution  magnesium oxide (MAG-OX) 400 mg tablet TAKE 1 TAB BY MOUTH DAILY.  telmisartan (MICARDIS) 40 mg tablet TAKE 1 TABLET DAILY  hydroCHLOROthiazide (HYDRODIURIL) 25 mg tablet Take 1 Tab by mouth daily.  nitroglycerin (NITROSTAT) 0.4 mg SL tablet 1 Tab by SubLINGual route every five (5) minutes as needed for Chest Pain.  esomeprazole (NEXIUM) 20 mg capsule Take 20 mg by mouth every other day.  atorvastatin (LIPITOR) 80 mg tablet Take 1 Tab by mouth daily.  omega 3-dha-epa-fish oil (FISH OIL) 100-160-1,000 mg cap Take 2 Caps by mouth daily.  DYMISTA 137-50 mcg/spray spry 2 Sprays by Nasal route as needed.  ibuprofen (MOTRIN) 800 mg tablet Take 1 Tab by mouth every eight (8) hours as needed for Pain. (Patient taking differently: Take 400 mg by mouth as needed for Pain.)  loratadine (CLARITIN) 10 mg tablet Take 10 mg by mouth daily as needed.  aspirin delayed-release 81 mg tablet Take  by mouth daily. No current facility-administered medications for this visit. Impression see above.

## 2019-03-19 NOTE — PROGRESS NOTES
Chief Complaint Patient presents with  Follow-up 1. Have you been to the ER, urgent care clinic since your last visit? Hospitalized since your last visit? No 
 
2. Have you seen or consulted any other health care providers outside of the [address] [place] [address] since your last visit? Include any pap smears or colon screening. No 
 
4) Are you interested in receiving information on Advance Directives? NO Patient is accompanied by self I have received verbal consent from Adriel Lopez to discuss any/all medical information while they are present in the room.

## 2019-04-04 ENCOUNTER — TELEPHONE (OUTPATIENT)
Dept: INTERNAL MEDICINE CLINIC | Age: 69
End: 2019-04-04

## 2019-04-04 DIAGNOSIS — E78.00 HYPERCHOLESTEROLEMIA: ICD-10-CM

## 2019-04-04 NOTE — TELEPHONE ENCOUNTER
Patient is faxing over copy of lab work to [phone]. Stated she would like a call back to discuss results. Thanks.     Best contact: [phone]

## 2019-04-05 DIAGNOSIS — E83.42 HYPOMAGNESEMIA: ICD-10-CM

## 2019-04-05 RX ORDER — LANOLIN ALCOHOL/MO/W.PET/CERES
CREAM (GRAM) TOPICAL
Qty: 90 TAB | Refills: 1 | Status: SHIPPED | OUTPATIENT
Start: 2019-04-05 | End: 2019-07-02 | Stop reason: SDUPTHER

## 2019-04-09 RX ORDER — ATORVASTATIN CALCIUM 80 MG/1
80 TABLET, FILM COATED ORAL DAILY
Qty: 90 TAB | Refills: 2 | Status: SHIPPED | OUTPATIENT
Start: 2019-04-09 | End: 2019-05-01 | Stop reason: ALTCHOICE

## 2019-04-09 NOTE — TELEPHONE ENCOUNTER
Patient notified lab results have been received but PCP is currently out of office. Explained will get PCP to review her lab results once she returns back to the office. Patient voiced understanding. Lab results placed on PCP's desk for review. Patient also requested a refill for her Lipitor to pharmacy on file. Refill sent.

## 2019-04-17 DIAGNOSIS — I10 HTN, GOAL BELOW 130/80: ICD-10-CM

## 2019-04-18 RX ORDER — HYDROCHLOROTHIAZIDE 25 MG/1
TABLET ORAL
Qty: 90 TAB | Refills: 2 | Status: SHIPPED | OUTPATIENT
Start: 2019-04-18 | End: 2020-01-17 | Stop reason: SDUPTHER

## 2019-05-01 ENCOUNTER — OFFICE VISIT (OUTPATIENT)
Dept: INTERNAL MEDICINE CLINIC | Age: 69
End: 2019-05-01

## 2019-05-01 VITALS
BODY MASS INDEX: 23.95 KG/M2 | OXYGEN SATURATION: 98 % | DIASTOLIC BLOOD PRESSURE: 72 MMHG | TEMPERATURE: 98.8 F | WEIGHT: 149 LBS | HEART RATE: 64 BPM | SYSTOLIC BLOOD PRESSURE: 124 MMHG | HEIGHT: 66 IN | RESPIRATION RATE: 16 BRPM

## 2019-05-01 DIAGNOSIS — I25.10 CALCIFIC CORONARY ARTERIOSCLEROSIS: ICD-10-CM

## 2019-05-01 DIAGNOSIS — E78.5 DYSLIPIDEMIA, GOAL LDL BELOW 70: Primary | ICD-10-CM

## 2019-05-01 DIAGNOSIS — I10 HTN, GOAL BELOW 130/80: ICD-10-CM

## 2019-05-01 RX ORDER — ROSUVASTATIN CALCIUM 20 MG/1
20 TABLET, COATED ORAL
Qty: 30 TAB | Refills: 5 | Status: SHIPPED | OUTPATIENT
Start: 2019-05-01 | End: 2019-09-10 | Stop reason: SDUPTHER

## 2019-05-01 NOTE — PROGRESS NOTES
HISTORY OF PRESENT ILLNESS  Zuleyma Rader is a 80 y.o. female. HPI  Comes in for follow up on hypertension, dyslipidemia and coronary disease. She had recent labs on Lipitor 80, cholesterol 160, triglycerides 122, HDL 60, LDL 76. We have discussed goal of LDL below 70 and will switch to Crestor 20 mg. Discussed side effects. Thyroid, stable with TSH of 1.19 on current dose. Hypertension. Blood pressure at home is in the 120s/70s on HCTZ, Micardis and Atenolol. Will not make any changes. Discussed exercise goals at least four days a week. Review of Systems   Constitutional: Negative for chills, fever and weight loss. Respiratory: Negative for cough, shortness of breath and wheezing. Cardiovascular: Negative for chest pain, palpitations, orthopnea, leg swelling and PND. Gastrointestinal: Negative for heartburn and nausea. Musculoskeletal: Negative for myalgias. Neurological: Negative for dizziness and headaches. Physical Exam   Constitutional: She is oriented to person, place, and time. She appears well-developed and well-nourished. HENT:   Head: Normocephalic and atraumatic. Neck: Normal range of motion. Neck supple. Carotid bruit is not present. No thyromegaly present. Cardiovascular: Normal rate, regular rhythm, S1 normal, S2 normal, normal heart sounds and intact distal pulses. No murmur heard. Pulmonary/Chest: Effort normal and breath sounds normal. No respiratory distress. She has no wheezes. She has no rales. Musculoskeletal: She exhibits no edema. Neurological: She is alert and oriented to person, place, and time. Psychiatric: She has a normal mood and affect. Her behavior is normal.   Nursing note and vitals reviewed. ASSESSMENT and PLAN  Diagnoses and all orders for this visit:    1. Dyslipidemia, goal LDL below 70  -     rosuvastatin (CRESTOR) 20 mg tablet; Take 1 Tab by mouth nightly. 2. HTN, goal below 130/80    3.  Calcific coronary arteriosclerosis      the following changes in treatment are made: change to crestor  Cont other meds  Inc exercise  appt in 4mo

## 2019-07-01 DIAGNOSIS — E83.42 HYPOMAGNESEMIA: ICD-10-CM

## 2019-07-01 DIAGNOSIS — E03.4 HYPOTHYROIDISM DUE TO ACQUIRED ATROPHY OF THYROID: ICD-10-CM

## 2019-07-01 DIAGNOSIS — I10 ESSENTIAL HYPERTENSION WITH GOAL BLOOD PRESSURE LESS THAN 130/80: ICD-10-CM

## 2019-07-01 RX ORDER — TELMISARTAN 40 MG/1
TABLET ORAL
Qty: 90 TAB | Refills: 2 | Status: CANCELLED | OUTPATIENT
Start: 2019-07-01

## 2019-07-01 RX ORDER — LEVOTHYROXINE SODIUM 100 UG/1
TABLET ORAL
Qty: 90 TAB | Refills: 1 | Status: CANCELLED | OUTPATIENT
Start: 2019-07-01

## 2019-07-01 RX ORDER — LANOLIN ALCOHOL/MO/W.PET/CERES
CREAM (GRAM) TOPICAL
Qty: 90 TAB | Refills: 1 | Status: CANCELLED | OUTPATIENT
Start: 2019-07-01

## 2019-07-02 DIAGNOSIS — I10 ESSENTIAL HYPERTENSION WITH GOAL BLOOD PRESSURE LESS THAN 130/80: ICD-10-CM

## 2019-07-02 DIAGNOSIS — E03.4 HYPOTHYROIDISM DUE TO ACQUIRED ATROPHY OF THYROID: ICD-10-CM

## 2019-07-02 RX ORDER — LANOLIN ALCOHOL/MO/W.PET/CERES
CREAM (GRAM) TOPICAL
Qty: 90 TAB | Refills: 1 | Status: SHIPPED | OUTPATIENT
Start: 2019-07-02 | End: 2020-03-20 | Stop reason: SDUPTHER

## 2019-07-02 RX ORDER — LEVOTHYROXINE SODIUM 100 UG/1
TABLET ORAL
Qty: 90 TAB | Refills: 1 | Status: SHIPPED | OUTPATIENT
Start: 2019-07-02 | End: 2019-12-23

## 2019-07-02 RX ORDER — TELMISARTAN 40 MG/1
TABLET ORAL
Qty: 90 TAB | Refills: 2 | Status: SHIPPED | OUTPATIENT
Start: 2019-07-02 | End: 2020-03-31

## 2019-08-26 DIAGNOSIS — I10 ESSENTIAL HYPERTENSION: ICD-10-CM

## 2019-08-26 RX ORDER — ATENOLOL 100 MG/1
TABLET ORAL
Qty: 90 TAB | Refills: 2 | Status: SHIPPED | OUTPATIENT
Start: 2019-08-26 | End: 2020-05-28 | Stop reason: SDUPTHER

## 2019-09-10 DIAGNOSIS — E78.5 DYSLIPIDEMIA, GOAL LDL BELOW 70: ICD-10-CM

## 2019-09-10 RX ORDER — ROSUVASTATIN CALCIUM 20 MG/1
TABLET, COATED ORAL
Qty: 90 TAB | Refills: 1 | Status: SHIPPED | OUTPATIENT
Start: 2019-09-10 | End: 2019-09-17 | Stop reason: DRUGHIGH

## 2019-09-17 ENCOUNTER — OFFICE VISIT (OUTPATIENT)
Dept: INTERNAL MEDICINE CLINIC | Age: 69
End: 2019-09-17

## 2019-09-17 VITALS
SYSTOLIC BLOOD PRESSURE: 126 MMHG | DIASTOLIC BLOOD PRESSURE: 71 MMHG | WEIGHT: 149 LBS | HEIGHT: 66 IN | OXYGEN SATURATION: 98 % | BODY MASS INDEX: 23.95 KG/M2 | RESPIRATION RATE: 16 BRPM | HEART RATE: 66 BPM | TEMPERATURE: 98.8 F

## 2019-09-17 DIAGNOSIS — I10 HTN, GOAL BELOW 130/80: ICD-10-CM

## 2019-09-17 DIAGNOSIS — I25.10 CALCIFIC CORONARY ARTERIOSCLEROSIS: ICD-10-CM

## 2019-09-17 DIAGNOSIS — E78.5 DYSLIPIDEMIA, GOAL LDL BELOW 70: Primary | ICD-10-CM

## 2019-09-17 DIAGNOSIS — R73.01 IFG (IMPAIRED FASTING GLUCOSE): ICD-10-CM

## 2019-09-17 RX ORDER — ROSUVASTATIN CALCIUM 40 MG/1
40 TABLET, COATED ORAL
Qty: 90 TAB | Refills: 1 | Status: SHIPPED | OUTPATIENT
Start: 2019-09-17 | End: 2019-12-06 | Stop reason: SDUPTHER

## 2019-09-17 NOTE — PROGRESS NOTES
HISTORY OF PRESENT ILLNESS  Alberto Ford is a 80 y.o. female. JIA De La Torre has hypertension, dyslipidemia and CAD, on Lipitor 80 her LDL was in the 70s, HDL was 60, so we switched to Crestor 20, which she tolerates. Cholesterol is now 183, triglycerides 254, HDL 54 and LDL 78. Discussed that given her coronary disease, we are really trying to get her LDL below 70 and will bump from 20 to 40 of the Crestor. LFTs are normal.  Thyroid function is normal.  A1c is 6.3%. She is not exercising much. Discussed importance of this. BP is controlled on Micardis and Atenolol. She declines shots. Review of Systems   Constitutional: Negative for chills, fever and weight loss. Respiratory: Negative for cough, shortness of breath and wheezing. Cardiovascular: Negative for chest pain, palpitations, orthopnea, leg swelling and PND. Gastrointestinal: Negative for heartburn and nausea. Musculoskeletal: Positive for joint pain. Negative for myalgias. Neurological: Negative for dizziness and headaches. Physical Exam   Constitutional: She is oriented to person, place, and time. She appears well-developed and well-nourished. HENT:   Head: Normocephalic and atraumatic. Neck: Normal range of motion. Neck supple. Carotid bruit is not present. No thyromegaly present. Cardiovascular: Normal rate, regular rhythm, S1 normal, S2 normal, normal heart sounds and intact distal pulses. No murmur heard. Pulmonary/Chest: Effort normal and breath sounds normal. No respiratory distress. She has no wheezes. She has no rales. Musculoskeletal: She exhibits no edema. Neurological: She is alert and oriented to person, place, and time. Psychiatric: She has a normal mood and affect. Her behavior is normal.   Nursing note and vitals reviewed. ASSESSMENT and PLAN  Diagnoses and all orders for this visit:    1.  Dyslipidemia, goal LDL below 70-inc dose from 20 to 40 and discussed diet and exercise  -     rosuvastatin (CRESTOR) 40 mg tablet; Take 1 Tab by mouth nightly. 2. IFG (impaired fasting glucose)    3. HTN, goal below 130/80-stable on meds    4.  Calcific coronary arteriosclerosis    Managing with meds  appt in 3mo with labs prior

## 2019-12-06 ENCOUNTER — OFFICE VISIT (OUTPATIENT)
Dept: INTERNAL MEDICINE CLINIC | Age: 69
End: 2019-12-06

## 2019-12-06 VITALS
HEIGHT: 66 IN | TEMPERATURE: 98.6 F | SYSTOLIC BLOOD PRESSURE: 162 MMHG | BODY MASS INDEX: 23.78 KG/M2 | RESPIRATION RATE: 16 BRPM | HEART RATE: 59 BPM | WEIGHT: 148 LBS | DIASTOLIC BLOOD PRESSURE: 69 MMHG | OXYGEN SATURATION: 98 %

## 2019-12-06 DIAGNOSIS — I10 HTN, GOAL BELOW 130/80: ICD-10-CM

## 2019-12-06 DIAGNOSIS — E78.5 DYSLIPIDEMIA, GOAL LDL BELOW 70: Primary | ICD-10-CM

## 2019-12-06 DIAGNOSIS — I25.10 CORONARY ARTERY DISEASE INVOLVING NATIVE CORONARY ARTERY OF NATIVE HEART WITHOUT ANGINA PECTORIS: ICD-10-CM

## 2019-12-06 DIAGNOSIS — I73.9 PVD (PERIPHERAL VASCULAR DISEASE) (HCC): ICD-10-CM

## 2019-12-06 RX ORDER — ROSUVASTATIN CALCIUM 40 MG/1
40 TABLET, COATED ORAL
Qty: 90 TAB | Refills: 1 | Status: SHIPPED | OUTPATIENT
Start: 2019-12-06 | End: 2020-06-17 | Stop reason: SDUPTHER

## 2019-12-06 NOTE — PROGRESS NOTES
HISTORY OF PRESENT ILLNESS  Chantelle Estrada is a 77 y.o. female. JIA Fallon was seen here three months ago. We bumped her Crestor from 20 to 40 mg. Her cholesterol went from 183 to 169, triglycerides went from 254 to 216, LDL went from 78 to 69. She is tolerating the increased dose of Crestor. Her CBC is normal.  Her A1c was 6.3. Her LFTs were all normal.    Hypertension. Tells me BP at home is fine and that she is agitated today. It is running high here and I have asked her to call us in January with report on BP readings. She will see cardiology in February for follow up on coronary disease. Has not had any recent symptoms. Is not exercising much and encouraged to do so. Requests carotid doppler study. No symptoms of dizziness or change in vision. Has had less than 50% plaque seen in the past.       Review of Systems   Constitutional: Positive for malaise/fatigue. Negative for chills, fever and weight loss. Respiratory: Negative for cough, shortness of breath and wheezing. Cardiovascular: Negative for chest pain, palpitations, orthopnea, leg swelling and PND. Gastrointestinal: Negative for heartburn and nausea. Musculoskeletal: Negative for myalgias. Neurological: Negative for dizziness and headaches. Physical Exam  Vitals signs and nursing note reviewed. Constitutional:       Appearance: She is well-developed. HENT:      Head: Normocephalic and atraumatic. Neck:      Musculoskeletal: Normal range of motion and neck supple. Thyroid: No thyromegaly. Vascular: No carotid bruit. Cardiovascular:      Rate and Rhythm: Normal rate and regular rhythm. Heart sounds: Normal heart sounds, S1 normal and S2 normal. No murmur. Pulmonary:      Effort: Pulmonary effort is normal. No respiratory distress. Breath sounds: Normal breath sounds. No wheezing or rales. Neurological:      Mental Status: She is alert and oriented to person, place, and time.    Psychiatric: Behavior: Behavior normal.         ASSESSMENT and PLAN  Diagnoses and all orders for this visit:    1. Dyslipidemia, goal LDL below 70  -     rosuvastatin (CRESTOR) 40 mg tablet; Take 1 Tab by mouth nightly. 2. HTN, goal below 130/80-I am concerned re reading today-di notes better at home and will call in a few weeks with readings    3. Coronary artery disease involving native coronary artery of native heart without angina pectoris    4. PVD (peripheral vascular disease) ([facility] [address]  -     DUPLEX CAROTID BILATERAL;  Future

## 2019-12-23 DIAGNOSIS — E03.4 HYPOTHYROIDISM DUE TO ACQUIRED ATROPHY OF THYROID: ICD-10-CM

## 2019-12-23 RX ORDER — LEVOTHYROXINE SODIUM 100 UG/1
TABLET ORAL
Qty: 90 TAB | Refills: 1 | Status: SHIPPED | OUTPATIENT
Start: 2019-12-23 | End: 2020-05-19 | Stop reason: SDUPTHER

## 2020-01-17 DIAGNOSIS — I10 HTN, GOAL BELOW 130/80: ICD-10-CM

## 2020-01-17 RX ORDER — HYDROCHLOROTHIAZIDE 25 MG/1
25 TABLET ORAL DAILY
Qty: 90 TAB | Refills: 1 | Status: SHIPPED | OUTPATIENT
Start: 2020-01-17 | End: 2020-07-14 | Stop reason: SDUPTHER

## 2020-01-20 ENCOUNTER — HOSPITAL ENCOUNTER (OUTPATIENT)
Dept: ULTRASOUND IMAGING | Age: 70
Discharge: HOME | End: 2020-01-20
Attending: INTERNAL MEDICINE
Payer: MEDICARE

## 2020-01-20 DIAGNOSIS — I73.9 PVD (PERIPHERAL VASCULAR DISEASE) (HCC): ICD-10-CM

## 2020-01-20 LAB
LEFT ICA/CCA SYS: 1.2
RIGHT ICA/CCA SYS: 0.7

## 2020-01-20 PROCEDURE — 93880 EXTRACRANIAL BILAT STUDY: CPT

## 2020-01-21 NOTE — PROGRESS NOTES
Patient verified by name and . Patient expressed understanding and appreciation for the call. She would like clarification on her results. She states that she has had this imaging done over the past several years and would like PCP to clarify whether her 50% blockage has increased or remained that same.

## 2020-01-21 NOTE — PROGRESS NOTES
Patient verified by name and date of birth. Patient notified per PCP. Patient expressed understanding and asked to be read results from 2016 and 2018 results. Advised of results, patient expressed gratitude. No further concerns at this time.

## 2020-03-10 ENCOUNTER — OFFICE VISIT (OUTPATIENT)
Dept: CARDIOLOGY CLINIC | Age: 70
End: 2020-03-10

## 2020-03-10 VITALS
OXYGEN SATURATION: 99 % | DIASTOLIC BLOOD PRESSURE: 64 MMHG | HEART RATE: 68 BPM | RESPIRATION RATE: 20 BRPM | BODY MASS INDEX: 24.04 KG/M2 | WEIGHT: 149.6 LBS | SYSTOLIC BLOOD PRESSURE: 120 MMHG | HEIGHT: 66 IN

## 2020-03-10 DIAGNOSIS — I65.23 BILATERAL CAROTID ARTERY STENOSIS: ICD-10-CM

## 2020-03-10 DIAGNOSIS — I10 ESSENTIAL HYPERTENSION: ICD-10-CM

## 2020-03-10 DIAGNOSIS — I25.10 CORONARY ARTERY DISEASE INVOLVING NATIVE CORONARY ARTERY OF NATIVE HEART WITHOUT ANGINA PECTORIS: Primary | ICD-10-CM

## 2020-03-10 DIAGNOSIS — Z87.891 EX-SMOKER: ICD-10-CM

## 2020-03-10 DIAGNOSIS — R93.1 AGATSTON CAC SCORE, >400: ICD-10-CM

## 2020-03-10 DIAGNOSIS — E78.00 HYPERCHOLESTEROLEMIA: ICD-10-CM

## 2020-03-10 NOTE — PATIENT INSTRUCTIONS
You will be scheduled for a stress echocardiogram. Please wear comfortable clothes and shoes. Please do not eat or drink anything other than water two hours prior to test. Please do not take your atenolol the day of the test. We will call you with results. We will see you back for an annual follow up.

## 2020-03-10 NOTE — PROGRESS NOTES
Visit Vitals  /64 (BP 1 Location: Left arm, BP Patient Position: Sitting)   Pulse 68   Resp 20   Ht 5' 6\" (1.676 m)   Wt 149 lb 9.6 oz (67.9 kg)   SpO2 99%   BMI 24.15 kg/m²

## 2020-03-10 NOTE — PROGRESS NOTES
Chuck France     1950       Lior Serrano MD, [facility]  Date of Visit-3/10/2020   PCP is Nickolas Srinivasan, [address] [facility] [place]  [facility]  HPI:  Chuck France is a 77 y.o. female   One year fu, Follow up of CAD. Had Crestor increased to 40 mg with Dr Theo Burton, fu labs shows LDL now around 69, was 78  Bp was up in December but normal today    Today she notes that she gets anxious sometimes it worrying about her heart disease. She is joining [employer] and going there today. She has more blood work that she is done and her LDL has dropped further down to 67 with TG of  228  And total cholesterol 174.     her hemoglobin is 12.7 creatinine is 0.83 and she is generally feeling no resting symptoms. She does note that when he was line dancing before the winter that she would get a little bit of left arm pain and she is worried about that happen again if she when she goes line dancing. Denies chest pain, edema, syncope or shortness of breath at rest   Has no tachycardia , palpitations or sense of arrythmia      Her EKG today is sinus rhythm within normal limits at a rate of 61 with a QTC of 378 and a QRS duration of 82. 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 FFR not significant. · Pt has quit smoking since 2018 October. Assessment/Plan:      Patient Instructions   You will be scheduled for a stress echocardiogram. Please wear comfortable clothes and shoes. Please do not eat or drink anything other than water two hours prior to test. Please do not take your atenolol the day of the test. We will call you with results. We will see you back for an annual follow up. 1. CAD. native  - no angina.   But she does have left arm pain with line dancing I will exclude the possibility of residual angina and will get a stress echo note she had a calcium score of  2,039 prior to her cath Stopped smoking. Taking high potency statin and aspirin. Key CAD CHF Meds             hydroCHLOROthiazide (HYDRODIURIL) 25 mg tablet (Taking) Take 1 Tab by mouth daily. rosuvastatin (CRESTOR) 40 mg tablet (Taking) Take 1 Tab by mouth nightly. atenolol (TENORMIN) 100 mg tablet (Taking) TAKE 1 TABLET BY MOUTH EVERY DAY    telmisartan (MICARDIS) 40 mg tablet (Taking) TAKE 1 TABLET DAILY    nitroglycerin (NITROSTAT) 0.4 mg SL tablet (Taking) 1 Tab by SubLINGual route every five (5) minutes as needed for Chest Pain. omega 3-dha-epa-fish oil (FISH OIL) 100-160-1,000 mg cap (Taking) Take 2 Caps by mouth daily. aspirin delayed-release 81 mg tablet (Taking) Take  by mouth daily. 2. Lipids on high potency statin as appropriate for secondary prevention. Now on Crestor along with fish oil    3. HTN at goal  At goal , meds and possible side effects reviewed and patient denies  Key CAD CHF Meds             hydroCHLOROthiazide (HYDRODIURIL) 25 mg tablet (Taking) Take 1 Tab by mouth daily. rosuvastatin (CRESTOR) 40 mg tablet (Taking) Take 1 Tab by mouth nightly. atenolol (TENORMIN) 100 mg tablet (Taking) TAKE 1 TABLET BY MOUTH EVERY DAY    telmisartan (MICARDIS) 40 mg tablet (Taking) TAKE 1 TABLET DAILY    nitroglycerin (NITROSTAT) 0.4 mg SL tablet (Taking) 1 Tab by SubLINGual route every five (5) minutes as needed for Chest Pain. omega 3-dha-epa-fish oil (FISH OIL) 100-160-1,000 mg cap (Taking) Take 2 Caps by mouth daily. aspirin delayed-release 81 mg tablet (Taking) Take  by mouth daily. BP Readings from Last 6 Encounters:   03/10/20 120/64   12/06/19 162/69   09/17/19 126/71   05/01/19 124/72   03/19/19 140/82   11/01/18 136/66        On diuretic, beta blocker  ,and ARB  BP Readings from Last 3 Encounters:   03/10/20 120/64   12/06/19 162/69   09/17/19 126/71     4. She is an ex-smoker   5. she has some carotid artery disease of less than 30% it was rechecked this year.   Future Appointments   Date Time Provider Felisha Espino   4/2/2020  9:00 AM ECHO, [address]   4/2/2020  9:00 AM STRESSECHO, [address]   5/19/2020 10:30 AM Nickolas Srinivasan MD [address]   3/17/2021  2:00 PM Yael Galan  [address]         1. Coronary artery disease involving native coronary artery of native heart without angina pectoris    2. Agatston CAC score, >400    3. Hypercholesterolemia    4. Essential hypertension    5. Ex-smoker    6. Bilateral carotid artery stenosis         Cardiac History:   Cath 6/29/18  LAD proximal 60%. FFR not significant  CT coronary arteries 05/22/18 - left main ostial heavy calcification and left main distally. LAD heavy calcification proximal distal portion and heavy calcification D1. Left circumflex heavy calcified plaque in the proximal portion. RCA heavy calcified plaque of mid and distal RCA. Calcium score 03/29/18 showed calcium score of 2,039, large amount of calcium in LAD and RCA. Carotid dopplers 02/13/18 showed less than 50% stenosis of right internal and less than 50% of the left internal carotid arteries. CT scan 2015 showed calcium in the left main. CT done for pulmonary nodule previously. Stress echo 04/11/18, Dr. Garcia Lower - EKG changes, non diagnostic, walked 5 minutes, no echo changes. ROS-except as noted above. . A complete cardiac and respiratory are reviewed and negative except as above ; Resp-denies wheezing  or productive cough,.  Const- No unusual weight loss or fever; Neuro-no recent seizure or CVA ; GI- No BRBPR, abdom pain, bloating ; - no  hematuria     Past Medical History:   Diagnosis Date    Agatston CAC score, >400 6/4/2018    CAD (coronary artery disease), native coronary artery 06/29/2018    cath 6-29-18 LAD prox 50-60% lesion, FFR neg    Calcific coronary arteriosclerosis 6/4/2018    Diverticulitis 7/28/2009    Ex-smoker 6/4/2018    HTN 7/28/2009    Hypercholesterolemia 7/28/2009    Hypothyroid 7/28/2009      Social Hx= reports that she has quit smoking. Her smoking use included cigarettes. She has a 0.50 pack-year smoking history. She has never used smokeless tobacco. She reports current alcohol use of about 3.3 standard drinks of alcohol per week. She reports that she does not use drugs. Exam and Labs:    Visit Vitals  /64 (BP 1 Location: Left arm, BP Patient Position: Sitting)   Pulse 68   Resp 20   Ht 5' 6\" (1.676 m)   Wt 149 lb 9.6 oz (67.9 kg)   SpO2 99%   BMI 24.15 kg/m²    Constitutional:  NAD, comfortable  Head: NC,AT. Eyes: No scleral icterus. Neck:  Neck supple. No JVD present. Throat: moist mucous membranes. Chest: Effort normal & normal respiratory excursion . Neurological: alert, conversant and oriented . Skin: Skin is not cold. No obvious systemic rash noted. Not diaphoretic. No erythema. Psychiatric:  Grossly normal mood and affect. Behavior appears normal. Extremities:  no clubbing or cyanosis. Abdomen: non distended    Lungs:breath sounds normal. No stridor. distress, wheezes or  Rales. Heart: normal rate, regular rhythm, normal S1, S2, no murmurs, rubs, clicks or gallops , PMI non displaced. Edema: Edema is none. Wt Readings from Last 3 Encounters:   03/10/20 149 lb 9.6 oz (67.9 kg)   12/06/19 148 lb (67.1 kg)   09/17/19 149 lb (67.6 kg)      BP Readings from Last 3 Encounters:   03/10/20 120/64   12/06/19 162/69   09/17/19 126/71      Current Outpatient Medications   Medication Sig    hydroCHLOROthiazide (HYDRODIURIL) 25 mg tablet Take 1 Tab by mouth daily.  levothyroxine (SYNTHROID) 100 mcg tablet TAKE 1 TABLET DAILY BEFORE BREAKFAST    rosuvastatin (CRESTOR) 40 mg tablet Take 1 Tab by mouth nightly.  atenolol (TENORMIN) 100 mg tablet TAKE 1 TABLET BY MOUTH EVERY DAY    telmisartan (MICARDIS) 40 mg tablet TAKE 1 TABLET DAILY    magnesium oxide (MAG-OX) 400 mg tablet TAKE 1 TAB BY MOUTH DAILY.     nitroglycerin (NITROSTAT) 0.4 mg SL tablet 1 Tab by SubLINGual route every five (5) minutes as needed for Chest Pain.  esomeprazole (NEXIUM) 20 mg capsule Take 20 mg by mouth every other day.  omega 3-dha-epa-fish oil (FISH OIL) 100-160-1,000 mg cap Take 2 Caps by mouth daily.  ibuprofen (MOTRIN) 800 mg tablet Take 1 Tab by mouth every eight (8) hours as needed for Pain. (Patient taking differently: Take 400 mg by mouth as needed for Pain.)    loratadine (CLARITIN) 10 mg tablet Take 10 mg by mouth daily as needed.  aspirin delayed-release 81 mg tablet Take  by mouth daily.  chlorhexidine (PERIDEX) 0.12 % solution     DYMISTA 137-50 mcg/spray spry 2 Sprays by Nasal route as needed. No current facility-administered medications for this visit. Impression see above.

## 2020-03-10 NOTE — Clinical Note
3/10/20 Patient: Shante Josue YOB: 1950 Date of Visit: 3/10/2020 Carlitos Henao MD 
[place] [address] [address] [facility] [id number] VIA In Basket Dear Carlitos Henao MD, Thank you for referring Ms. Chayito Rueda to [facility] for evaluation. My notes for this consultation are attached. If you have questions, please do not hesitate to call me. I look forward to following your patient along with you.  
 
 
Sincerely, 
 
Miki Lake MD

## 2020-03-15 PROBLEM — I25.10 CALCIFIC CORONARY ARTERIOSCLEROSIS: Status: RESOLVED | Noted: 2018-06-04 | Resolved: 2020-03-15

## 2020-03-15 PROBLEM — I65.23 BILATERAL CAROTID ARTERY STENOSIS: Status: ACTIVE | Noted: 2020-03-15

## 2020-03-20 DIAGNOSIS — E83.42 HYPOMAGNESEMIA: ICD-10-CM

## 2020-03-20 RX ORDER — LANOLIN ALCOHOL/MO/W.PET/CERES
CREAM (GRAM) TOPICAL
Qty: 90 TAB | Refills: 1 | Status: SHIPPED | OUTPATIENT
Start: 2020-03-20 | End: 2020-12-22 | Stop reason: SDUPTHER

## 2020-03-24 ENCOUNTER — TELEPHONE (OUTPATIENT)
Dept: CARDIOLOGY CLINIC | Age: 70
End: 2020-03-24

## 2020-03-24 NOTE — TELEPHONE ENCOUNTER
Dr. Casey Anand,    Pt is scheduled for a stress echo on April 2. Does the test need to be done that day or can it be pushed out. If pushed out, how far?   Please advise

## 2020-03-25 NOTE — TELEPHONE ENCOUNTER
Spoke with patient. Sheis able to exercise now with no symptoms. Stress echo rescheduled for [email or web address] am.  Pt advised to hold atenolol for 24 hours and call us if symptoms begin with exercise and we could discuss other options. Pt verbalized understanding.

## 2020-03-25 NOTE — TELEPHONE ENCOUNTER
Since she is pain free at rest  I would suggest she schedule for June  If she gets pain or left arm pain with her line dancing or other physical activity then have her call back sooner and we can look at options  As long as no more pain, can wait  Future Appointments   Date Time Provider Felisha Espino   4/2/2020  9:00 AM ECHO, [address] [facility]   4/2/2020  9:00 AM STRESSECHO, [address] [facility]   5/19/2020 10:30 AM Isrrael Fermin MD [address] [address]   3/17/2021  2:00 PM Jeanne Tolentino  [address]

## 2020-03-31 DIAGNOSIS — I10 ESSENTIAL HYPERTENSION WITH GOAL BLOOD PRESSURE LESS THAN 130/80: ICD-10-CM

## 2020-03-31 RX ORDER — TELMISARTAN 40 MG/1
TABLET ORAL
Qty: 90 TAB | Refills: 0 | Status: SHIPPED | OUTPATIENT
Start: 2020-03-31 | End: 2020-07-01 | Stop reason: SDUPTHER

## 2020-04-02 NOTE — TELEPHONE ENCOUNTER
Reached out to patient to assist w/ scheduling a medication evaluation / follow up appointment via  Virtual Visit.  Patient is scheduled for an Office Visit 05/19 @ 10:30 and prefers to leave as is for now - thank you

## 2020-05-12 ENCOUNTER — TELEPHONE (OUTPATIENT)
Dept: INTERNAL MEDICINE CLINIC | Age: 70
End: 2020-05-12

## 2020-05-12 DIAGNOSIS — E03.4 HYPOTHYROIDISM DUE TO ACQUIRED ATROPHY OF THYROID: ICD-10-CM

## 2020-05-12 DIAGNOSIS — I10 ESSENTIAL HYPERTENSION WITH GOAL BLOOD PRESSURE LESS THAN 130/80: ICD-10-CM

## 2020-05-12 DIAGNOSIS — R73.01 IFG (IMPAIRED FASTING GLUCOSE): ICD-10-CM

## 2020-05-12 DIAGNOSIS — I10 ESSENTIAL HYPERTENSION WITH GOAL BLOOD PRESSURE LESS THAN 130/80: Primary | ICD-10-CM

## 2020-05-12 DIAGNOSIS — E78.5 DYSLIPIDEMIA, GOAL LDL BELOW 70: ICD-10-CM

## 2020-05-12 NOTE — TELEPHONE ENCOUNTER
----- Message from Leroy Morgan sent at 5/12/2020 11:40 AM EDT -----  Regarding: DR Vangie Newman / Marco A Liao Message/Vendor Calls    Pt is requesting an order for lab work to be sent to:     [email or web address]    Callback required   Best contact number(s): [phone]                      Leroy Morgan

## 2020-05-12 NOTE — TELEPHONE ENCOUNTER
Notified patient this LPN is unable to e-mail the orders but can fax them to a [facility] of her choice. Patient asked the lab order be faxed to [phone] attn Antonio Rose as this is the person who will be drawing her labs for her.

## 2020-05-15 LAB
ALBUMIN SERPL-MCNC: 4.8 G/DL (ref 3.8–4.8)
ALBUMIN/GLOB SERPL: 2.3 {RATIO} (ref 1.2–2.2)
ALP SERPL-CCNC: 66 IU/L (ref 39–117)
ALT SERPL-CCNC: 23 IU/L (ref 0–32)
AST SERPL-CCNC: 20 IU/L (ref 0–40)
BILIRUB SERPL-MCNC: 0.4 MG/DL (ref 0–1.2)
BUN SERPL-MCNC: 12 MG/DL (ref 8–27)
BUN/CREAT SERPL: 15 (ref 12–28)
CALCIUM SERPL-MCNC: 9.5 MG/DL (ref 8.7–10.3)
CHLORIDE SERPL-SCNC: 99 MMOL/L (ref 96–106)
CHOLEST SERPL-MCNC: 161 MG/DL (ref 100–199)
CO2 SERPL-SCNC: 22 MMOL/L (ref 20–29)
CREAT SERPL-MCNC: 0.82 MG/DL (ref 0.57–1)
EST. AVERAGE GLUCOSE BLD GHB EST-MCNC: 134 MG/DL
GLOBULIN SER CALC-MCNC: 2.1 G/DL (ref 1.5–4.5)
GLUCOSE SERPL-MCNC: 131 MG/DL (ref 65–99)
HBA1C MFR BLD: 6.3 % (ref 4.8–5.6)
HDLC SERPL-MCNC: 57 MG/DL
INTERPRETATION, 910389: NORMAL
LDLC SERPL CALC-MCNC: 63 MG/DL (ref 0–99)
POTASSIUM SERPL-SCNC: 4 MMOL/L (ref 3.5–5.2)
PROT SERPL-MCNC: 6.9 G/DL (ref 6–8.5)
SODIUM SERPL-SCNC: 136 MMOL/L (ref 134–144)
SPECIMEN STATUS REPORT, ROLRST: NORMAL
T4 FREE SERPL-MCNC: 1.58 NG/DL (ref 0.82–1.77)
TRIGL SERPL-MCNC: 204 MG/DL (ref 0–149)
TSH SERPL DL<=0.005 MIU/L-ACNC: 0.57 UIU/ML (ref 0.45–4.5)
VLDLC SERPL CALC-MCNC: 41 MG/DL (ref 5–40)

## 2020-05-19 ENCOUNTER — VIRTUAL VISIT (OUTPATIENT)
Dept: INTERNAL MEDICINE CLINIC | Age: 70
End: 2020-05-19

## 2020-05-19 VITALS
HEART RATE: 62 BPM | DIASTOLIC BLOOD PRESSURE: 68 MMHG | SYSTOLIC BLOOD PRESSURE: 125 MMHG | OXYGEN SATURATION: 98 % | BODY MASS INDEX: 23.89 KG/M2 | WEIGHT: 148 LBS

## 2020-05-19 DIAGNOSIS — I10 ESSENTIAL HYPERTENSION: Primary | ICD-10-CM

## 2020-05-19 DIAGNOSIS — E03.4 HYPOTHYROIDISM DUE TO ACQUIRED ATROPHY OF THYROID: ICD-10-CM

## 2020-05-19 DIAGNOSIS — E78.00 HYPERCHOLESTEROLEMIA: ICD-10-CM

## 2020-05-19 DIAGNOSIS — I25.10 CORONARY ARTERY DISEASE INVOLVING NATIVE CORONARY ARTERY OF NATIVE HEART WITHOUT ANGINA PECTORIS: ICD-10-CM

## 2020-05-19 DIAGNOSIS — R73.01 IFG (IMPAIRED FASTING GLUCOSE): ICD-10-CM

## 2020-05-19 RX ORDER — LEVOTHYROXINE SODIUM 100 UG/1
TABLET ORAL
Qty: 90 TAB | Refills: 1 | Status: SHIPPED | OUTPATIENT
Start: 2020-05-19 | End: 2020-12-22

## 2020-05-19 RX ORDER — VITAMIN E 1000 UNIT
2000 CAPSULE ORAL DAILY
COMMUNITY

## 2020-05-19 NOTE — PROGRESS NOTES
Unable to get doxy to work so did a phone visit  Reviewed labs-hgba1c 6.3 and discussed dec in carbs  She walks daily no cp no sob  ldl 63 xerol 161 on crestor no myalgias  Discussed diet and exercise  Time on phone 15 min

## 2020-05-22 ENCOUNTER — TELEPHONE (OUTPATIENT)
Dept: CARDIOLOGY CLINIC | Age: 70
End: 2020-05-22

## 2020-05-22 NOTE — TELEPHONE ENCOUNTER
Patient states she would like reschedule her stress test to a day where can see Dr. Logan Lino as well to get her results or if she can have the test done sooner. She is leaving for vacation to [place] on 6/4/20 and would like to have test and results done by then. Please advise.    Phone: [phone]

## 2020-05-22 NOTE — TELEPHONE ENCOUNTER
Called pt two patient identifiers confirmed. Rescheduled Pt to have Stress test on 5/27/2020 @ 10:00 am. Pt verbalized understanding of information discussed w/ no further questions at this time.

## 2020-05-26 ENCOUNTER — TELEPHONE (OUTPATIENT)
Dept: CARDIOLOGY CLINIC | Age: 70
End: 2020-05-26

## 2020-05-26 NOTE — TELEPHONE ENCOUNTER
Patient would like to speak to someone regarding her stress echo that she has scheduled for in the morning. Patient is concerned how she will be able to do a treadmill if she is required to wear a mask. Please advise.     Phone: [phone] or [phone]

## 2020-05-28 DIAGNOSIS — I10 ESSENTIAL HYPERTENSION: ICD-10-CM

## 2020-05-28 RX ORDER — NITROGLYCERIN 0.4 MG/1
0.4 TABLET SUBLINGUAL
Qty: 1 BOTTLE | Refills: 1 | Status: SHIPPED | OUTPATIENT
Start: 2020-05-28

## 2020-05-28 RX ORDER — ATENOLOL 100 MG/1
100 TABLET ORAL DAILY
Qty: 90 TAB | Refills: 1 | Status: SHIPPED | OUTPATIENT
Start: 2020-05-28 | End: 2020-11-28

## 2020-05-28 NOTE — TELEPHONE ENCOUNTER
Cardiologist: Dr. Ayesha White    Last appt: 2020  Future Appointments   Date Time Provider Felisha Espino   3/17/2021  2:00 PM Rebecca Weber  [address]       Requested Prescriptions     Signed Prescriptions Disp Refills    nitroglycerin (NITROSTAT) 0.4 mg SL tablet 1 Bottle 1     Si Tab by SubLINGual route every five (5) minutes as needed for Chest Pain.      Authorizing Provider: Paulina Tilley     Ordering User: Duke Torres         Refills VO per Dr. Ayesha White.

## 2020-05-29 ENCOUNTER — TELEPHONE (OUTPATIENT)
Dept: CARDIOLOGY CLINIC | Age: 70
End: 2020-05-29

## 2020-05-29 NOTE — TELEPHONE ENCOUNTER
Called pt two patient identifiers confirmed. Notified pt about Echo results and Dr. Mcneil Area recommendations. Pt verbalized understanding of information discussed w/ no further questions at this time.

## 2020-05-29 NOTE — TELEPHONE ENCOUNTER
Patient would like to speak with someone regarding results posted on [employer]. She would like to know the difference in stress echo results from two years ago.      Phone; [phone]

## 2020-06-17 DIAGNOSIS — E78.5 DYSLIPIDEMIA, GOAL LDL BELOW 70: ICD-10-CM

## 2020-06-17 RX ORDER — ROSUVASTATIN CALCIUM 40 MG/1
40 TABLET, COATED ORAL
Qty: 90 TAB | Refills: 1 | Status: SHIPPED | OUTPATIENT
Start: 2020-06-17 | End: 2020-09-24

## 2020-07-14 DIAGNOSIS — I10 HTN, GOAL BELOW 130/80: ICD-10-CM

## 2020-07-14 RX ORDER — HYDROCHLOROTHIAZIDE 25 MG/1
25 TABLET ORAL DAILY
Qty: 90 TAB | Refills: 1 | Status: SHIPPED | OUTPATIENT
Start: 2020-07-14 | End: 2021-01-06 | Stop reason: SDUPTHER

## 2020-08-12 ENCOUNTER — HOSPITAL ENCOUNTER (OUTPATIENT)
Dept: MAMMOGRAPHY | Age: 70
Discharge: HOME | End: 2020-08-12
Attending: INTERNAL MEDICINE
Payer: MEDICARE

## 2020-08-12 DIAGNOSIS — Z12.31 VISIT FOR SCREENING MAMMOGRAM: ICD-10-CM

## 2020-08-12 PROCEDURE — 77063 BREAST TOMOSYNTHESIS BI: CPT

## 2020-09-23 DIAGNOSIS — E78.5 DYSLIPIDEMIA, GOAL LDL BELOW 70: ICD-10-CM

## 2020-09-24 RX ORDER — ROSUVASTATIN CALCIUM 40 MG/1
TABLET, COATED ORAL
Qty: 90 TAB | Refills: 1 | Status: SHIPPED | OUTPATIENT
Start: 2020-09-24 | End: 2021-05-26

## 2020-11-23 ENCOUNTER — TELEPHONE (OUTPATIENT)
Dept: INTERNAL MEDICINE CLINIC | Age: 70
End: 2020-11-23

## 2020-11-23 NOTE — TELEPHONE ENCOUNTER
----- Message from Dione Cantu sent at 11/23/2020 11:49 AM EST -----  Regarding: Dr. Bahman Demarco first and last name and relationship to patient (if not the patient):  Best contact number: [phone] or [phone]   Preferred date and time: before this Thursday   Scheduled appointment date and time: N/A   Reason for appointment:Patient said Chayito left her a message to schedule virtual apt with Dr. Chai Montgomery prior to thanksgiving day  Details to clarify the request:

## 2020-11-28 DIAGNOSIS — I10 ESSENTIAL HYPERTENSION: ICD-10-CM

## 2020-11-28 RX ORDER — ATENOLOL 100 MG/1
TABLET ORAL
Qty: 90 TAB | Refills: 1 | Status: SHIPPED | OUTPATIENT
Start: 2020-11-28 | End: 2021-05-26

## 2020-12-22 DIAGNOSIS — E83.42 HYPOMAGNESEMIA: ICD-10-CM

## 2020-12-22 DIAGNOSIS — E03.4 HYPOTHYROIDISM DUE TO ACQUIRED ATROPHY OF THYROID: ICD-10-CM

## 2020-12-22 DIAGNOSIS — I10 ESSENTIAL HYPERTENSION WITH GOAL BLOOD PRESSURE LESS THAN 130/80: ICD-10-CM

## 2020-12-22 RX ORDER — LEVOTHYROXINE SODIUM 100 UG/1
TABLET ORAL
Qty: 90 TAB | Refills: 1 | Status: SHIPPED | OUTPATIENT
Start: 2020-12-22 | End: 2021-01-12 | Stop reason: DRUGHIGH

## 2020-12-22 RX ORDER — TELMISARTAN 40 MG/1
40 TABLET ORAL DAILY
Qty: 90 TAB | Refills: 1 | Status: SHIPPED | OUTPATIENT
Start: 2020-12-22 | End: 2021-06-21

## 2020-12-22 RX ORDER — LANOLIN ALCOHOL/MO/W.PET/CERES
400 CREAM (GRAM) TOPICAL DAILY
Qty: 90 TAB | Refills: 1 | Status: SHIPPED | OUTPATIENT
Start: 2020-12-22 | End: 2021-11-03 | Stop reason: ALTCHOICE

## 2020-12-22 RX ORDER — LEVOTHYROXINE SODIUM 100 UG/1
TABLET ORAL
Qty: 90 TAB | Refills: 1 | Status: CANCELLED | OUTPATIENT
Start: 2020-12-22

## 2021-01-05 ENCOUNTER — TELEPHONE (OUTPATIENT)
Dept: INTERNAL MEDICINE CLINIC | Age: 71
End: 2021-01-05

## 2021-01-05 DIAGNOSIS — I10 HTN, GOAL BELOW 130/80: ICD-10-CM

## 2021-01-05 DIAGNOSIS — R73.01 IFG (IMPAIRED FASTING GLUCOSE): ICD-10-CM

## 2021-01-05 DIAGNOSIS — E03.4 HYPOTHYROIDISM DUE TO ACQUIRED ATROPHY OF THYROID: ICD-10-CM

## 2021-01-05 DIAGNOSIS — I10 HTN, GOAL BELOW 130/80: Primary | ICD-10-CM

## 2021-01-05 DIAGNOSIS — E78.00 HYPERCHOLESTEROLEMIA: ICD-10-CM

## 2021-01-05 DIAGNOSIS — E78.5 DYSLIPIDEMIA, GOAL LDL BELOW 70: ICD-10-CM

## 2021-01-05 RX ORDER — HYDROCHLOROTHIAZIDE 25 MG/1
25 TABLET ORAL DAILY
Qty: 90 TAB | Refills: 1 | OUTPATIENT
Start: 2021-01-05

## 2021-01-05 NOTE — TELEPHONE ENCOUNTER
Per patient , PCP orders blood work before her apt , requesting blood workk orders prior to her med check apt on 01/21 , PSr advised blood work will be ordered that the time but patient [facility] Patient is requesting her HCTZ 25 mg be refilled since she has scheduled her med check apt .        Patient is requesting a call back [phone]

## 2021-01-06 DIAGNOSIS — I10 HTN, GOAL BELOW 130/80: ICD-10-CM

## 2021-01-06 RX ORDER — HYDROCHLOROTHIAZIDE 25 MG/1
TABLET ORAL
Qty: 90 TAB | Refills: 1 | Status: SHIPPED | OUTPATIENT
Start: 2021-01-06 | End: 2021-07-07 | Stop reason: SDUPTHER

## 2021-01-06 RX ORDER — HYDROCHLOROTHIAZIDE 25 MG/1
25 TABLET ORAL DAILY
Qty: 30 TAB | Refills: 0 | Status: SHIPPED | OUTPATIENT
Start: 2021-01-06 | End: 2021-01-06

## 2021-01-06 NOTE — TELEPHONE ENCOUNTER
Patient notified her lab orders are ready. Patient requests for the orders to be faxed to [phone] & she will get them done tomorrow. Lab orders faxed.

## 2021-01-07 ENCOUNTER — HOSPITAL ENCOUNTER (OUTPATIENT)
Dept: LAB | Age: 71
Discharge: HOME | End: 2021-01-07
Payer: MEDICARE

## 2021-01-07 PROCEDURE — 83036 HEMOGLOBIN GLYCOSYLATED A1C: CPT

## 2021-01-07 PROCEDURE — 80061 LIPID PANEL: CPT

## 2021-01-07 PROCEDURE — 80053 COMPREHEN METABOLIC PANEL: CPT

## 2021-01-07 PROCEDURE — 84443 ASSAY THYROID STIM HORMONE: CPT

## 2021-01-08 LAB
ALBUMIN SERPL-MCNC: 4.5 G/DL (ref 3.8–4.8)
ALBUMIN/GLOB SERPL: 2 {RATIO} (ref 1.2–2.2)
ALP SERPL-CCNC: 69 IU/L (ref 39–117)
ALT SERPL-CCNC: 21 IU/L (ref 0–32)
AST SERPL-CCNC: 20 IU/L (ref 0–40)
BILIRUB SERPL-MCNC: 0.4 MG/DL (ref 0–1.2)
BUN SERPL-MCNC: 14 MG/DL (ref 8–27)
BUN/CREAT SERPL: 18 (ref 12–28)
CALCIUM SERPL-MCNC: 9.4 MG/DL (ref 8.7–10.3)
CHLORIDE SERPL-SCNC: 97 MMOL/L (ref 96–106)
CHOLEST SERPL-MCNC: 160 MG/DL (ref 100–199)
CO2 SERPL-SCNC: 24 MMOL/L (ref 20–29)
CREAT SERPL-MCNC: 0.8 MG/DL (ref 0.57–1)
EST. AVERAGE GLUCOSE BLD GHB EST-MCNC: 134 MG/DL
GLOBULIN SER CALC-MCNC: 2.2 G/DL (ref 1.5–4.5)
GLUCOSE SERPL-MCNC: 94 MG/DL (ref 65–99)
HBA1C MFR BLD: 6.3 % (ref 4.8–5.6)
HDLC SERPL-MCNC: 60 MG/DL
INTERPRETATION, 910389: NORMAL
LDLC SERPL CALC-MCNC: 77 MG/DL (ref 0–99)
POTASSIUM SERPL-SCNC: 3.9 MMOL/L (ref 3.5–5.2)
PROT SERPL-MCNC: 6.7 G/DL (ref 6–8.5)
SODIUM SERPL-SCNC: 136 MMOL/L (ref 134–144)
TRIGL SERPL-MCNC: 129 MG/DL (ref 0–149)
TSH SERPL DL<=0.005 MIU/L-ACNC: 0.36 UIU/ML (ref 0.45–4.5)
VLDLC SERPL CALC-MCNC: 23 MG/DL (ref 5–40)

## 2021-01-12 ENCOUNTER — OFFICE VISIT (OUTPATIENT)
Dept: INTERNAL MEDICINE CLINIC | Age: 71
End: 2021-01-12
Payer: MEDICARE

## 2021-01-12 VITALS
BODY MASS INDEX: 24.27 KG/M2 | SYSTOLIC BLOOD PRESSURE: 124 MMHG | HEART RATE: 63 BPM | RESPIRATION RATE: 16 BRPM | HEIGHT: 66 IN | DIASTOLIC BLOOD PRESSURE: 76 MMHG | WEIGHT: 151 LBS | TEMPERATURE: 97.9 F | OXYGEN SATURATION: 99 %

## 2021-01-12 DIAGNOSIS — E03.4 HYPOTHYROIDISM DUE TO ACQUIRED ATROPHY OF THYROID: ICD-10-CM

## 2021-01-12 DIAGNOSIS — I25.10 CORONARY ARTERY DISEASE INVOLVING NATIVE CORONARY ARTERY OF NATIVE HEART WITHOUT ANGINA PECTORIS: ICD-10-CM

## 2021-01-12 DIAGNOSIS — Z00.00 MEDICARE ANNUAL WELLNESS VISIT, SUBSEQUENT: Primary | ICD-10-CM

## 2021-01-12 DIAGNOSIS — Z12.11 COLON CANCER SCREENING: ICD-10-CM

## 2021-01-12 DIAGNOSIS — I10 ESSENTIAL HYPERTENSION: ICD-10-CM

## 2021-01-12 DIAGNOSIS — Z78.0 POST-MENOPAUSAL: ICD-10-CM

## 2021-01-12 PROCEDURE — G9899 SCRN MAM PERF RSLTS DOC: HCPCS | Performed by: INTERNAL MEDICINE

## 2021-01-12 PROCEDURE — G8420 CALC BMI NORM PARAMETERS: HCPCS | Performed by: INTERNAL MEDICINE

## 2021-01-12 PROCEDURE — G8536 NO DOC ELDER MAL SCRN: HCPCS | Performed by: INTERNAL MEDICINE

## 2021-01-12 PROCEDURE — 3017F COLORECTAL CA SCREEN DOC REV: CPT | Performed by: INTERNAL MEDICINE

## 2021-01-12 PROCEDURE — 1090F PRES/ABSN URINE INCON ASSESS: CPT | Performed by: INTERNAL MEDICINE

## 2021-01-12 PROCEDURE — G8427 DOCREV CUR MEDS BY ELIG CLIN: HCPCS | Performed by: INTERNAL MEDICINE

## 2021-01-12 PROCEDURE — 1101F PT FALLS ASSESS-DOCD LE1/YR: CPT | Performed by: INTERNAL MEDICINE

## 2021-01-12 PROCEDURE — G0439 PPPS, SUBSEQ VISIT: HCPCS | Performed by: INTERNAL MEDICINE

## 2021-01-12 PROCEDURE — G8752 SYS BP LESS 140: HCPCS | Performed by: INTERNAL MEDICINE

## 2021-01-12 PROCEDURE — G8510 SCR DEP NEG, NO PLAN REQD: HCPCS | Performed by: INTERNAL MEDICINE

## 2021-01-12 PROCEDURE — G0463 HOSPITAL OUTPT CLINIC VISIT: HCPCS | Performed by: INTERNAL MEDICINE

## 2021-01-12 PROCEDURE — 99214 OFFICE O/P EST MOD 30 MIN: CPT | Performed by: INTERNAL MEDICINE

## 2021-01-12 PROCEDURE — G8754 DIAS BP LESS 90: HCPCS | Performed by: INTERNAL MEDICINE

## 2021-01-12 PROCEDURE — G8400 PT W/DXA NO RESULTS DOC: HCPCS | Performed by: INTERNAL MEDICINE

## 2021-01-12 RX ORDER — LEVOTHYROXINE SODIUM 88 UG/1
88 TABLET ORAL
Qty: 90 TAB | Refills: 1 | Status: SHIPPED | OUTPATIENT
Start: 2021-01-12 | End: 2021-03-25

## 2021-01-12 NOTE — PROGRESS NOTES
HISTORY OF PRESENT ILLNESS  Willard Baird is a 72 y.o. female. HPI  Seen for med check and wellness review. Issues:  1. Hypertension. Pressure good at home in the 117-150/70 range and no headaches or dizzy spells. 2. Dyslipidemia, on 40 mg of Crestor. LDL is 77. Discussed goal is below 70. Discussed could add Zetia. Will defer to cardiology, whom she sees in the spring. 3. Thyroid, on 100 mcg. TSH is over-suppressed and she reports some palpitations. Will drop dose from 100 to 88 mcg, check levels in three months. 4. Preventive. Due for bone density, due for colon cancer screening. Denies colonoscopy. Will order Cologuard. Up to date on mammogram.      Review of Systems   Constitutional: Negative for chills, fever and weight loss. Respiratory: Negative for cough, shortness of breath and wheezing. Cardiovascular: Positive for palpitations. Negative for chest pain, orthopnea, leg swelling and PND. Gastrointestinal: Negative for abdominal pain, heartburn, nausea and vomiting. Musculoskeletal: Negative for myalgias. Neurological: Negative for dizziness and headaches. Endo/Heme/Allergies: Positive for environmental allergies. Physical Exam  Vitals signs and nursing note reviewed. Constitutional:       Appearance: She is well-developed. HENT:      Head: Normocephalic and atraumatic. Neck:      Musculoskeletal: Normal range of motion and neck supple. Thyroid: No thyromegaly. Vascular: No carotid bruit. Cardiovascular:      Rate and Rhythm: Normal rate and regular rhythm. Heart sounds: Normal heart sounds, S1 normal and S2 normal. No murmur. Pulmonary:      Effort: Pulmonary effort is normal. No respiratory distress. Breath sounds: Normal breath sounds. No wheezing or rales. Neurological:      Mental Status: She is alert and oriented to person, place, and time.    Psychiatric:         Behavior: Behavior normal.         ASSESSMENT and PLAN  Diagnoses and all orders for this visit:    1. Medicare annual wellness visit, subsequent    2. Essential hypertension    3. Coronary artery disease involving native coronary artery of native heart without angina pectoris    4. Hypothyroidism due to acquired atrophy of thyroid  -     Synthroid 88 mcg tablet; Take 1 Tab by mouth Daily (before breakfast). -     TSH 3RD GENERATION; Future  -     T4, FREE; Future    5. Post-menopausal  -     DEXA BONE DENSITY STUDY AXIAL; Future    6. Colon cancer screening  -     COLOGUARD TEST (FECAL DNA COLORECTAL CANCER SCREENING)      the following changes in treatment are made: dec from 100 to 88 dose of thyroid labs in 3mo  appt in 6mo  This is the Subsequent Medicare Annual Wellness Exam, performed 12 months or more after the Initial AWV or the last Subsequent AWV    I have reviewed the patient's medical history in detail and updated the computerized patient record. Depression Risk Factor Screening:     3 most recent PHQ Screens 1/12/2021   Little interest or pleasure in doing things Not at all   Feeling down, depressed, irritable, or hopeless Not at all   Total Score PHQ 2 0       Alcohol Risk Screen    Do you average more than 1 drink per night or more than 7 drinks a week:  No    On any one occasion in the past three months have you have had more than 3 drinks containing alcohol:  No        Functional Ability and Level of Safety:    Hearing: Hearing is good. Activities of Daily Living: The home contains: no safety equipment. Patient does total self care      Ambulation: with no difficulty     Fall Risk:  Fall Risk Assessment, last 12 mths 1/12/2021   Able to walk? Yes   Fall in past 12 months? 0   Do you feel unsteady?  0   Are you worried about falling 0      Abuse Screen:  Patient is not abused       Cognitive Screening    Has your family/caregiver stated any concerns about your memory: no     Cognitive Screening: Normal - Verbal Fluency Test    Assessment/Plan   Education and counseling provided:  Are appropriate based on today's review and evaluation  Influenza Vaccine  Screening Mammography  Colorectal cancer screening tests  Bone mass measurement (DEXA)    Diagnoses and all orders for this visit:    1. Medicare annual wellness visit, subsequent    2. Essential hypertension    3. Coronary artery disease involving native coronary artery of native heart without angina pectoris    4. Hypothyroidism due to acquired atrophy of thyroid  -     Synthroid 88 mcg tablet; Take 1 Tab by mouth Daily (before breakfast). -     TSH 3RD GENERATION; Future  -     T4, FREE; Future    5. Post-menopausal  -     DEXA BONE DENSITY STUDY AXIAL; Future    6.  Colon cancer screening  -     COLOGUARD TEST (FECAL DNA COLORECTAL CANCER SCREENING)        Health Maintenance Due     Health Maintenance Due   Topic Date Due    Hepatitis C Screening  1950    DTaP/Tdap/Td series (1 - Tdap) 09/25/1972    Shingrix Vaccine Age 50> (1 of 2) 09/25/2001    Colorectal Cancer Screening Combo  09/25/2001    GLAUCOMA SCREENING Q2Y  09/25/2016    Bone Densitometry (Dexa) Screening  09/25/2016    Pneumococcal 65+ years (1 of 1 - PPSV23) 09/25/2016    Medicare Yearly Exam  12/09/2018    Flu Vaccine (1) 09/01/2020       Patient Care Team   Patient Care Team:  Toby Torres MD as PCP - [facility], Dell Horton MD as PCP - [facility]  Melani Avery MD (Cardiology)    History     Patient Active Problem List   Diagnosis Code    HTN (hypertension) I5    Hypothyroid E03.9    Hypercholesterolemia E78.00    Diverticulitis K57.92    Hiatal hernia K44.9    Agatston CAC score, >400 R93.1    Ex-smoker Z87.891    CAD (coronary artery disease), native coronary artery I25.10    Bilateral carotid artery stenosis I65.23     Past Medical History:   Diagnosis Date    Agatston CAC score, >400 6/4/2018    Bilateral carotid artery stenosis 3/15/2020    CAD (coronary artery disease), native coronary artery 06/29/2018    cath 6-29-18 LAD prox 50-60% lesion, FFR neg    Calcific coronary arteriosclerosis 6/4/2018    Diverticulitis 7/28/2009    Ex-smoker 6/4/2018    HTN 7/28/2009    Hypercholesterolemia 7/28/2009    Hypothyroid 7/28/2009      Past Surgical History:   Procedure Laterality Date    HX ORTHOPAEDIC      anterior cervical discectomy and fusion     Current Outpatient Medications   Medication Sig Dispense Refill    Synthroid 88 mcg tablet Take 1 Tab by mouth Daily (before breakfast). 90 Tab 1    hydroCHLOROthiazide (HYDRODIURIL) 25 mg tablet TAKE 1 TABLET BY MOUTH EVERY DAY 90 Tab 1    magnesium oxide (MAG-OX) 400 mg tablet Take 1 Tab by mouth daily. 90 Tab 1    telmisartan (MICARDIS) 40 mg tablet Take 1 Tab by mouth daily. 90 Tab 1    atenoloL (TENORMIN) 100 mg tablet TAKE 1 TABLET BY MOUTH EVERY DAY 90 Tab 1    rosuvastatin (CRESTOR) 40 mg tablet TAKE 1 TABLET BY MOUTH EVERY DAY AT NIGHT 90 Tab 1    nitroglycerin (NITROSTAT) 0.4 mg SL tablet 1 Tab by SubLINGual route every five (5) minutes as needed for Chest Pain. 1 Bottle 1    vitamin E acetate (VITAMIN E PO) Take  by mouth.  MULTIVITAMIN PO Take  by mouth.  ascorbic acid, vitamin C, (Vitamin C) 1,000 mg tablet Take  by mouth.  esomeprazole (NEXIUM) 20 mg capsule Take 20 mg by mouth daily.  omega 3-dha-epa-fish oil (FISH OIL) 100-160-1,000 mg cap Take 2 Caps by mouth daily.  ibuprofen (MOTRIN) 800 mg tablet Take 1 Tab by mouth every eight (8) hours as needed for Pain. (Patient taking differently: Take 400 mg by mouth as needed for Pain.) 40 Tab 0    loratadine (CLARITIN) 10 mg tablet Take 10 mg by mouth every other day.  aspirin delayed-release 81 mg tablet Take  by mouth daily.        Allergies   Allergen Reactions    Codeine Nausea and Vomiting       Family History   Problem Relation Age of Onset    Heart Disease Mother     Heart Disease Father         chf in 80s     Social History     Tobacco Use    Smoking status: Former Smoker     Packs/day: 0.50     Years: 1.00     Pack years: 0.50     Types: Cigarettes    Smokeless tobacco: Never Used    Tobacco comment: quit October 2017   Substance Use Topics    Alcohol use:  Yes     Alcohol/week: 3.3 standard drinks     Types: 4 Glasses of wine per week     Comment: occasional

## 2021-01-12 NOTE — PATIENT INSTRUCTIONS
Medicare Wellness Visit, Female The best way to live healthy is to have a lifestyle where you eat a well-balanced diet, exercise regularly, limit alcohol use, and quit all forms of tobacco/nicotine, if applicable. Regular preventive services are another way to keep healthy. Preventive services (vaccines, screening tests, monitoring & exams) can help personalize your care plan, which helps you manage your own care. Screening tests can find health problems at the earliest stages, when they are easiest to treat. [facility] follows the current, evidence-based guidelines published by the [facility] ([facility]) when recommending preventive services for our patients. Because we follow these guidelines, sometimes recommendations change over time as research supports it. (For example, mammograms used to be recommended annually. Even though Medicare will still pay for an annual mammogram, the newer guidelines recommend a mammogram every two years for women of average risk). Of course, you and your doctor may decide to screen more often for some diseases, based on your risk and your co-morbidities (chronic disease you are already diagnosed with). Preventive services for you include: - Medicare offers their members a free annual wellness visit, which is time for you and your primary care provider to discuss and plan for your preventive service needs. Take advantage of this benefit every year! 
-All adults over the age of 72 should receive the recommended pneumonia vaccines. Current USPSTF guidelines recommend a series of two vaccines for the best pneumonia protection.  
-All adults should have a flu vaccine yearly and a tetanus vaccine every 10 years.  
-All adults age 48 and older should receive the shingles vaccines (series of two vaccines). -All adults age 38-68 who are overweight should have a diabetes screening test once every three years. -All adults born between [birth year] and 1964 should be screened once for Hepatitis C. 
-Other screening tests and preventive services for persons with diabetes include: an eye exam to screen for diabetic retinopathy, a kidney function test, a foot exam, and stricter control over your cholesterol.  
-Cardiovascular screening for adults with routine risk involves an electrocardiogram (ECG) at intervals determined by your doctor.  
-Colorectal cancer screenings should be done for adults age 54-65 with no increased risk factors for colorectal cancer. There are a number of acceptable methods of screening for this type of cancer. Each test has its own benefits and drawbacks. Discuss with your doctor what is most appropriate for you during your annual wellness visit. The different tests include: colonoscopy (considered the best screening method), a fecal occult blood test, a fecal DNA test, and sigmoidoscopy. 
 
-A bone mass density test is recommended when a woman turns 65 to screen for osteoporosis. This test is only recommended one time, as a screening. Some providers will use this same test as a disease monitoring tool if you already have osteoporosis. -Breast cancer screenings are recommended every other year for women of normal risk, age 54-69. 
-Cervical cancer screenings for women over age 72 are only recommended with certain risk factors. Here is a list of your current Health Maintenance items (your personalized list of preventive services) with a due date: 
Health Maintenance Due Topic Date Due  
 Hepatitis C Test  1950  
 DTaP/Tdap/Td  (1 - Tdap) 09/25/1972  Shingles Vaccine (1 of 2) 09/25/2001  Colorectal Screening  09/25/2001  Glaucoma Screening   09/25/2016  Bone Mineral Density   09/25/2016  Pneumococcal Vaccine (1 of 1 - PPSV23) 09/25/2016 Joanne Annual Well Visit  12/09/2018  Yearly Flu Vaccine (1) 09/01/2020

## 2021-01-18 ENCOUNTER — PATIENT MESSAGE (OUTPATIENT)
Dept: INTERNAL MEDICINE CLINIC | Age: 71
End: 2021-01-18

## 2021-02-03 ENCOUNTER — HOSPITAL ENCOUNTER (OUTPATIENT)
Dept: BONE DENSITY | Age: 71
Discharge: HOME | End: 2021-02-03
Attending: INTERNAL MEDICINE
Payer: MEDICARE

## 2021-02-03 DIAGNOSIS — Z78.0 POST-MENOPAUSAL: ICD-10-CM

## 2021-02-03 PROCEDURE — 77080 DXA BONE DENSITY AXIAL: CPT

## 2021-03-18 ENCOUNTER — OFFICE VISIT (OUTPATIENT)
Dept: CARDIOLOGY CLINIC | Age: 71
End: 2021-03-18
Payer: MEDICARE

## 2021-03-18 VITALS
HEIGHT: 66 IN | RESPIRATION RATE: 18 BRPM | HEART RATE: 60 BPM | BODY MASS INDEX: 23.78 KG/M2 | DIASTOLIC BLOOD PRESSURE: 60 MMHG | WEIGHT: 148 LBS | SYSTOLIC BLOOD PRESSURE: 120 MMHG | OXYGEN SATURATION: 98 %

## 2021-03-18 DIAGNOSIS — I10 ESSENTIAL HYPERTENSION: ICD-10-CM

## 2021-03-18 DIAGNOSIS — I65.23 BILATERAL CAROTID ARTERY STENOSIS: ICD-10-CM

## 2021-03-18 DIAGNOSIS — E78.00 HYPERCHOLESTEROLEMIA: ICD-10-CM

## 2021-03-18 DIAGNOSIS — Z87.891 EX-SMOKER: ICD-10-CM

## 2021-03-18 DIAGNOSIS — R93.1 AGATSTON CAC SCORE, >400: ICD-10-CM

## 2021-03-18 DIAGNOSIS — I25.10 CORONARY ARTERY DISEASE INVOLVING NATIVE CORONARY ARTERY OF NATIVE HEART WITHOUT ANGINA PECTORIS: Primary | ICD-10-CM

## 2021-03-18 PROCEDURE — G8427 DOCREV CUR MEDS BY ELIG CLIN: HCPCS | Performed by: SPECIALIST

## 2021-03-18 PROCEDURE — 93005 ELECTROCARDIOGRAM TRACING: CPT | Performed by: SPECIALIST

## 2021-03-18 PROCEDURE — G8536 NO DOC ELDER MAL SCRN: HCPCS | Performed by: SPECIALIST

## 2021-03-18 PROCEDURE — G8754 DIAS BP LESS 90: HCPCS | Performed by: SPECIALIST

## 2021-03-18 PROCEDURE — G8420 CALC BMI NORM PARAMETERS: HCPCS | Performed by: SPECIALIST

## 2021-03-18 PROCEDURE — G8399 PT W/DXA RESULTS DOCUMENT: HCPCS | Performed by: SPECIALIST

## 2021-03-18 PROCEDURE — G8752 SYS BP LESS 140: HCPCS | Performed by: SPECIALIST

## 2021-03-18 PROCEDURE — 3017F COLORECTAL CA SCREEN DOC REV: CPT | Performed by: SPECIALIST

## 2021-03-18 PROCEDURE — G0463 HOSPITAL OUTPT CLINIC VISIT: HCPCS | Performed by: SPECIALIST

## 2021-03-18 PROCEDURE — 1101F PT FALLS ASSESS-DOCD LE1/YR: CPT | Performed by: SPECIALIST

## 2021-03-18 PROCEDURE — G9899 SCRN MAM PERF RSLTS DOC: HCPCS | Performed by: SPECIALIST

## 2021-03-18 PROCEDURE — 99214 OFFICE O/P EST MOD 30 MIN: CPT | Performed by: SPECIALIST

## 2021-03-18 PROCEDURE — 1090F PRES/ABSN URINE INCON ASSESS: CPT | Performed by: SPECIALIST

## 2021-03-18 PROCEDURE — 93010 ELECTROCARDIOGRAM REPORT: CPT | Performed by: SPECIALIST

## 2021-03-18 PROCEDURE — G8510 SCR DEP NEG, NO PLAN REQD: HCPCS | Performed by: SPECIALIST

## 2021-03-18 RX ORDER — EZETIMIBE 10 MG/1
10 TABLET ORAL DAILY
Qty: 90 TAB | Refills: 2 | Status: SHIPPED | OUTPATIENT
Start: 2021-03-18 | End: 2021-09-08

## 2021-03-18 NOTE — PROGRESS NOTES
Tracey Saunders     1950       Lior Jhaveri MD, [facility]  Date of Visit-3/18/2021   PCP is Lory Fernando MD   [facility]  [facility]  HPI:  Tracey Saunders is a 72 y.o. female   1 year f/u of CAD. · Cath 6/29/18  LAD proximal 60%. FFR not significant. · Pt has quit smoking since 2018 October. · Calcium score 2,039  · Mild carotid artery disease    Pt saw PCP for MWV. Noted to have stable BP and LDL of 77. Overall the pt states she is doing well. She reports that she only has left arm pain when she is line dancing which she has not been doing secondary to COVID-19. When she exercises on a treadmill she does not have left arm pain. She states that when she exercises on a treadmill her HR does not go above 100. She states that when she is at rest she can hear her heartbeat. She notes she was surprised that her LDL was 77 because it has previously been in the 60's. She is anxious about her heart health and Covid has been tough on her.  and her and very supportive of each other, Her  now being seen for enteric fistula. Denies chest pain, edema, syncope or shortness of breath at rest  + occ tachycardia and heart palpitations       EKG: SR WNL normal axis and intervals     Assessment/Plan:     1. Coronary artery disease involving native coronary artery of native heart without angina pectoris  Left arm pain similar with line dancing but she is doing less of it because of COVID. With the treadmill she does not get it but I don't think the HR is going up as high. Given her LAD lesion we will need to continue close monitoring and I will repeat a stress nuclear. EKG is normal.   - AMB POC EKG ROUTINE W/ 12 LEADS, INTER & REP    2. Hypercholesterolemia  Lipids on high potency statin as appropriate for secondary prevention. LDL has come up about 15 points. We would like to be very aggressive.  We will add Zetia 10 mg and recheck in 4 months. If not improved consider PCSK-9.     3. Essential hypertension  She hears her heart but her pressure is normal. I would continue current BP meds. At goal , meds and possible side effects reviewed and patient denies  Key CAD CHF Meds             ezetimibe (ZETIA) 10 mg tablet (Taking) Take 1 Tab by mouth daily. hydroCHLOROthiazide (HYDRODIURIL) 25 mg tablet (Taking) TAKE 1 TABLET BY MOUTH EVERY DAY    telmisartan (MICARDIS) 40 mg tablet (Taking) Take 1 Tab by mouth daily. atenoloL (TENORMIN) 100 mg tablet (Taking) TAKE 1 TABLET BY MOUTH EVERY DAY    rosuvastatin (CRESTOR) 40 mg tablet (Taking) TAKE 1 TABLET BY MOUTH EVERY DAY AT NIGHT    nitroglycerin (NITROSTAT) 0.4 mg SL tablet (Taking) 1 Tab by SubLINGual route every five (5) minutes as needed for Chest Pain. omega 3-dha-epa-fish oil (FISH OIL) 100-160-1,000 mg cap (Taking) Take 2 Caps by mouth daily. aspirin delayed-release 81 mg tablet (Taking) Take  by mouth daily. BP Readings from Last 6 Encounters:   03/18/21 120/60   01/12/21 124/76   05/27/20 160/84   05/19/20 125/68   03/10/20 120/64   12/06/19 162/69        4. Ex-smoker- yeah! 5. Bilateral carotid artery stenosis  she has some carotid artery disease of less than 30%      Patient Instructions   Please start Zetia 10mg daily. We will see you back in 4 months with an exercise nuclear stress test the patria day as an office visit. Please have your fasting blood work completed about a week prior. Nuclear:    Please arrive 15 minutes early. Wear comfortable clothing and walking or athletic shoes. Do not eat or drink anything, except water, for at least 2 hours prior to your appointment.  Avoid tobacco products for at least 6 hours prior to your test.    Do not eat or drink anything containing caffeine, including but not limited to the following: chocolate, regular and decaffeinated coffee, soft drinks, or tea for at least 12-24 hours prior to your test.    Do hold your scheduled medications prior to your test. Do not take your atenolol 24 hours prior. Your test will be performed on a 1 day protocol. This is determined by your height, weight, and other risk factors For a 1 day test, please allow for 4 hours in the office that day. Future Appointments   Date Time Provider Felisha Espino   7/9/2021  8:00 AM HAI LAWRENCE AMB   7/9/2021 11:40 AM Lior Tran MD [facility] AMB       F/u in 4 months with a stress test and lipids after Zetia     Impression:   1. Coronary artery disease involving native coronary artery of native heart without angina pectoris    2. Agatston CAC score, >400    3. Hypercholesterolemia    4. Essential hypertension    5. Ex-smoker    6. Bilateral carotid artery stenosis       Cardiac History:   Cath 6/29/18  LAD proximal 60%. FFR not significant  CT coronary arteries 05/22/18 - left main ostial heavy calcification and left main distally. LAD heavy calcification proximal distal portion and heavy calcification D1. Left circumflex heavy calcified plaque in the proximal portion. RCA heavy calcified plaque of mid and distal RCA. Calcium score 03/29/18 showed calcium score of 2,039, large amount of calcium in LAD and RCA. Carotid dopplers 02/13/18 showed less than 50% stenosis of right internal and less than 50% of the left internal carotid arteries. CT scan 2015 showed calcium in the left main. CT done for pulmonary nodule previously. Stress echo 04/11/18, Dr. Ignacio Stahl - EKG changes, non diagnostic, walked 5 minutes, no echo changes. ROS-except as noted above. . A complete cardiac and respiratory are reviewed and negative except as above ; Resp-denies wheezing  or productive cough,.  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 Her smoking use included cigarettes. She has a 0.50 pack-year smoking history. She has never used smokeless tobacco. She reports current alcohol use of about 3.3 standard drinks of alcohol per week. She reports that she does not use drugs. Exam and Labs:  /60 (BP 1 Location: Left arm, BP Patient Position: Sitting, BP Cuff Size: Adult)   Pulse 60   Resp 18   Ht 5' 6\" (1.676 m)   Wt 148 lb (67.1 kg)   SpO2 98%   BMI 23.89 kg/m² Constitutional:  NAD, comfortable  Head: NC,AT. Eyes: No scleral icterus. Neck:  Neck supple. No JVD present. Throat: moist mucous membranes. Chest: Effort normal & normal respiratory excursion . Neurological: alert, conversant and oriented . Skin: Skin is not cold. No obvious systemic rash noted. Not diaphoretic. No erythema. Psychiatric:  Grossly normal mood and affect. Behavior appears normal. Extremities:  no clubbing or cyanosis. Abdomen: non distended    Lungs:breath sounds normal. No stridor. distress, wheezes or  Rales. Heart: normal rate, regular rhythm, normal S1, S2, no murmurs, rubs, clicks or gallops , PMI non displaced. Edema: Edema is none.   Lab Results   Component Value Date/Time    Cholesterol, total 160 01/07/2021 12:00 AM    HDL Cholesterol 60 01/07/2021 12:00 AM    LDL, calculated 77 01/07/2021 12:00 AM    LDL, calculated 63 05/14/2020 12:00 AM    Triglyceride 129 01/07/2021 12:00 AM    CHOL/HDL Ratio 3.37 10/06/2014     Lab Results   Component Value Date/Time    Sodium 136 01/07/2021 12:00 AM    Potassium 3.9 01/07/2021 12:00 AM    Chloride 97 01/07/2021 12:00 AM    CO2 24 01/07/2021 12:00 AM    Glucose 94 01/07/2021 12:00 AM    BUN 14 01/07/2021 12:00 AM    Creatinine 0.80 01/07/2021 12:00 AM    BUN/Creatinine ratio 18 01/07/2021 12:00 AM    GFR est AA 87 01/07/2021 12:00 AM    GFR est non-AA 75 01/07/2021 12:00 AM    Calcium 9.4 01/07/2021 12:00 AM      Wt Readings from Last 3 Encounters:   03/18/21 148 lb (67.1 kg)   01/12/21 151 lb (68.5 kg)   05/27/20 148 lb (67.1 kg)      BP Readings from Last 3 Encounters:   03/18/21 120/60   01/12/21 124/76   05/27/20 160/84      Current Outpatient Medications   Medication Sig    ezetimibe (ZETIA) 10 mg tablet Take 1 Tab by mouth daily.  Synthroid 88 mcg tablet Take 1 Tab by mouth Daily (before breakfast).  hydroCHLOROthiazide (HYDRODIURIL) 25 mg tablet TAKE 1 TABLET BY MOUTH EVERY DAY    magnesium oxide (MAG-OX) 400 mg tablet Take 1 Tab by mouth daily.  telmisartan (MICARDIS) 40 mg tablet Take 1 Tab by mouth daily.  atenoloL (TENORMIN) 100 mg tablet TAKE 1 TABLET BY MOUTH EVERY DAY    rosuvastatin (CRESTOR) 40 mg tablet TAKE 1 TABLET BY MOUTH EVERY DAY AT NIGHT    nitroglycerin (NITROSTAT) 0.4 mg SL tablet 1 Tab by SubLINGual route every five (5) minutes as needed for Chest Pain.  vitamin E acetate (VITAMIN E PO) Take  by mouth.  MULTIVITAMIN PO Take  by mouth.  ascorbic acid, vitamin C, (Vitamin C) 1,000 mg tablet Take  by mouth.  esomeprazole (NEXIUM) 20 mg capsule Take 20 mg by mouth daily.  omega 3-dha-epa-fish oil (FISH OIL) 100-160-1,000 mg cap Take 2 Caps by mouth daily.  ibuprofen (MOTRIN) 800 mg tablet Take 1 Tab by mouth every eight (8) hours as needed for Pain. (Patient taking differently: Take 400 mg by mouth as needed for Pain.)    loratadine (CLARITIN) 10 mg tablet Take 10 mg by mouth every other day.  aspirin delayed-release 81 mg tablet Take  by mouth daily. No current facility-administered medications for this visit. Impression see above. Written by Darin Plascencia, as dictated by Xavier Thurston MD.

## 2021-03-18 NOTE — PROGRESS NOTES
Visit Vitals  /60 (BP 1 Location: Left arm, BP Patient Position: Sitting, BP Cuff Size: Adult)   Pulse 60   Resp 18   Ht 5' 6\" (1.676 m)   Wt 148 lb (67.1 kg)   SpO2 98%   BMI 23.89 kg/m²

## 2021-03-18 NOTE — PATIENT INSTRUCTIONS
Please start Zetia 10mg daily. We will see you back in 4 months with an exercise nuclear stress test the patria day as an office visit. Please have your fasting blood work completed about a week prior. Nuclear:    Please arrive 15 minutes early. Wear comfortable clothing and walking or athletic shoes. Do not eat or drink anything, except water, for at least 2 hours prior to your appointment. Avoid tobacco products for at least 6 hours prior to your test.    Do not eat or drink anything containing caffeine, including but not limited to the following: chocolate, regular and decaffeinated coffee, soft drinks, or tea for at least 12-24 hours prior to your test.    Do hold your scheduled medications prior to your test. Do not take your atenolol 24 hours prior. Your test will be performed on a 1 day protocol. This is determined by your height, weight, and other risk factors For a 1 day test, please allow for 4 hours in the office that day.

## 2021-03-18 NOTE — Clinical Note
3/18/2021 Patient: Abeilno Egan YOB: 1950 Date of Visit: 3/18/2021 Paulo Amezquita MD 
[address] [facility] [address] [address] [employer] Dear Paulo Amezquita MD, Thank you for referring Ms. Kimberly Raymundo to [facility] for evaluation. My notes for this consultation are attached. If you have questions, please do not hesitate to call me. I look forward to following your patient along with you.  
 
 
Sincerely, 
 
Rudy Rahman MD

## 2021-03-22 ENCOUNTER — TELEPHONE (OUTPATIENT)
Dept: INTERNAL MEDICINE CLINIC | Age: 71
End: 2021-03-22

## 2021-03-22 NOTE — TELEPHONE ENCOUNTER
----- Message from Fartun De Paz sent at 3/22/2021  1:42 PM EDT -----  Regarding: /Telephone  General Message/Vendor Calls    Caller's first and last name:PT      Reason for call:Would like to speak with Nurse regarding medication discussed via [employer]      Callback required yes/no and why: Yes, to discuss      Best contact number(s):[phone] (home if calling today) or [phone] (cell)      Details to clarify the request:N/A      Fartun De Paz

## 2021-03-22 NOTE — TELEPHONE ENCOUNTER
Patient calling to follow up on her [facility] request for the generic synthroid. Discussed with patient sometimes the MD prefers the brand-name synthroid because it can better absorbed. Patient states she has been on the generic for years & questions why the brand was ordered this time. Advised it could be that her thyroid level was off when last checked so PCP sent in the brand to see if it makes a difference. Patient states the brand cost her $120.00 for a 90 day. Reassured patient it will be fine to go back to the generic. Patient will check with the pharmacy on the cost of her next Rx & if too expensive will send a [employer] message requesting the generic.

## 2021-03-25 DIAGNOSIS — E03.4 HYPOTHYROIDISM DUE TO ACQUIRED ATROPHY OF THYROID: ICD-10-CM

## 2021-03-25 RX ORDER — LEVOTHYROXINE SODIUM 88 UG/1
88 TABLET ORAL
Qty: 90 TAB | Refills: 0 | Status: SHIPPED | OUTPATIENT
Start: 2021-03-25 | End: 2021-06-21

## 2021-03-25 RX ORDER — LEVOTHYROXINE SODIUM 88 UG/1
88 TABLET ORAL
Qty: 90 TAB | Refills: 1 | Status: CANCELLED | OUTPATIENT
Start: 2021-03-25 | End: 2022-03-25

## 2021-04-19 ENCOUNTER — TELEPHONE (OUTPATIENT)
Dept: CARDIAC REHAB | Age: 71
End: 2021-04-19

## 2021-04-19 NOTE — TELEPHONE ENCOUNTER
Left message on VM reminding of nutrition appointment and call back number if any questions or unable to keep appointment.

## 2021-04-20 ENCOUNTER — HOSPITAL ENCOUNTER (OUTPATIENT)
Dept: CARDIAC REHAB | Age: 71
Discharge: HOME | End: 2021-04-20
Attending: SPECIALIST
Payer: MEDICARE

## 2021-04-20 VITALS — WEIGHT: 147.2 LBS | BODY MASS INDEX: 23.66 KG/M2 | HEIGHT: 66 IN

## 2021-04-20 PROCEDURE — 97802 MEDICAL NUTRITION INDIV IN: CPT | Performed by: DIETITIAN, REGISTERED

## 2021-04-20 NOTE — PROGRESS NOTES
[address] NOTE  DATE: 2021      REFERRING PHYSICIAN:  Dr. Josselin Lucas    NAME: Skip Ruiz : 1950 AGE: 72 y.o. GENDER: female    REASON FOR VISIT:  Hypercholesterolemia, CAC score >400    PAST MEDICAL HISTORY:   Patient Active Problem List   Diagnosis Code    HTN (hypertension) I5    Hypothyroid E03.9    Hypercholesterolemia E78.00    Diverticulitis K57.92    Hiatal hernia K44.9    Agatston CAC score, >400 R93.1    Ex-smoker Z87.891    CAD (coronary artery disease), native coronary artery I25.10    Bilateral carotid artery stenosis I65.23       LABS:   Lab Results   Component Value Date/Time    Cholesterol, total 160 2021 12:00 AM    HDL Cholesterol 60 2021 12:00 AM    LDL, calculated 77 2021 12:00 AM    LDL, calculated 63 2020 12:00 AM    VLDL, calculated 23 2021 12:00 AM    VLDL, calculated 41 (H) 2020 12:00 AM    Triglyceride 129 2021 12:00 AM    CHOL/HDL Ratio 3.37 10/06/2014     Lab Results   Component Value Date/Time    Hemoglobin A1c 6.3 (H) 2021 12:00 AM    Hemoglobin A1c, External 6.4 2018       MEDICATIONS/SUPPLEMENTS:   [unfilled]  Prior to Admission medications    Medication Sig Start Date End Date Taking? Authorizing Provider   levothyroxine (SYNTHROID) 88 mcg tablet Take 1 Tab by mouth Daily (before breakfast). 3/25/21   Terrell Overton MD   ezetimibe (ZETIA) 10 mg tablet Take 1 Tab by mouth daily. 3/18/21   Lior Tran MD   hydroCHLOROthiazide (HYDRODIURIL) 25 mg tablet TAKE 1 TABLET BY MOUTH EVERY DAY 21   Terrell Overton MD   magnesium oxide (MAG-OX) 400 mg tablet Take 1 Tab by mouth daily. 20   Terrell Overton MD   telmisartan (MICARDIS) 40 mg tablet Take 1 Tab by mouth daily.  20   Terrell Overton MD   atenoloL (TENORMIN) 100 mg tablet TAKE 1 TABLET BY MOUTH EVERY DAY 20   Terrell Overton MD   rosuvastatin (CRESTOR) 40 mg tablet TAKE 1 TABLET BY MOUTH EVERY DAY AT NIGHT 9/24/20   Ady Graff MD   nitroglycerin (NITROSTAT) 0.4 mg SL tablet 1 Tab by SubLINGual route every five (5) minutes as needed for Chest Pain. 5/28/20   Lior Tran MD   vitamin E acetate (VITAMIN E PO) Take  by mouth. Provider, Historical   MULTIVITAMIN PO Take  by mouth. Provider, Historical   ascorbic acid, vitamin C, (Vitamin C) 1,000 mg tablet Take  by mouth. Provider, Historical   esomeprazole (NEXIUM) 20 mg capsule Take 20 mg by mouth daily. Provider, Historical   omega 3-dha-epa-fish oil (FISH OIL) 100-160-1,000 mg cap Take 2 Caps by mouth daily. Provider, Historical   ibuprofen (MOTRIN) 800 mg tablet Take 1 Tab by mouth every eight (8) hours as needed for Pain. Patient taking differently: Take 400 mg by mouth as needed for Pain. 2/26/16   Juan Carlos Urrutia NP   loratadine (CLARITIN) 10 mg tablet Take 10 mg by mouth every other day. Provider, Historical   aspirin delayed-release 81 mg tablet Take  by mouth daily.     Provider, Historical       EXERCISE/PHYSICAL ACTIVITY:  Delle Mor about once or twice a week and inconsistent, about 7 to 10 times per month; walks on treadmill for 25 minutes and elliptical for 30 minutes and weight circuit; line dances every Saturday in the summer months    ALCOHOL / TOBACCO USE: alcohol intermittently, [place] 3 [place] (light), special occasions vodka with cranberry (no more than 1); quit smoking 2 years ago but may have a cigarette [place]     SOCIAL HISTORY: she is retired and lives with her , they have a 5th wheel [place]: sporadic in the winter but June thru October 2-4 weekends per month    REPORTED WEIGHT HISTORY:  Current weight is usual weight; she will modify diet as needed to help keep weight under 150        ANTHROPOMETRICS:    Ht Readings from Last 1 Encounters:   04/20/21 5' 6\" (1.676 m)      Wt Readings from Last 10 Encounters:   04/20/21 66.8 kg (147 lb 3.2 oz)   03/18/21 67.1 kg (148 lb)   01/12/21 68.5 kg (151 lb)   05/27/20 67.1 kg (148 lb)   05/19/20 67.1 kg (148 lb)   03/10/20 67.9 kg (149 lb 9.6 oz)   12/06/19 67.1 kg (148 lb)   09/17/19 67.6 kg (149 lb)   05/01/19 67.6 kg (149 lb)   03/19/19 67 kg (147 lb 12.8 oz)      IBW:130 # +/- 10%  %IBW: 113 % +/- 10%    BMI: 23.8 kg/M2 Category: Normal or Healthy Weight  Waist: 36.25 inches          NUTRITION ASSESSMENT:    FOOD ALLERGIES/INTOLERANCES: buckwheat    24 HOUR DIET RECALL  Breakfast     Snack     Lunch     Snack     Dinner     Snack       Delia Durant kept some food diaries and these were reviewed in lieu of dietary recall. Food diaries reveal several high saturated food on a regular basis: pizza, cheese, ice cream, cake, chicken wings, potato chips. Typically eats something sweet, like ice cream, at night, sometimes low carb. Feels she eats healthy but too much sugar. She does the majority of the food shopping but her  may on occasion. States she loves vegetables. Does not deep hair. [place] except perhaps when have group meals. She \"sometimes\" reads the food labels, for sugar and carbs and also saturated fat. Restaurant food has decreased since the pandemic. Eats red meat about twice a week. Frequently skips breakfast, no appetite for it. NUTRITION DIAGNOSIS:  Food and nutrition related knowledge deficit related to lack of nutrition education as evidenced by pt request for RD education and counseling. ESTIMATED ENERGY NEEDS:  [id number] (using Cadence Gary with AF 1.35-1.4)    NUTRITION INTERVENTION:  Nutrition 60 minute one-on-one education & goal setting with Delia Durant    Reviewed with Delia Durant relevant labs compared to ideals.     Reviewed weight history and patient's verbalized weight goal as well as any real or perceived barriers to obtaining the goal. Collaborated with patient to set a specific short and long term weight goal.     Conducted a verbal diet recall and assessed for environmental, financial, psychosocial, physical and comorbidities that may impact the food and eating patterns / behaviors of Mary Garza with patient to set specific nutrient goals as well as specific food / behavior changes that will help patient meet the overall goal of: heart healthy diet (reduced saturated fat)    NUTRITION EDUCATION / HANDOUTS PROVIDED:  - Label reading for low saturated fat  - MyPlate or Diabetes Plate  method  - Snack Attach: healthier snack choices  - Academy of Nutrition and Dietetics patient education: \"Cholesterol Lowering Nutrition Therapy\", including recommended food lists  - Academy of Nutrition and Dietetics patient education: Heart Healthy Cooking Tips  - MyPlate: 10 Tips to Build a Healthy Plate      PATIENT GOALS:    Weight Goals:  Waist </= 35 inches        Nutrition Goals:      Saturated Fat: no more than 10.5 g/day  - read and compare food labels  - keep food diary and count saturated fat grams  - choose lower saturated fat desserts; eg Healthy Choice fudge bars, Andrea's slow churned ice cream, icees or sherbet  - choose lower fat cheese (2%) and limit portions    Other:  - oatmeal at least 3 times per week  - meatless Mondays   - switch to 100% whole wheat breads, pastas and crackers      Exercise Goals:  - cardio exercise at least 3 times per week; no more than 2 days off in a row          Specific tips and techniques to facilitate compliance with above recommendations were provided and discussed. Shree Guerra verbalized understanding. The patient was encouraged to contact me as needed.       Follow-up: as needed              Renu Collado RD, [facility]

## 2021-05-23 ENCOUNTER — PATIENT MESSAGE (OUTPATIENT)
Dept: INTERNAL MEDICINE CLINIC | Age: 71
End: 2021-05-23

## 2021-05-24 NOTE — TELEPHONE ENCOUNTER
From: Baetriz Rosales  To: Van Hill MD  Sent: 5/23/2021 2:20 PM EDT  Subject: Non-Urgent Medical Question    Good Afternoon Dr Dave Chun  I just wanted you to know that I haven't forgotten about my thyroid blood work but Daniel Yang has been in the [place] [id number] having several surgeries so I haven't had time. I do plan on getting it done in the next week or two as he is coming home hopefully Wednesday.   Thanks  Malika Guillaume

## 2021-05-26 DIAGNOSIS — E78.5 DYSLIPIDEMIA, GOAL LDL BELOW 70: ICD-10-CM

## 2021-05-26 DIAGNOSIS — I10 ESSENTIAL HYPERTENSION: ICD-10-CM

## 2021-05-26 RX ORDER — ROSUVASTATIN CALCIUM 40 MG/1
TABLET, COATED ORAL
Qty: 90 TABLET | Refills: 1 | Status: SHIPPED | OUTPATIENT
Start: 2021-05-26 | End: 2021-12-12

## 2021-05-26 RX ORDER — ATENOLOL 100 MG/1
TABLET ORAL
Qty: 90 TABLET | Refills: 1 | Status: SHIPPED | OUTPATIENT
Start: 2021-05-26 | End: 2021-08-31 | Stop reason: SDUPTHER

## 2021-06-17 ENCOUNTER — HOSPITAL ENCOUNTER (OUTPATIENT)
Dept: LAB | Age: 71
Discharge: HOME | End: 2021-06-17
Payer: MEDICARE

## 2021-06-17 PROCEDURE — 84439 ASSAY OF FREE THYROXINE: CPT

## 2021-06-17 PROCEDURE — 84443 ASSAY THYROID STIM HORMONE: CPT

## 2021-06-18 LAB
T4 FREE SERPL-MCNC: 1.57 NG/DL (ref 0.82–1.77)
TSH SERPL DL<=0.005 MIU/L-ACNC: 1.25 UIU/ML (ref 0.45–4.5)

## 2021-06-20 DIAGNOSIS — I10 ESSENTIAL HYPERTENSION WITH GOAL BLOOD PRESSURE LESS THAN 130/80: ICD-10-CM

## 2021-06-21 RX ORDER — LEVOTHYROXINE SODIUM 88 UG/1
TABLET ORAL
Qty: 90 TABLET | Refills: 0 | Status: SHIPPED | OUTPATIENT
Start: 2021-06-21 | End: 2021-09-29 | Stop reason: SDUPTHER

## 2021-06-21 RX ORDER — TELMISARTAN 40 MG/1
TABLET ORAL
Qty: 90 TABLET | Refills: 1 | Status: SHIPPED | OUTPATIENT
Start: 2021-06-21 | End: 2021-12-12

## 2021-07-07 DIAGNOSIS — I10 HTN, GOAL BELOW 130/80: ICD-10-CM

## 2021-07-07 RX ORDER — HYDROCHLOROTHIAZIDE 25 MG/1
25 TABLET ORAL DAILY
Qty: 90 TABLET | Refills: 1 | Status: SHIPPED | OUTPATIENT
Start: 2021-07-07 | End: 2022-01-06

## 2021-07-16 LAB
ALBUMIN SERPL-MCNC: 5.1 G/DL (ref 3.8–4.8)
ALP SERPL-CCNC: 66 IU/L (ref 48–121)
ALT SERPL-CCNC: 26 IU/L (ref 0–32)
AST SERPL-CCNC: 24 IU/L (ref 0–40)
BILIRUB DIRECT SERPL-MCNC: 0.15 MG/DL (ref 0–0.4)
BILIRUB SERPL-MCNC: 0.5 MG/DL (ref 0–1.2)
CHOLEST SERPL-MCNC: 143 MG/DL (ref 100–199)
HDLC SERPL-MCNC: 63 MG/DL
IMP & REVIEW OF LAB RESULTS: NORMAL
LDLC SERPL CALC-MCNC: 54 MG/DL (ref 0–99)
PROT SERPL-MCNC: 6.7 G/DL (ref 6–8.5)
TRIGL SERPL-MCNC: 158 MG/DL (ref 0–149)
VLDLC SERPL CALC-MCNC: 26 MG/DL (ref 5–40)

## 2021-07-26 ENCOUNTER — OFFICE VISIT (OUTPATIENT)
Dept: CARDIOLOGY CLINIC | Age: 71
End: 2021-07-26

## 2021-07-26 ENCOUNTER — ANCILLARY PROCEDURE (OUTPATIENT)
Dept: CARDIOLOGY CLINIC | Age: 71
End: 2021-07-26
Payer: MEDICARE

## 2021-07-26 VITALS
HEIGHT: 66 IN | BODY MASS INDEX: 23.63 KG/M2 | SYSTOLIC BLOOD PRESSURE: 136 MMHG | DIASTOLIC BLOOD PRESSURE: 70 MMHG | WEIGHT: 147 LBS

## 2021-07-26 VITALS
RESPIRATION RATE: 15 BRPM | DIASTOLIC BLOOD PRESSURE: 70 MMHG | SYSTOLIC BLOOD PRESSURE: 136 MMHG | BODY MASS INDEX: 23.63 KG/M2 | HEIGHT: 66 IN | WEIGHT: 147 LBS | OXYGEN SATURATION: 99 % | HEART RATE: 66 BPM

## 2021-07-26 DIAGNOSIS — E78.00 HYPERCHOLESTEROLEMIA: ICD-10-CM

## 2021-07-26 DIAGNOSIS — Z87.891 EX-SMOKER: ICD-10-CM

## 2021-07-26 DIAGNOSIS — I65.23 BILATERAL CAROTID ARTERY STENOSIS: ICD-10-CM

## 2021-07-26 DIAGNOSIS — I10 ESSENTIAL HYPERTENSION: ICD-10-CM

## 2021-07-26 DIAGNOSIS — I25.10 CORONARY ARTERY DISEASE INVOLVING NATIVE CORONARY ARTERY OF NATIVE HEART WITHOUT ANGINA PECTORIS: ICD-10-CM

## 2021-07-26 DIAGNOSIS — I25.10 CORONARY ARTERY DISEASE INVOLVING NATIVE CORONARY ARTERY OF NATIVE HEART WITHOUT ANGINA PECTORIS: Primary | ICD-10-CM

## 2021-07-26 DIAGNOSIS — R93.1 AGATSTON CAC SCORE, >400: ICD-10-CM

## 2021-07-26 LAB
STRESS ANGINA INDEX: 0
STRESS BASELINE DIAS BP: 70 MMHG
STRESS BASELINE HR: 68 BPM
STRESS BASELINE SYS BP: 136 MMHG
STRESS ESTIMATED WORKLOAD: 7 METS
STRESS EXERCISE DUR MIN: NORMAL
STRESS O2 SAT PEAK: 100 %
STRESS O2 SAT REST: 99 %
STRESS PEAK DIAS BP: 78 MMHG
STRESS PEAK SYS BP: 162 MMHG
STRESS PERCENT HR ACHIEVED: 86 %
STRESS POST PEAK HR: 130 BPM
STRESS RATE PRESSURE PRODUCT: NORMAL BPM*MMHG
STRESS ST DEPRESSION: 0 MM
STRESS ST ELEVATION: 0 MM
STRESS TARGET HR: 151 BPM

## 2021-07-26 PROCEDURE — G8752 SYS BP LESS 140: HCPCS | Performed by: SPECIALIST

## 2021-07-26 PROCEDURE — 93016 CV STRESS TEST SUPVJ ONLY: CPT | Performed by: SPECIALIST

## 2021-07-26 PROCEDURE — 99214 OFFICE O/P EST MOD 30 MIN: CPT | Performed by: SPECIALIST

## 2021-07-26 PROCEDURE — 93018 CV STRESS TEST I&R ONLY: CPT | Performed by: SPECIALIST

## 2021-07-26 PROCEDURE — G8420 CALC BMI NORM PARAMETERS: HCPCS | Performed by: SPECIALIST

## 2021-07-26 PROCEDURE — 93017 CV STRESS TEST TRACING ONLY: CPT | Performed by: SPECIALIST

## 2021-07-26 PROCEDURE — G0463 HOSPITAL OUTPT CLINIC VISIT: HCPCS | Performed by: SPECIALIST

## 2021-07-26 PROCEDURE — G9899 SCRN MAM PERF RSLTS DOC: HCPCS | Performed by: SPECIALIST

## 2021-07-26 PROCEDURE — 3017F COLORECTAL CA SCREEN DOC REV: CPT | Performed by: SPECIALIST

## 2021-07-26 PROCEDURE — 1101F PT FALLS ASSESS-DOCD LE1/YR: CPT | Performed by: SPECIALIST

## 2021-07-26 PROCEDURE — 78452 HT MUSCLE IMAGE SPECT MULT: CPT | Performed by: SPECIALIST

## 2021-07-26 PROCEDURE — 1090F PRES/ABSN URINE INCON ASSESS: CPT | Performed by: SPECIALIST

## 2021-07-26 PROCEDURE — G8510 SCR DEP NEG, NO PLAN REQD: HCPCS | Performed by: SPECIALIST

## 2021-07-26 PROCEDURE — G8427 DOCREV CUR MEDS BY ELIG CLIN: HCPCS | Performed by: SPECIALIST

## 2021-07-26 PROCEDURE — G8399 PT W/DXA RESULTS DOCUMENT: HCPCS | Performed by: SPECIALIST

## 2021-07-26 PROCEDURE — G8536 NO DOC ELDER MAL SCRN: HCPCS | Performed by: SPECIALIST

## 2021-07-26 PROCEDURE — G8754 DIAS BP LESS 90: HCPCS | Performed by: SPECIALIST

## 2021-07-26 PROCEDURE — A9500 TC99M SESTAMIBI: HCPCS | Performed by: SPECIALIST

## 2021-07-26 RX ORDER — TETRAKIS(2-METHOXYISOBUTYLISOCYANIDE)COPPER(I) TETRAFLUOROBORATE 1 MG/ML
30 INJECTION, POWDER, LYOPHILIZED, FOR SOLUTION INTRAVENOUS ONCE
Status: COMPLETED | OUTPATIENT
Start: 2021-07-26 | End: 2021-07-26

## 2021-07-26 RX ORDER — TETRAKIS(2-METHOXYISOBUTYLISOCYANIDE)COPPER(I) TETRAFLUOROBORATE 1 MG/ML
10 INJECTION, POWDER, LYOPHILIZED, FOR SOLUTION INTRAVENOUS ONCE
Status: COMPLETED | OUTPATIENT
Start: 2021-07-26 | End: 2021-07-26

## 2021-07-26 RX ORDER — MELATONIN
1000 DAILY
COMMUNITY

## 2021-07-26 RX ADMIN — TETRAKIS(2-METHOXYISOBUTYLISOCYANIDE)COPPER(I) TETRAFLUOROBORATE 8.5 MILLICURIE: 1 INJECTION, POWDER, LYOPHILIZED, FOR SOLUTION INTRAVENOUS at 10:40

## 2021-07-26 RX ADMIN — TECHNETIUM TC 99M SESTAMIBI 25.8 MILLICURIE: 1 INJECTION INTRAVENOUS at 12:05

## 2021-07-26 NOTE — PROGRESS NOTES
Kong Gilbert     1950       Lior Awan MD, [facility]  Date of Visit-7/26/2021   PCP is Good Brandon MD   [facility]  [facility]  HPI:  Kong Gilbert is a 72 y.o. female   2 month f/u of CAD. · Cath 6/29/18  LAD proximal 60%.  FFR not significant.     · Pt has quit smoking since 2018 October. · Calcium score 2,039  · Mild carotid artery disease     Pt saw PCP for MWV. Noted to have stable BP and LDL of 77.      Stress test today is normal. MPI is normal at 5:54 minutes. Overall the pt states she is doing well. EKG- NSR 66 within normal limits     Denies chest pain, edema, syncope or shortness of breath at rest, has no tachycardia, palpitations or sense of arrhythmia. Assessment/Plan:     Patient Instructions   We will see you back for an annual follow up. 1. Coronary artery disease involving native coronary artery of native heart without angina pectoris  Left arm pain is resolved. Nuclear todayis normal. Prior LAD 60% lesion 3 years ago. FFR was negative. High calcium score. 07/26/21    NUCLEAR CARDIAC STRESS TEST 07/26/2021 7/28/2021    Interpretation Summary  · SPECT: Left ventricular function post-stress was normal. Calculated ejection fraction is 71%. There is no evidence of transient ischemic dilation (TID). The TID ratio is 1.  · Baseline ECG: Normal sinus rhythm, non-specific ST-T wave abnormalities. · SPECT: Myocardial perfusion imaging supports a low risk stress test.    Signed by: Korin Ro MD on 7/26/2021  4:09 PM     2. Agatston CAC score, >400  Added Zetia, I am very happy with LDL of 54. We should continue to be aggressive. 3. Hypercholesterolemia  Stable.    At goal , denies excess muscle aches or new liver issues  Key Antihyperlipidemia Meds             rosuvastatin (CRESTOR) 40 mg tablet (Taking) TAKE 1 TABLET BY MOUTH EVERY DAY AT NIGHT    ezetimibe (ZETIA) 10 mg tablet (Taking) Take 1 Tab by mouth daily.    omega 3-dha-epa-fish oil (FISH OIL) 100-160-1,000 mg cap (Taking) Take 2 Caps by mouth daily. Lab Results   Component Value Date/Time    LDL, calculated 54 07/15/2021 12:00 AM    LDL, calculated 63 05/14/2020 12:00 AM       4. Essential hypertension  She hears her heart but her pressure is normal. I would continue current BP meds. 05/27/20    ECHO STRESS 05/28/2020 6/16/2020    Interpretation Summary  · Baseline ECG: Normal sinus rhythm, non-specific ST-T wave abnormalities. · Moderate risk [facility] treadmill score. · Normal stress echocardiogram. Low risk study. Signed by: Korin Ro MD on 5/28/2020  3:10 PM      At goal , meds and possible side effects reviewed and patient denies  Key CAD CHF Meds             hydroCHLOROthiazide (HYDRODIURIL) 25 mg tablet (Taking) Take 1 Tablet by mouth daily. telmisartan (MICARDIS) 40 mg tablet (Taking) TAKE 1 TABLET BY MOUTH EVERY DAY    rosuvastatin (CRESTOR) 40 mg tablet (Taking) TAKE 1 TABLET BY MOUTH EVERY DAY AT NIGHT    atenoloL (TENORMIN) 100 mg tablet (Taking) TAKE 1 TABLET BY MOUTH EVERY DAY    ezetimibe (ZETIA) 10 mg tablet (Taking) Take 1 Tab by mouth daily. nitroglycerin (NITROSTAT) 0.4 mg SL tablet (Taking) 1 Tab by SubLINGual route every five (5) minutes as needed for Chest Pain. omega 3-dha-epa-fish oil (FISH OIL) 100-160-1,000 mg cap (Taking) Take 2 Caps by mouth daily. aspirin delayed-release 81 mg tablet (Taking) Take  by mouth daily. BP Readings from Last 6 Encounters:   07/26/21 136/70   07/26/21 136/70   03/18/21 120/60   01/12/21 124/76   05/27/20 160/84   05/19/20 125/68      5. Ex-smoker  Yeah!    6. Bilateral carotid artery stenosis  she has some carotid artery disease of less than 30%       F/u in 1 year     Impression:   1. Coronary artery disease involving native coronary artery of native heart without angina pectoris    2. Agatston CAC score, >400    3. Hypercholesterolemia    4.  Essential hypertension 5. Ex-smoker    6. Bilateral carotid artery stenosis       Cardiac History:   Cath 6/29/18  LAD proximal 60%. FFR not significant  CT coronary arteries 05/22/18 - left main ostial heavy calcification and left main distally. LAD heavy calcification proximal distal portion and heavy calcification D1. Left circumflex heavy calcified plaque in the proximal portion. RCA heavy calcified plaque of mid and distal RCA. Calcium score 03/29/18 showed calcium score of 2,039, large amount of calcium in LAD and RCA. Carotid dopplers 02/13/18 showed less than 50% stenosis of right internal and less than 50% of the left internal carotid arteries. CT scan 2015 showed calcium in the left main. CT done for pulmonary nodule previously. Stress echo 04/11/18, Dr. Marilyn Fernandez - EKG changes, non diagnostic, walked 5 minutes, no echo changes. Future Appointments   Date Time Provider Felisha Espino   5/16/2022 11:00 AM Lior Tran, MD [facility]        ROS-except as noted above. . A complete cardiac and respiratory are reviewed and negative except as above ; Resp-denies wheezing  or productive cough,. 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 Her smoking use included cigarettes. She has a 0.50 pack-year smoking history.  She has never used smokeless tobacco. She reports current alcohol use of about 3.3 standard drinks of alcohol per week. She reports that she does not use drugs. Exam and Labs:  /70 (BP 1 Location: Right upper arm, BP Patient Position: Sitting, BP Cuff Size: Adult)   Pulse 66   Resp 15   Ht 5' 6\" (1.676 m)   Wt 147 lb (66.7 kg)   SpO2 99%   BMI 23.73 kg/m² Constitutional:  NAD, comfortable  Head: NC,AT. Eyes: No scleral icterus. Neck:  Neck supple. No JVD present. Throat: moist mucous membranes. Chest: Effort normal & normal respiratory excursion . Neurological: alert, conversant and oriented . Skin: Skin is not cold. No obvious systemic rash noted. Not diaphoretic. No erythema. Psychiatric:  Grossly normal mood and affect. Behavior appears normal. Extremities:  no clubbing or cyanosis. Abdomen: non distended    Lungs:breath sounds normal. No stridor. distress, wheezes or  Rales. Heart: normal rate, regular rhythm, normal S1, S2, no murmurs, rubs, clicks or gallops , PMI non displaced. Edema: Edema is none.   Lab Results   Component Value Date/Time    Cholesterol, total 143 07/15/2021 12:00 AM    HDL Cholesterol 63 07/15/2021 12:00 AM    LDL, calculated 54 07/15/2021 12:00 AM    LDL, calculated 63 05/14/2020 12:00 AM    Triglyceride 158 (H) 07/15/2021 12:00 AM    CHOL/HDL Ratio 3.37 10/06/2014 12:00 AM     Lab Results   Component Value Date/Time    Sodium 136 01/07/2021 12:00 AM    Potassium 3.9 01/07/2021 12:00 AM    Chloride 97 01/07/2021 12:00 AM    CO2 24 01/07/2021 12:00 AM    Glucose 94 01/07/2021 12:00 AM    BUN 14 01/07/2021 12:00 AM    Creatinine 0.80 01/07/2021 12:00 AM    BUN/Creatinine ratio 18 01/07/2021 12:00 AM    GFR est AA 87 01/07/2021 12:00 AM    GFR est non-AA 75 01/07/2021 12:00 AM    Calcium 9.4 01/07/2021 12:00 AM      Wt Readings from Last 3 Encounters:   07/26/21 147 lb (66.7 kg)   07/26/21 147 lb (66.7 kg)   04/20/21 147 lb 3.2 oz (66.8 kg)      BP Readings from Last 3 Encounters:   07/26/21 136/70   07/26/21 136/70   03/18/21 120/60      Current Outpatient Medications   Medication Sig    cholecalciferol (Vitamin D3) (1000 Units /25 mcg) tablet Take 1,000 Units by mouth daily.  hydroCHLOROthiazide (HYDRODIURIL) 25 mg tablet Take 1 Tablet by mouth daily.  levothyroxine (SYNTHROID) 88 mcg tablet TAKE 1 TABLET BY MOUTH EVERY DAY BEFORE BREAKFAST    telmisartan (MICARDIS) 40 mg tablet TAKE 1 TABLET BY MOUTH EVERY DAY    rosuvastatin (CRESTOR) 40 mg tablet TAKE 1 TABLET BY MOUTH EVERY DAY AT NIGHT    atenoloL (TENORMIN) 100 mg tablet TAKE 1 TABLET BY MOUTH EVERY DAY    ezetimibe (ZETIA) 10 mg tablet Take 1 Tab by mouth daily.  magnesium oxide (MAG-OX) 400 mg tablet Take 1 Tab by mouth daily.  nitroglycerin (NITROSTAT) 0.4 mg SL tablet 1 Tab by SubLINGual route every five (5) minutes as needed for Chest Pain.  vitamin E acetate (VITAMIN E PO) Take  by mouth.  MULTIVITAMIN PO Take 1 Tablet by mouth daily.  ascorbic acid, vitamin C, (Vitamin C) 1,000 mg tablet Take 2,000 mg by mouth daily.  esomeprazole (NEXIUM) 20 mg capsule Take 20 mg by mouth daily.  omega 3-dha-epa-fish oil (FISH OIL) 100-160-1,000 mg cap Take 2 Caps by mouth daily.  ibuprofen (MOTRIN) 800 mg tablet Take 1 Tab by mouth every eight (8) hours as needed for Pain. (Patient taking differently: Take 400 mg by mouth as needed for Pain.)    loratadine (CLARITIN) 10 mg tablet Take 10 mg by mouth as needed.  aspirin delayed-release 81 mg tablet Take  by mouth daily. No current facility-administered medications for this visit. Impression see above.       Written by Jesica Chaparro, as dictated by Dot Crigler, MD.

## 2021-07-26 NOTE — Clinical Note
7/26/2021    Patient: Tone Armstrong   YOB: 1950   Date of Visit: 7/26/2021     [address] [facility]  [address]  [address] [facility]  Via In Basket    Dear Sadiq Castillo MD,      Thank you for referring Ms. Wang Valles to [facility] for evaluation. My notes for this consultation are attached. If you have questions, please do not hesitate to call me. I look forward to following your patient along with you.       Sincerely,    Ignacio Brown MD

## 2021-08-31 DIAGNOSIS — I10 ESSENTIAL HYPERTENSION: ICD-10-CM

## 2021-08-31 RX ORDER — ATENOLOL 100 MG/1
100 TABLET ORAL DAILY
Qty: 90 TABLET | Refills: 0 | Status: SHIPPED | OUTPATIENT
Start: 2021-08-31 | End: 2021-11-23

## 2021-09-03 DIAGNOSIS — E78.00 HYPERCHOLESTEROLEMIA: ICD-10-CM

## 2021-09-03 DIAGNOSIS — R93.1 AGATSTON CAC SCORE, >400: ICD-10-CM

## 2021-09-03 DIAGNOSIS — I25.10 CORONARY ARTERY DISEASE INVOLVING NATIVE CORONARY ARTERY OF NATIVE HEART WITHOUT ANGINA PECTORIS: ICD-10-CM

## 2021-09-08 RX ORDER — EZETIMIBE 10 MG/1
10 TABLET ORAL DAILY
Qty: 90 TABLET | Refills: 3 | Status: SHIPPED | OUTPATIENT
Start: 2021-09-08 | End: 2022-09-02 | Stop reason: SDUPTHER

## 2021-09-08 NOTE — TELEPHONE ENCOUNTER
Per VO by MD.     Future Appointments   Date Time Provider Felisha Kenzie   5/16/2022 11:00 AM MD DARYA Wick AMB

## 2021-09-29 RX ORDER — LEVOTHYROXINE SODIUM 88 UG/1
88 TABLET ORAL
Qty: 90 TABLET | Refills: 0 | Status: SHIPPED | OUTPATIENT
Start: 2021-09-29 | End: 2021-12-23

## 2021-11-03 ENCOUNTER — OFFICE VISIT (OUTPATIENT)
Dept: INTERNAL MEDICINE CLINIC | Age: 71
End: 2021-11-03
Payer: MEDICARE

## 2021-11-03 VITALS
TEMPERATURE: 98.2 F | BODY MASS INDEX: 23.5 KG/M2 | DIASTOLIC BLOOD PRESSURE: 64 MMHG | WEIGHT: 146.2 LBS | HEART RATE: 62 BPM | HEIGHT: 66 IN | OXYGEN SATURATION: 100 % | SYSTOLIC BLOOD PRESSURE: 116 MMHG | RESPIRATION RATE: 16 BRPM

## 2021-11-03 DIAGNOSIS — I10 HTN, GOAL BELOW 130/80: Primary | ICD-10-CM

## 2021-11-03 DIAGNOSIS — E03.4 HYPOTHYROIDISM DUE TO ACQUIRED ATROPHY OF THYROID: ICD-10-CM

## 2021-11-03 DIAGNOSIS — R73.01 IFG (IMPAIRED FASTING GLUCOSE): ICD-10-CM

## 2021-11-03 DIAGNOSIS — R93.1 AGATSTON CAC SCORE, >400: ICD-10-CM

## 2021-11-03 DIAGNOSIS — E78.5 DYSLIPIDEMIA, GOAL LDL BELOW 70: ICD-10-CM

## 2021-11-03 PROCEDURE — G9899 SCRN MAM PERF RSLTS DOC: HCPCS | Performed by: INTERNAL MEDICINE

## 2021-11-03 PROCEDURE — G8420 CALC BMI NORM PARAMETERS: HCPCS | Performed by: INTERNAL MEDICINE

## 2021-11-03 PROCEDURE — 3017F COLORECTAL CA SCREEN DOC REV: CPT | Performed by: INTERNAL MEDICINE

## 2021-11-03 PROCEDURE — G0463 HOSPITAL OUTPT CLINIC VISIT: HCPCS | Performed by: INTERNAL MEDICINE

## 2021-11-03 PROCEDURE — G8399 PT W/DXA RESULTS DOCUMENT: HCPCS | Performed by: INTERNAL MEDICINE

## 2021-11-03 PROCEDURE — G8427 DOCREV CUR MEDS BY ELIG CLIN: HCPCS | Performed by: INTERNAL MEDICINE

## 2021-11-03 PROCEDURE — G8752 SYS BP LESS 140: HCPCS | Performed by: INTERNAL MEDICINE

## 2021-11-03 PROCEDURE — G8754 DIAS BP LESS 90: HCPCS | Performed by: INTERNAL MEDICINE

## 2021-11-03 PROCEDURE — 1090F PRES/ABSN URINE INCON ASSESS: CPT | Performed by: INTERNAL MEDICINE

## 2021-11-03 PROCEDURE — G8536 NO DOC ELDER MAL SCRN: HCPCS | Performed by: INTERNAL MEDICINE

## 2021-11-03 PROCEDURE — 99214 OFFICE O/P EST MOD 30 MIN: CPT | Performed by: INTERNAL MEDICINE

## 2021-11-03 PROCEDURE — G8432 DEP SCR NOT DOC, RNG: HCPCS | Performed by: INTERNAL MEDICINE

## 2021-11-03 PROCEDURE — 1101F PT FALLS ASSESS-DOCD LE1/YR: CPT | Performed by: INTERNAL MEDICINE

## 2021-11-03 NOTE — PROGRESS NOTES
HISTORY OF PRESENT ILLNESS  Amena Edgar is a 80 y.o. female. JAI Bey is seen for med check. Since our last visit,  Becky Matthews has had an amputation and she has been his caregiver. Issues:  1. Thyroid, on 88 mcg. No symptoms of hypo or hyperthyroidism. Weight is stable. No diarrhea. 2. Hypertension and coronary disease, well controlled. Recently saw Dr. Carter Zuniga and got a good report. 3. Dyslipidemia. Remains on Rosuvastatin with Zetia, which she tolerates. No significant myalgias. 4. She has had her COVID vaccine, will get us the dates. Discussed flu, shingles and Pneumovax and she declines today. Review of Systems   Constitutional: Positive for malaise/fatigue. Negative for chills, fever and weight loss. Respiratory: Negative for cough, shortness of breath and wheezing. Cardiovascular: Negative for chest pain, palpitations, orthopnea, leg swelling and PND. Gastrointestinal: Negative for abdominal pain, diarrhea, heartburn, nausea and vomiting. Musculoskeletal: Negative for myalgias. Neurological: Negative for dizziness and headaches. Physical Exam  Vitals and nursing note reviewed. Constitutional:       Appearance: She is well-developed. HENT:      Head: Normocephalic and atraumatic. Neck:      Thyroid: No thyromegaly. Vascular: No carotid bruit. Cardiovascular:      Rate and Rhythm: Normal rate and regular rhythm. Heart sounds: Normal heart sounds, S1 normal and S2 normal. No murmur heard. Pulmonary:      Effort: Pulmonary effort is normal. No respiratory distress. Breath sounds: Normal breath sounds. No wheezing or rales. Musculoskeletal:      Cervical back: Normal range of motion and neck supple. Neurological:      Mental Status: She is alert and oriented to person, place, and time.       Coordination: Coordination normal.   Psychiatric:         Behavior: Behavior normal.         ASSESSMENT and PLAN  Diagnoses and all orders for this visit:    1. HTN, goal below 130/80-stable on meds  2. Hypothyroidism due to acquired atrophy of thyroid  -     TSH 3RD GENERATION; Future  -     T4, FREE; Future    3. Agatston CAC score, >400-cont with cardiology and cont statin  4. IFG (impaired fasting glucose)  -     HEMOGLOBIN A1C WITH EAG; Future    5. Dyslipidemia, goal LDL below 70  -     METABOLIC PANEL, COMPREHENSIVE; Future  -     LIPID PANEL; Future    The patient is asked to make an attempt to improve diet and exercise patterns to aid in medical management of this problem.   Encouraged pvax

## 2021-11-18 LAB
ALBUMIN SERPL-MCNC: 4.6 G/DL (ref 3.8–4.8)
ALBUMIN/GLOB SERPL: 2.3 {RATIO} (ref 1.2–2.2)
ALP SERPL-CCNC: 63 IU/L (ref 44–121)
ALT SERPL-CCNC: 27 IU/L (ref 0–32)
AST SERPL-CCNC: 27 IU/L (ref 0–40)
BILIRUB SERPL-MCNC: 0.5 MG/DL (ref 0–1.2)
BUN SERPL-MCNC: 15 MG/DL (ref 8–27)
BUN/CREAT SERPL: 20 (ref 12–28)
CALCIUM SERPL-MCNC: 9.6 MG/DL (ref 8.7–10.3)
CHLORIDE SERPL-SCNC: 101 MMOL/L (ref 96–106)
CHOLEST SERPL-MCNC: 117 MG/DL (ref 100–199)
CO2 SERPL-SCNC: 23 MMOL/L (ref 20–29)
CREAT SERPL-MCNC: 0.74 MG/DL (ref 0.57–1)
EST. AVERAGE GLUCOSE BLD GHB EST-MCNC: 140 MG/DL
GLOBULIN SER CALC-MCNC: 2 G/DL (ref 1.5–4.5)
GLUCOSE SERPL-MCNC: 100 MG/DL (ref 65–99)
HBA1C MFR BLD: 6.5 % (ref 4.8–5.6)
HDLC SERPL-MCNC: 56 MG/DL
IMP & REVIEW OF LAB RESULTS: NORMAL
LDLC SERPL CALC-MCNC: 44 MG/DL (ref 0–99)
POTASSIUM SERPL-SCNC: 4.1 MMOL/L (ref 3.5–5.2)
PROT SERPL-MCNC: 6.6 G/DL (ref 6–8.5)
SODIUM SERPL-SCNC: 140 MMOL/L (ref 134–144)
SPECIMEN STATUS REPORT, ROLRST: NORMAL
T4 FREE SERPL-MCNC: 1.55 NG/DL (ref 0.82–1.77)
TRIGL SERPL-MCNC: 90 MG/DL (ref 0–149)
TSH SERPL DL<=0.005 MIU/L-ACNC: 0.65 UIU/ML (ref 0.45–4.5)
VLDLC SERPL CALC-MCNC: 17 MG/DL (ref 5–40)

## 2021-11-23 DIAGNOSIS — I10 ESSENTIAL HYPERTENSION: ICD-10-CM

## 2021-11-23 RX ORDER — ATENOLOL 100 MG/1
TABLET ORAL
Qty: 90 TABLET | Refills: 2 | Status: SHIPPED | OUTPATIENT
Start: 2021-11-23 | End: 2022-05-24

## 2021-12-12 DIAGNOSIS — E78.5 DYSLIPIDEMIA, GOAL LDL BELOW 70: ICD-10-CM

## 2021-12-12 DIAGNOSIS — I10 ESSENTIAL HYPERTENSION WITH GOAL BLOOD PRESSURE LESS THAN 130/80: ICD-10-CM

## 2021-12-12 RX ORDER — ROSUVASTATIN CALCIUM 40 MG/1
TABLET, COATED ORAL
Qty: 90 TABLET | Refills: 1 | Status: SHIPPED | OUTPATIENT
Start: 2021-12-12 | End: 2022-06-10

## 2021-12-12 RX ORDER — TELMISARTAN 40 MG/1
TABLET ORAL
Qty: 90 TABLET | Refills: 1 | Status: SHIPPED | OUTPATIENT
Start: 2021-12-12 | End: 2022-06-10

## 2021-12-13 RX ORDER — ROSUVASTATIN CALCIUM 40 MG/1
40 TABLET, COATED ORAL
Qty: 90 TABLET | Refills: 1 | OUTPATIENT
Start: 2021-12-13

## 2021-12-13 RX ORDER — TELMISARTAN 40 MG/1
40 TABLET ORAL DAILY
Qty: 90 TABLET | Refills: 1 | OUTPATIENT
Start: 2021-12-13

## 2021-12-23 RX ORDER — LEVOTHYROXINE SODIUM 88 UG/1
TABLET ORAL
Qty: 90 TABLET | Refills: 0 | Status: SHIPPED | OUTPATIENT
Start: 2021-12-23 | End: 2022-03-21 | Stop reason: SDUPTHER

## 2022-01-06 ENCOUNTER — PATIENT MESSAGE (OUTPATIENT)
Dept: INTERNAL MEDICINE CLINIC | Age: 72
End: 2022-01-06

## 2022-01-06 DIAGNOSIS — I10 HTN, GOAL BELOW 130/80: ICD-10-CM

## 2022-01-06 RX ORDER — HYDROCHLOROTHIAZIDE 25 MG/1
TABLET ORAL
Qty: 90 TABLET | Refills: 1 | Status: SHIPPED | OUTPATIENT
Start: 2022-01-06 | End: 2022-07-06

## 2022-03-19 PROBLEM — Z87.891 EX-SMOKER: Status: ACTIVE | Noted: 2018-06-04

## 2022-03-19 PROBLEM — I65.23 BILATERAL CAROTID ARTERY STENOSIS: Status: ACTIVE | Noted: 2020-03-15

## 2022-03-19 PROBLEM — R93.1 AGATSTON CAC SCORE, >400: Status: ACTIVE | Noted: 2018-06-04

## 2022-03-19 PROBLEM — I25.10 CAD (CORONARY ARTERY DISEASE), NATIVE CORONARY ARTERY: Status: ACTIVE | Noted: 2018-06-29

## 2022-03-21 RX ORDER — LEVOTHYROXINE SODIUM 88 UG/1
88 TABLET ORAL
Qty: 90 TABLET | Refills: 1 | Status: SHIPPED | OUTPATIENT
Start: 2022-03-21 | End: 2022-08-17 | Stop reason: DRUGHIGH

## 2022-04-18 ENCOUNTER — TRANSCRIBE ORDER (OUTPATIENT)
Dept: SCHEDULING | Age: 72
End: 2022-04-18

## 2022-04-18 DIAGNOSIS — Z87.891 PERSONAL HISTORY OF NICOTINE DEPENDENCE: Primary | ICD-10-CM

## 2022-04-25 ENCOUNTER — HOSPITAL ENCOUNTER (OUTPATIENT)
Dept: CT IMAGING | Age: 72
Discharge: HOME | End: 2022-04-25
Attending: INTERNAL MEDICINE
Payer: MEDICARE

## 2022-04-25 VITALS — BODY MASS INDEX: 23.46 KG/M2 | HEIGHT: 66 IN | WEIGHT: 146 LBS

## 2022-04-25 DIAGNOSIS — Z87.891 PERSONAL HISTORY OF NICOTINE DEPENDENCE: ICD-10-CM

## 2022-04-25 PROCEDURE — 71271 CT THORAX LUNG CANCER SCR C-: CPT

## 2022-05-09 ENCOUNTER — TELEPHONE (OUTPATIENT)
Dept: INTERNAL MEDICINE CLINIC | Age: 72
End: 2022-05-09

## 2022-05-09 NOTE — TELEPHONE ENCOUNTER
V/m left for patient notifying of need to reschedule her visit due to provider being out of office sick. Patient asked to call back to reschedule.

## 2022-05-13 ENCOUNTER — OFFICE VISIT (OUTPATIENT)
Dept: INTERNAL MEDICINE CLINIC | Age: 72
End: 2022-05-13
Payer: MEDICARE

## 2022-05-13 VITALS
BODY MASS INDEX: 22.21 KG/M2 | OXYGEN SATURATION: 99 % | DIASTOLIC BLOOD PRESSURE: 82 MMHG | SYSTOLIC BLOOD PRESSURE: 124 MMHG | HEIGHT: 66 IN | WEIGHT: 138.2 LBS | RESPIRATION RATE: 16 BRPM | TEMPERATURE: 97.8 F | HEART RATE: 53 BPM

## 2022-05-13 DIAGNOSIS — J47.9 BRONCHIECTASIS WITHOUT COMPLICATION (HCC): ICD-10-CM

## 2022-05-13 DIAGNOSIS — J30.1 SEASONAL ALLERGIC RHINITIS DUE TO POLLEN: ICD-10-CM

## 2022-05-13 DIAGNOSIS — E03.4 HYPOTHYROIDISM DUE TO ACQUIRED ATROPHY OF THYROID: ICD-10-CM

## 2022-05-13 DIAGNOSIS — I10 HTN, GOAL BELOW 130/80: Primary | ICD-10-CM

## 2022-05-13 DIAGNOSIS — R91.8 PULMONARY NODULES: ICD-10-CM

## 2022-05-13 DIAGNOSIS — R10.32 LEFT GROIN PAIN: ICD-10-CM

## 2022-05-13 DIAGNOSIS — E78.5 DYSLIPIDEMIA, GOAL LDL BELOW 70: ICD-10-CM

## 2022-05-13 PROCEDURE — G8752 SYS BP LESS 140: HCPCS | Performed by: INTERNAL MEDICINE

## 2022-05-13 PROCEDURE — G8536 NO DOC ELDER MAL SCRN: HCPCS | Performed by: INTERNAL MEDICINE

## 2022-05-13 PROCEDURE — G8427 DOCREV CUR MEDS BY ELIG CLIN: HCPCS | Performed by: INTERNAL MEDICINE

## 2022-05-13 PROCEDURE — 99214 OFFICE O/P EST MOD 30 MIN: CPT | Performed by: INTERNAL MEDICINE

## 2022-05-13 PROCEDURE — 3017F COLORECTAL CA SCREEN DOC REV: CPT | Performed by: INTERNAL MEDICINE

## 2022-05-13 PROCEDURE — G0463 HOSPITAL OUTPT CLINIC VISIT: HCPCS | Performed by: INTERNAL MEDICINE

## 2022-05-13 PROCEDURE — 1101F PT FALLS ASSESS-DOCD LE1/YR: CPT | Performed by: INTERNAL MEDICINE

## 2022-05-13 PROCEDURE — G8432 DEP SCR NOT DOC, RNG: HCPCS | Performed by: INTERNAL MEDICINE

## 2022-05-13 PROCEDURE — G8399 PT W/DXA RESULTS DOCUMENT: HCPCS | Performed by: INTERNAL MEDICINE

## 2022-05-13 PROCEDURE — G8420 CALC BMI NORM PARAMETERS: HCPCS | Performed by: INTERNAL MEDICINE

## 2022-05-13 PROCEDURE — G8754 DIAS BP LESS 90: HCPCS | Performed by: INTERNAL MEDICINE

## 2022-05-13 PROCEDURE — 1090F PRES/ABSN URINE INCON ASSESS: CPT | Performed by: INTERNAL MEDICINE

## 2022-05-13 RX ORDER — MONTELUKAST SODIUM 10 MG/1
10 TABLET ORAL DAILY
COMMUNITY
End: 2022-05-13 | Stop reason: SDUPTHER

## 2022-05-13 RX ORDER — MONTELUKAST SODIUM 10 MG/1
10 TABLET ORAL DAILY
Qty: 90 TABLET | Refills: 3 | Status: SHIPPED | OUTPATIENT
Start: 2022-05-13

## 2022-05-13 NOTE — PROGRESS NOTES
HISTORY OF PRESENT ILLNESS  Elizabeth Love is a 80 y.o. female. HPI  Seen at six month follow up for meds. She notes in the last two weeks she has had some discomfort in left groin area, not with walking, no falls. Notes it mostly when seated. Is in the process of packing to move into an [place] community. No weakness in foot or leg. Allergies. She is back on Singulair, which seems to help, and is thinking of resuming Claritin. No recent fevers or chills. Hypertension. BP has been in the 110-120/60 range, on HCTZ, Telmisartan, Atenolol combination. Does have follow up with cardiology this summer and anticipates some type of stress testing. Pulmonary nodules. Reviewed recent imaging, which shows fortunately stable multiple nodules, also has evidence of bronchiectasis. Discussed watch for signs and symptoms of active infection and low threshold for antibiotics given the structural findings. Review of Systems   Constitutional: Negative for chills, fever and weight loss. Respiratory: Negative for cough, shortness of breath and wheezing. Cardiovascular: Negative for chest pain, palpitations, orthopnea, leg swelling and PND. Gastrointestinal: Negative for abdominal pain, diarrhea, heartburn and nausea. Musculoskeletal: Positive for back pain and joint pain. Negative for falls and myalgias. Neurological: Negative for dizziness, sensory change, focal weakness and headaches. Physical Exam  Vitals and nursing note reviewed. Constitutional:       Appearance: She is well-developed. HENT:      Head: Normocephalic and atraumatic. Neck:      Thyroid: No thyromegaly. Vascular: No carotid bruit. Cardiovascular:      Rate and Rhythm: Normal rate and regular rhythm. Heart sounds: Normal heart sounds, S1 normal and S2 normal. No murmur heard. Pulmonary:      Effort: Pulmonary effort is normal. No respiratory distress. Breath sounds: Normal breath sounds.  No wheezing or rales. Musculoskeletal:         General: Normal range of motion. Cervical back: Normal range of motion and neck supple. Comments: No groin masses or nodules or adenopathy  Mild discomfort with ext rotation left hip   slr is negative   Neurological:      Mental Status: She is alert and oriented to person, place, and time. Psychiatric:         Behavior: Behavior normal.         ASSESSMENT and PLAN  Diagnoses and all orders for this visit:    1. HTN, goal below 130/80  Stable on meds  2. Dyslipidemia, goal LDL below 70-cont zetia and crestor   Orders for labs    3. Hypothyroidism due to acquired atrophy of thyroid    4. Left groin pain  Suspect msk  Tylenol and heat prn currently packing  house  5. Pulmonary nodules-stable over time  Cont annual check    6. Bronchiectasis without complication ([address]    7. Seasonal allergic rhinitis due to pollen  -     montelukast (Singulair) 10 mg tablet; Take 1 Tablet by mouth daily.

## 2022-05-16 ENCOUNTER — OFFICE VISIT (OUTPATIENT)
Dept: CARDIOLOGY CLINIC | Age: 72
End: 2022-05-16
Payer: MEDICARE

## 2022-05-16 VITALS
BODY MASS INDEX: 22.11 KG/M2 | HEART RATE: 55 BPM | RESPIRATION RATE: 16 BRPM | WEIGHT: 137.6 LBS | HEIGHT: 66 IN | SYSTOLIC BLOOD PRESSURE: 110 MMHG | DIASTOLIC BLOOD PRESSURE: 60 MMHG | OXYGEN SATURATION: 98 %

## 2022-05-16 DIAGNOSIS — E78.00 HYPERCHOLESTEROLEMIA: ICD-10-CM

## 2022-05-16 DIAGNOSIS — R93.1 AGATSTON CAC SCORE, >400: ICD-10-CM

## 2022-05-16 DIAGNOSIS — I25.10 CORONARY ARTERY DISEASE INVOLVING NATIVE CORONARY ARTERY OF NATIVE HEART WITHOUT ANGINA PECTORIS: Primary | ICD-10-CM

## 2022-05-16 DIAGNOSIS — Z87.891 EX-SMOKER: ICD-10-CM

## 2022-05-16 DIAGNOSIS — I10 ESSENTIAL HYPERTENSION: ICD-10-CM

## 2022-05-16 DIAGNOSIS — I65.23 BILATERAL CAROTID ARTERY STENOSIS: ICD-10-CM

## 2022-05-16 PROCEDURE — 1101F PT FALLS ASSESS-DOCD LE1/YR: CPT | Performed by: SPECIALIST

## 2022-05-16 PROCEDURE — G8536 NO DOC ELDER MAL SCRN: HCPCS | Performed by: SPECIALIST

## 2022-05-16 PROCEDURE — 93010 ELECTROCARDIOGRAM REPORT: CPT | Performed by: SPECIALIST

## 2022-05-16 PROCEDURE — G8420 CALC BMI NORM PARAMETERS: HCPCS | Performed by: SPECIALIST

## 2022-05-16 PROCEDURE — 93005 ELECTROCARDIOGRAM TRACING: CPT | Performed by: SPECIALIST

## 2022-05-16 PROCEDURE — 1090F PRES/ABSN URINE INCON ASSESS: CPT | Performed by: SPECIALIST

## 2022-05-16 PROCEDURE — 99214 OFFICE O/P EST MOD 30 MIN: CPT | Performed by: SPECIALIST

## 2022-05-16 PROCEDURE — G8752 SYS BP LESS 140: HCPCS | Performed by: SPECIALIST

## 2022-05-16 PROCEDURE — G8399 PT W/DXA RESULTS DOCUMENT: HCPCS | Performed by: SPECIALIST

## 2022-05-16 PROCEDURE — G8510 SCR DEP NEG, NO PLAN REQD: HCPCS | Performed by: SPECIALIST

## 2022-05-16 PROCEDURE — 3017F COLORECTAL CA SCREEN DOC REV: CPT | Performed by: SPECIALIST

## 2022-05-16 PROCEDURE — G8754 DIAS BP LESS 90: HCPCS | Performed by: SPECIALIST

## 2022-05-16 PROCEDURE — G0463 HOSPITAL OUTPT CLINIC VISIT: HCPCS | Performed by: SPECIALIST

## 2022-05-16 PROCEDURE — G8427 DOCREV CUR MEDS BY ELIG CLIN: HCPCS | Performed by: SPECIALIST

## 2022-05-16 RX ORDER — CHLORHEXIDINE GLUCONATE 1.2 MG/ML
RINSE ORAL
COMMUNITY
Start: 2022-05-03

## 2022-05-16 NOTE — Clinical Note
5/16/2022    Patient: Nathanael Forrest   YOB: 1950   Date of Visit: 5/16/2022     [address] [facility] [address]  [address] [facility]  Via In Basket    Dear Vahe Suggs MD,      Thank you for referring Ms. Claudene Dolores to [facility] for evaluation. My notes for this consultation are attached. If you have questions, please do not hesitate to call me. I look forward to following your patient along with you.       Sincerely,    Nena Vasquez MD

## 2022-05-16 NOTE — PROGRESS NOTES
Dahiana Marina     1950       Lior Baeza MD, [facility]  Date of Visit-5/16/2022   PCP is Crista Mcadams, [address] [facility] [place]  [facility]  HPI:  Dahiana Marina is a 80 y.o. female   9 month f/u of CAD.   · CAD Cath 6/29/18  LAD proximal 60%.  FFR not significant.     · Pt has quit smoking since 2018 October. · Calcium score 2,039  · Mild carotid artery disease       Today. .. Pt has seen PCP noting stable pulmonary nodules and BP at recent visit. Pt states that her groin pain has been giving her some issues when she sits down, but not when she is moving. She believes the pain is from her back. Pt states that she is moving to [facility] soon so Rohini Castaneda have to walk so much. She feels the Zetia is great for her lipid #s but notes more joint pain now. Denies chest pain, edema, syncope or shortness of breath at rest, has no tachycardia, palpitations or sense of arrhythmia. EKG: SB WNL normal axis and intervals     Assessment/Plan:     Patient Instructions   We will see you back for an annual follow up. 1. Coronary artery disease involving native coronary artery of native heart without angina pectoris  2018- LAD 60%  No CP, feels well , nuke last visit normal.   EKG with no STT changes  soledad  07/26/21 NUCLEAR CARDIAC STRESS TEST  Interpretation Summary  · SPECT: Left ventricular function post-stress was normal. Calculated ejection fraction is 71%. There is no evidence of transient ischemic dilation (TID). The TID ratio is 1.  · Baseline ECG: Normal sinus rhythm, non-specific ST-T wave abnormalities. · SPECT: Myocardial perfusion imaging supports a low risk stress test.    2. Agatston CAC score, >400  She does note some achy joints, told her she can be off zetia a week or two and see how she feel sx huston. Added Zetia, I am very happy with LDL of 54. We should continue to be aggressive.       3.  Hypercholesterolemia  Lipids on high potency statin as appropriate for secondary prevention. At goal , denies excess muscle aches or new liver issues  Key Antihyperlipidemia Meds             rosuvastatin (CRESTOR) 40 mg tablet (Taking) TAKE 1 TABLET BY MOUTH EVERY DAY AT NIGHT    ezetimibe (ZETIA) 10 mg tablet (Taking) Take 1 Tablet by mouth daily. omega 3-dha-epa-fish oil (FISH OIL) 100-160-1,000 mg cap (Taking) Take 2 Caps by mouth daily. Lab Results   Component Value Date/Time    LDL, calculated 44 11/17/2021 12:00 AM    LDL, calculated 63 05/14/2020 12:00 AM          4. Essential hypertension  At goal , meds and possible side effects reviewed and patient denies  Key CAD CHF Meds             hydroCHLOROthiazide (HYDRODIURIL) 25 mg tablet (Taking) TAKE 1 TABLET BY MOUTH EVERY DAY    rosuvastatin (CRESTOR) 40 mg tablet (Taking) TAKE 1 TABLET BY MOUTH EVERY DAY AT NIGHT    telmisartan (MICARDIS) 40 mg tablet (Taking) TAKE 1 TABLET BY MOUTH EVERY DAY    atenoloL (TENORMIN) 100 mg tablet (Taking) TAKE 1 TABLET BY MOUTH EVERY DAY    ezetimibe (ZETIA) 10 mg tablet (Taking) Take 1 Tablet by mouth daily. nitroglycerin (NITROSTAT) 0.4 mg SL tablet (Taking) 1 Tab by SubLINGual route every five (5) minutes as needed for Chest Pain. omega 3-dha-epa-fish oil (FISH OIL) 100-160-1,000 mg cap (Taking) Take 2 Caps by mouth daily. aspirin delayed-release 81 mg tablet (Taking) Take  by mouth daily. BP Readings from Last 6 Encounters:   05/16/22 110/60   05/13/22 124/82   11/03/21 116/64   07/26/21 136/70   07/26/21 136/70   03/18/21 120/60           5. Ex-smoker  05/27/20    ECHO STRESS 05/28/2020 6/16/2020    Interpretation Summary  · Baseline ECG: Normal sinus rhythm, non-specific ST-T wave abnormalities. · Moderate risk Duke treadmill score. · Normal stress echocardiogram. Low risk study.     Signed by: Gilberto Cheney MD on 5/28/2020  3:10 PM  6. Bilateral carotid artery stenosis  she has some carotid artery disease of less than 30%   F/u in 1 year     Impression:   1. Coronary artery disease involving native coronary artery of native heart without angina pectoris    2. Agatston CAC score, >400    3. Hypercholesterolemia    4. Essential hypertension    5. Ex-smoker    6. Bilateral carotid artery stenosis       Cardiac History:   Cath 6/29/18  LAD proximal 60%. FFR not significant  CT coronary arteries 05/22/18 - left main ostial heavy calcification and left main distally. LAD heavy calcification proximal distal portion and heavy calcification D1. Left circumflex heavy calcified plaque in the proximal portion. RCA heavy calcified plaque of mid and distal RCA. Calcium score 03/29/18 showed calcium score of 2,039, large amount of calcium in LAD and RCA. Carotid dopplers 02/13/18 showed less than 50% stenosis of right internal and less than 50% of the left internal carotid arteries. CT scan 2015 showed calcium in the left main. CT done for pulmonary nodule previously. Stress echo 04/11/18, Dr. Kimmie Hill - EKG changes, non diagnostic, walked 5 minutes, no echo changes. Future Appointments   Date Time Provider Felisha Espino   11/16/2022 11:00 AM Jessa Meza MD [facility] BS AMB   5/17/2023 11:20 AM Kade Degroot MD [facility] AMB        Patient Care Team:  Jessa Meza MD as PCP - General  Jessa Meza MD as PCP - [facility] Empaneled Provider  Kade Degroot MD (Cardiovascular Disease Physician)    ROS-except as noted above. . A complete cardiac and respiratory are reviewed and negative except as above ; Resp-denies wheezing  or productive cough,.  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 Her smoking use included cigarettes. She has a 0.50 pack-year smoking history. She has never used smokeless tobacco. She reports current alcohol use of about 3.3 standard drinks of alcohol per week. She reports that she does not use drugs. Exam and Labs:  /60   Pulse (!) 55   Resp 16   Ht 5' 6\" (1.676 m)   Wt 137 lb 9.6 oz (62.4 kg)   SpO2 98%   BMI 22.21 kg/m² Constitutional:  NAD, comfortable  Head: NC,AT. Eyes: No scleral icterus. Neck:  Neck supple. No JVD present. Throat: moist mucous membranes. Chest: Effort normal & normal respiratory excursion . Neurological: alert, conversant and oriented . Skin: Skin is not cold. No obvious systemic rash noted. Not diaphoretic. No erythema. Psychiatric:  Grossly normal mood and affect. Behavior appears normal. Extremities:  no clubbing or cyanosis. Abdomen: non distended    Lungs:breath sounds normal. No stridor. distress, wheezes or  Rales. Heart: normal rate, regular rhythm, normal S1, S2, no murmurs, rubs, clicks or gallops , PMI non displaced. Edema: Edema is none.   Lab Results   Component Value Date/Time    Cholesterol, total 117 11/17/2021 12:00 AM    HDL Cholesterol 56 11/17/2021 12:00 AM    LDL, calculated 44 11/17/2021 12:00 AM    LDL, calculated 63 05/14/2020 12:00 AM    Triglyceride 90 11/17/2021 12:00 AM    CHOL/HDL Ratio 3.37 10/06/2014 12:00 AM     Lab Results   Component Value Date/Time    Sodium 140 11/17/2021 12:00 AM    Potassium 4.1 11/17/2021 12:00 AM    Chloride 101 11/17/2021 12:00 AM    CO2 23 11/17/2021 12:00 AM    Glucose 100 (H) 11/17/2021 12:00 AM    BUN 15 11/17/2021 12:00 AM    Creatinine 0.74 11/17/2021 12:00 AM    BUN/Creatinine ratio 20 11/17/2021 12:00 AM    GFR est AA 95 11/17/2021 12:00 AM    GFR est non-AA 82 11/17/2021 12:00 AM    Calcium 9.6 11/17/2021 12:00 AM      Wt Readings from Last 3 Encounters:   05/16/22 137 lb 9.6 oz (62.4 kg)   05/13/22 138 lb 3.2 oz (62.7 kg)   04/25/22 146 lb (66.2 kg)      BP Readings from Last 3 Encounters:   05/16/22 110/60   05/13/22 124/82   11/03/21 116/64      Current Outpatient Medications   Medication Sig    chlorhexidine (PERIDEX) 0.12 % solution RINSE WITH 15ML BY MOUTH IN MORNING AND EVENING THEN SPIT OUT EXCESS    montelukast (Singulair) 10 mg tablet Take 1 Tablet by mouth daily.  levothyroxine (SYNTHROID) 88 mcg tablet Take 1 Tablet by mouth Daily (before breakfast).  hydroCHLOROthiazide (HYDRODIURIL) 25 mg tablet TAKE 1 TABLET BY MOUTH EVERY DAY    rosuvastatin (CRESTOR) 40 mg tablet TAKE 1 TABLET BY MOUTH EVERY DAY AT NIGHT    telmisartan (MICARDIS) 40 mg tablet TAKE 1 TABLET BY MOUTH EVERY DAY    atenoloL (TENORMIN) 100 mg tablet TAKE 1 TABLET BY MOUTH EVERY DAY    ezetimibe (ZETIA) 10 mg tablet Take 1 Tablet by mouth daily.  cholecalciferol (Vitamin D3) (1000 Units /25 mcg) tablet Take 1,000 Units by mouth daily.  nitroglycerin (NITROSTAT) 0.4 mg SL tablet 1 Tab by SubLINGual route every five (5) minutes as needed for Chest Pain.  vitamin E acetate (VITAMIN E PO) Take  by mouth.  MULTIVITAMIN PO Take 1 Tablet by mouth daily.  ascorbic acid, vitamin C, (Vitamin C) 1,000 mg tablet Take 2,000 mg by mouth daily.  esomeprazole (NEXIUM) 20 mg capsule Take 20 mg by mouth daily.  omega 3-dha-epa-fish oil (FISH OIL) 100-160-1,000 mg cap Take 2 Caps by mouth daily.  ibuprofen (MOTRIN) 800 mg tablet Take 1 Tab by mouth every eight (8) hours as needed for Pain. (Patient taking differently: Take 400 mg by mouth as needed for Pain.)    aspirin delayed-release 81 mg tablet Take  by mouth daily. No current facility-administered medications for this visit. Impression see above.       Written by Niru Clancy, as dictated by Ronnie Latif MD.

## 2022-05-24 DIAGNOSIS — I10 ESSENTIAL HYPERTENSION: ICD-10-CM

## 2022-05-24 RX ORDER — ATENOLOL 100 MG/1
TABLET ORAL
Qty: 90 TABLET | Refills: 2 | Status: SHIPPED | OUTPATIENT
Start: 2022-05-24

## 2022-06-10 DIAGNOSIS — E78.5 DYSLIPIDEMIA, GOAL LDL BELOW 70: ICD-10-CM

## 2022-06-10 DIAGNOSIS — I10 ESSENTIAL HYPERTENSION WITH GOAL BLOOD PRESSURE LESS THAN 130/80: ICD-10-CM

## 2022-06-10 RX ORDER — ROSUVASTATIN CALCIUM 40 MG/1
TABLET, COATED ORAL
Qty: 90 TABLET | Refills: 1 | Status: SHIPPED | OUTPATIENT
Start: 2022-06-10

## 2022-06-10 RX ORDER — TELMISARTAN 40 MG/1
TABLET ORAL
Qty: 90 TABLET | Refills: 1 | Status: SHIPPED | OUTPATIENT
Start: 2022-06-10

## 2022-07-06 DIAGNOSIS — I10 HTN, GOAL BELOW 130/80: ICD-10-CM

## 2022-07-06 RX ORDER — HYDROCHLOROTHIAZIDE 25 MG/1
TABLET ORAL
Qty: 90 TABLET | Refills: 1 | Status: SHIPPED | OUTPATIENT
Start: 2022-07-06

## 2022-08-17 ENCOUNTER — OFFICE VISIT (OUTPATIENT)
Dept: INTERNAL MEDICINE CLINIC | Age: 72
End: 2022-08-17
Payer: MEDICARE

## 2022-08-17 VITALS
TEMPERATURE: 97.7 F | OXYGEN SATURATION: 98 % | HEIGHT: 66 IN | BODY MASS INDEX: 20.89 KG/M2 | WEIGHT: 130 LBS | HEART RATE: 54 BPM | RESPIRATION RATE: 16 BRPM | DIASTOLIC BLOOD PRESSURE: 76 MMHG | SYSTOLIC BLOOD PRESSURE: 134 MMHG

## 2022-08-17 DIAGNOSIS — Z00.00 MEDICARE ANNUAL WELLNESS VISIT, SUBSEQUENT: Primary | ICD-10-CM

## 2022-08-17 DIAGNOSIS — E78.00 HYPERCHOLESTEROLEMIA: ICD-10-CM

## 2022-08-17 DIAGNOSIS — Z12.31 BREAST CANCER SCREENING BY MAMMOGRAM: ICD-10-CM

## 2022-08-17 DIAGNOSIS — E03.9 ACQUIRED HYPOTHYROIDISM: ICD-10-CM

## 2022-08-17 DIAGNOSIS — R63.4 WEIGHT LOSS: ICD-10-CM

## 2022-08-17 DIAGNOSIS — I10 HTN, GOAL BELOW 130/80: ICD-10-CM

## 2022-08-17 DIAGNOSIS — R91.8 PULMONARY NODULES: ICD-10-CM

## 2022-08-17 PROCEDURE — 1090F PRES/ABSN URINE INCON ASSESS: CPT | Performed by: INTERNAL MEDICINE

## 2022-08-17 PROCEDURE — G8752 SYS BP LESS 140: HCPCS | Performed by: INTERNAL MEDICINE

## 2022-08-17 PROCEDURE — G8427 DOCREV CUR MEDS BY ELIG CLIN: HCPCS | Performed by: INTERNAL MEDICINE

## 2022-08-17 PROCEDURE — G8399 PT W/DXA RESULTS DOCUMENT: HCPCS | Performed by: INTERNAL MEDICINE

## 2022-08-17 PROCEDURE — 3017F COLORECTAL CA SCREEN DOC REV: CPT | Performed by: INTERNAL MEDICINE

## 2022-08-17 PROCEDURE — 1101F PT FALLS ASSESS-DOCD LE1/YR: CPT | Performed by: INTERNAL MEDICINE

## 2022-08-17 PROCEDURE — G0463 HOSPITAL OUTPT CLINIC VISIT: HCPCS | Performed by: INTERNAL MEDICINE

## 2022-08-17 PROCEDURE — G8536 NO DOC ELDER MAL SCRN: HCPCS | Performed by: INTERNAL MEDICINE

## 2022-08-17 PROCEDURE — G0439 PPPS, SUBSEQ VISIT: HCPCS | Performed by: INTERNAL MEDICINE

## 2022-08-17 PROCEDURE — G9899 SCRN MAM PERF RSLTS DOC: HCPCS | Performed by: INTERNAL MEDICINE

## 2022-08-17 PROCEDURE — G8754 DIAS BP LESS 90: HCPCS | Performed by: INTERNAL MEDICINE

## 2022-08-17 PROCEDURE — G8420 CALC BMI NORM PARAMETERS: HCPCS | Performed by: INTERNAL MEDICINE

## 2022-08-17 PROCEDURE — G8432 DEP SCR NOT DOC, RNG: HCPCS | Performed by: INTERNAL MEDICINE

## 2022-08-17 PROCEDURE — 99214 OFFICE O/P EST MOD 30 MIN: CPT | Performed by: INTERNAL MEDICINE

## 2022-08-17 RX ORDER — LEVOTHYROXINE SODIUM 50 UG/1
50 TABLET ORAL
Qty: 30 TABLET | Refills: 2 | Status: SHIPPED | OUTPATIENT
Start: 2022-08-17 | End: 2023-08-17

## 2022-08-17 NOTE — PROGRESS NOTES
HISTORY OF PRESENT ILLNESS  Ofe Chang is a 80 y.o. female. HPI  Velma Cortes is in the process of moving into an [address] new [address] [place]. She has been very active with this, notes her weight is down on my scale 7 pounds from May. Currently on Synthroid 88 and recent TSHs have been on the low end of normal.  Will try dropping dose from 88 to 50. She is also having some sweats and hot flashes. Hypertension, reasonably controlled on HCTZ/Micardis combination. History of heart disease. Is smoking occasionally. Encouraged full cessation, continue on statin. Preventive care. Encouraged vaccines, including Tdap, Prevnar 20, COVID booster. Encouraged mammogram.      Review of Systems   Constitutional:  Positive for diaphoresis and weight loss. Negative for chills, fever and malaise/fatigue. Respiratory:  Negative for cough, shortness of breath and wheezing. Cardiovascular:  Negative for chest pain, palpitations, orthopnea, leg swelling and PND. Gastrointestinal:  Negative for abdominal pain, diarrhea, heartburn and nausea. Musculoskeletal:  Negative for myalgias. Neurological:  Negative for dizziness and headaches. Physical Exam  Vitals and nursing note reviewed. Constitutional:       Appearance: She is well-developed. HENT:      Head: Normocephalic and atraumatic. Neck:      Thyroid: No thyromegaly. Vascular: No carotid bruit. Cardiovascular:      Rate and Rhythm: Normal rate and regular rhythm. Heart sounds: Normal heart sounds, S1 normal and S2 normal. No murmur heard. Pulmonary:      Effort: Pulmonary effort is normal. No respiratory distress. Breath sounds: Normal breath sounds. No wheezing or rales. Musculoskeletal:      Cervical back: Normal range of motion and neck supple. Lymphadenopathy:      Cervical: No cervical adenopathy. Neurological:      Mental Status: She is alert and oriented to person, place, and time.    Psychiatric: Behavior: Behavior normal.       ASSESSMENT and PLAN  Diagnoses and all orders for this visit:    1. Medicare annual wellness visit, subsequent    2. HTN, goal below 130/80    3. Pulmonary nodules-stable over time    4. Hypercholesterolemia  -     METABOLIC PANEL, COMPREHENSIVE; Future  -     LIPID PANEL; Future    5. Breast cancer screening by mammogram  -     AVE 3D MILA W MAMMO BI SCREENING INCL CAD; Future    6. Acquired hypothyroidism  -     synthroid 50 mcg tablet; Take 1 Tablet by mouth Daily (before breakfast). -     TSH 3RD GENERATION; Future  -     T4, FREE; Future    7. Weight loss  -     CBC WITH AUTOMATED DIFF; Future  -     HEMOGLOBIN A1C WITH EAG; Future      the following changes in treatment are made: dec dose of thyroid from 88  Appt in nov with labs priorThis is the Subsequent Medicare Annual Wellness Exam, performed 12 months or more after the Initial AWV or the last Subsequent AWV    I have reviewed the patient's medical history in detail and updated the computerized patient record. Assessment/Plan   Education and counseling provided:  Are appropriate based on today's review and evaluation  Pneumococcal Vaccine  Influenza Vaccine  Screening Mammography  Screening for glaucoma    1. Medicare annual wellness visit, subsequent  2. HTN, goal below 130/80  3. Pulmonary nodules  4. Hypercholesterolemia  -     METABOLIC PANEL, COMPREHENSIVE; Future  -     LIPID PANEL; Future  5. Breast cancer screening by mammogram  -     AVE 3D MILA W MAMMO BI SCREENING INCL CAD; Future  6. Acquired hypothyroidism  -     synthroid 50 mcg tablet; Take 1 Tablet by mouth Daily (before breakfast). , Normal, Disp-30 Tablet, R-2, TREVIN  -     TSH 3RD GENERATION; Future  -     T4, FREE; Future  7. Weight loss  -     CBC WITH AUTOMATED DIFF; Future  -     HEMOGLOBIN A1C WITH EAG;  Future       Depression Risk Factor Screening     3 most recent PHQ Screens 5/16/2022   Little interest or pleasure in doing things Not at all   Feeling down, depressed, irritable, or hopeless Not at all   Total Score PHQ 2 0   Trouble falling or staying asleep, or sleeping too much Not at all   Feeling tired or having little energy Not at all   Poor appetite, weight loss, or overeating Not at all   Feeling bad about yourself - or that you are a failure or have let yourself or your family down Not at all   Trouble concentrating on things such as school, work, reading, or watching TV Not at all   Moving or speaking so slowly that other people could have noticed; or the opposite being so fidgety that others notice Not at all   Thoughts of being better off dead, or hurting yourself in some way Not at all   PHQ 9 Score 0       Alcohol & Drug Abuse Risk Screen    Do you average more than 1 drink per night or more than 7 drinks a week:  No    On any one occasion in the past three months have you have had more than 3 drinks containing alcohol:  No          Functional Ability and Level of Safety    Hearing: Hearing is good. Activities of Daily Living: The home contains: no safety equipment. Patient does total self care      Ambulation: with no difficulty     Fall Risk:  Fall Risk Assessment, last 12 mths 5/16/2022   Able to walk? Yes   Fall in past 12 months? 0   Do you feel unsteady?  0   Are you worried about falling 0      Abuse Screen:  Patient is not abused       Cognitive Screening    Has your family/caregiver stated any concerns about your memory: no     Cognitive Screening: Normal - Verbal Fluency Test    Health Maintenance Due     Health Maintenance Due   Topic Date Due    Foot Exam Q1  Never done    MICROALBUMIN Q1  Never done    Eye Exam Retinal or Dilated  Never done    DTaP/Tdap/Td series (1 - Tdap) Never done    Shingrix Vaccine Age 50> (1 of 2) Never done    Pneumococcal 65+ years (1 - PCV) Never done    COVID-19 Vaccine (3 - Booster for Moderna series) 11/01/2021    Medicare Yearly Exam  01/13/2022    Breast Cancer Screen Mammogram 08/12/2022       Patient Care Team   Patient Care Team:  Radha Bob MD as PCP - Christiane Galan MD as PCP - [facility]  Nitesh Osullivan MD (Cardiovascular Disease Physician)    History     Patient Active Problem List   Diagnosis Code    HTN (hypertension) I10    Hypothyroid E03.9    Hypercholesterolemia E78.00    Diverticulitis K57.92    Hiatal hernia K44.9    Agatston CAC score, >400 R93.1    Ex-smoker O74.758    CAD (coronary artery disease), native coronary artery I25.10    Bilateral carotid artery stenosis I65.23     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    Diverticulitis 7/28/2009    Ex-smoker 6/4/2018    HTN 7/28/2009    Hypercholesterolemia 7/28/2009    Hypothyroid 7/28/2009      Past Surgical History:   Procedure Laterality Date    HX ORTHOPAEDIC      anterior cervical discectomy and fusion     Current Outpatient Medications   Medication Sig Dispense Refill    synthroid 50 mcg tablet Take 1 Tablet by mouth Daily (before breakfast). 30 Tablet 2    hydroCHLOROthiazide (HYDRODIURIL) 25 mg tablet TAKE 1 TABLET BY MOUTH EVERY DAY 90 Tablet 1    telmisartan (MICARDIS) 40 mg tablet TAKE 1 TABLET BY MOUTH EVERY DAY 90 Tablet 1    rosuvastatin (CRESTOR) 40 mg tablet TAKE 1 TABLET BY MOUTH EVERY DAY AT NIGHT 90 Tablet 1    atenoloL (TENORMIN) 100 mg tablet TAKE 1 TABLET BY MOUTH EVERY DAY 90 Tablet 2    chlorhexidine (PERIDEX) 0.12 % solution RINSE WITH 15ML BY MOUTH IN MORNING AND EVENING THEN SPIT OUT EXCESS      montelukast (Singulair) 10 mg tablet Take 1 Tablet by mouth daily. 90 Tablet 3    ezetimibe (ZETIA) 10 mg tablet Take 1 Tablet by mouth daily.  90 Tablet 3    nitroglycerin (NITROSTAT) 0.4 mg SL tablet 1 Tab by SubLINGual route every five (5) minutes as needed for Chest Pain. 1 Bottle 1    MULTIVITAMIN PO Take 1 Tablet by mouth daily. esomeprazole (NEXIUM) 20 mg capsule Take 20 mg by mouth daily. ibuprofen (MOTRIN) 800 mg tablet Take 1 Tab by mouth every eight (8) hours as needed for Pain. (Patient taking differently: Take 400 mg by mouth as needed for Pain.) 40 Tab 0    aspirin delayed-release 81 mg tablet Take  by mouth daily. cholecalciferol (VITAMIN D3) (1000 Units /25 mcg) tablet Take 1,000 Units by mouth daily. (Patient not taking: Reported on 8/17/2022)      ascorbic acid, vitamin C, (VITAMIN C) 1,000 mg tablet Take 2,000 mg by mouth daily. (Patient not taking: Reported on 8/17/2022)       Allergies   Allergen Reactions    Codeine Nausea and Vomiting       Family History   Problem Relation Age of Onset    Heart Disease Mother     Heart Disease Father         chf in 80s     Social History     Tobacco Use    Smoking status: Former     Packs/day: 0.50     Years: 1.00     Pack years: 0.50     Types: Cigarettes    Smokeless tobacco: Never    Tobacco comments:     quit October 2017   Substance Use Topics    Alcohol use:  Yes     Alcohol/week: 3.3 standard drinks     Types: 4 Glasses of wine per week     Comment: occasional         Mary Jiménez MD

## 2022-08-17 NOTE — PATIENT INSTRUCTIONS
Medicare Wellness Visit, Female     The best way to live healthy is to have a lifestyle where you eat a well-balanced diet, exercise regularly, limit alcohol use, and quit all forms of tobacco/nicotine, if applicable. Regular preventive services are another way to keep healthy. Preventive services (vaccines, screening tests, monitoring & exams) can help personalize your care plan, which helps you manage your own care. Screening tests can find health problems at the earliest stages, when they are easiest to treat. [facility] follows the current, evidence-based guidelines published by the [facility] ([facility]) when recommending preventive services for our patients. Because we follow these guidelines, sometimes recommendations change over time as research supports it. (For example, mammograms used to be recommended annually. Even though Medicare will still pay for an annual mammogram, the newer guidelines recommend a mammogram every two years for women of average risk). Of course, you and your doctor may decide to screen more often for some diseases, based on your risk and your co-morbidities (chronic disease you are already diagnosed with). Preventive services for you include:  - Medicare offers their members a free annual wellness visit, which is time for you and your primary care provider to discuss and plan for your preventive service needs. Take advantage of this benefit every year!  -All adults over the age of 72 should receive the recommended pneumonia vaccines. Current USPSTF guidelines recommend a series of two vaccines for the best pneumonia protection.   -All adults should have a flu vaccine yearly and a tetanus vaccine every 10 years.   -All adults age 48 and older should receive the shingles vaccines (series of two vaccines).       -All adults age 38-68 who are overweight should have a diabetes screening test once every three years.   -All adults born between [birth year] and 1965 should be screened once for Hepatitis C.  -Other screening tests and preventive services for persons with diabetes include: an eye exam to screen for diabetic retinopathy, a kidney function test, a foot exam, and stricter control over your cholesterol.   -Cardiovascular screening for adults with routine risk involves an electrocardiogram (ECG) at intervals determined by your doctor.   -Colorectal cancer screenings should be done for adults age 54-65 with no increased risk factors for colorectal cancer. There are a number of acceptable methods of screening for this type of cancer. Each test has its own benefits and drawbacks. Discuss with your doctor what is most appropriate for you during your annual wellness visit. The different tests include: colonoscopy (considered the best screening method), a fecal occult blood test, a fecal DNA test, and sigmoidoscopy.    -A bone mass density test is recommended when a woman turns 65 to screen for osteoporosis. This test is only recommended one time, as a screening. Some providers will use this same test as a disease monitoring tool if you already have osteoporosis. -Breast cancer screenings are recommended every other year for women of normal risk, age 54-69.  -Cervical cancer screenings for women over age 72 are only recommended with certain risk factors.      Here is a list of your current Health Maintenance items (your personalized list of preventive services) with a due date:  Health Maintenance Due   Topic Date Due    Diabetic Foot Care  Never done    Albumin Urine Test  Never done    Eye Exam  Never done    DTaP/Tdap/Td  (1 - Tdap) Never done    Shingles Vaccine (1 of 2) Never done    Pneumococcal Vaccine (1 - PCV) Never done    COVID-19 Vaccine (3 - Booster for Carl Lords series) 11/01/2021    Annual Well Visit  01/13/2022    Mammogram  08/12/2022

## 2022-09-02 DIAGNOSIS — E78.00 HYPERCHOLESTEROLEMIA: ICD-10-CM

## 2022-09-02 DIAGNOSIS — R93.1 AGATSTON CAC SCORE, >400: ICD-10-CM

## 2022-09-02 DIAGNOSIS — I25.10 CORONARY ARTERY DISEASE INVOLVING NATIVE CORONARY ARTERY OF NATIVE HEART WITHOUT ANGINA PECTORIS: ICD-10-CM

## 2022-09-06 RX ORDER — EZETIMIBE 10 MG/1
10 TABLET ORAL DAILY
Qty: 90 TABLET | Refills: 3 | Status: SHIPPED | OUTPATIENT
Start: 2022-09-06

## 2022-09-06 NOTE — TELEPHONE ENCOUNTER
Per VO of MD    LOV: 5/16/2022    Future Appointments   Date Time Provider Felisha Espino   11/16/2022 11:00 AM Shelia Sagastume MD [facility] BS AMB   5/17/2023 11:20 AM Lior Tran MD [facility] BS AMB       Requested Prescriptions     Signed Prescriptions Disp Refills    ezetimibe (ZETIA) 10 mg tablet 90 Tablet 3     Sig: Take 1 Tablet by mouth daily.      Authorizing Provider: Nita Salazar     Ordering User: Naomy Mackenzie

## 2022-09-07 RX ORDER — EZETIMIBE 10 MG/1
TABLET ORAL
Qty: 90 TABLET | Refills: 3 | OUTPATIENT
Start: 2022-09-07

## 2022-10-28 ENCOUNTER — TRANSCRIBE ORDER (OUTPATIENT)
Dept: SCHEDULING | Age: 72
End: 2022-10-28

## 2022-10-28 DIAGNOSIS — Z12.31 SCREENING MAMMOGRAM FOR HIGH-RISK PATIENT: Primary | ICD-10-CM

## 2022-11-08 DIAGNOSIS — E03.9 ACQUIRED HYPOTHYROIDISM: ICD-10-CM

## 2022-11-08 RX ORDER — LEVOTHYROXINE SODIUM 50 UG/1
TABLET ORAL
Qty: 90 TABLET | Refills: 1 | Status: SHIPPED | OUTPATIENT
Start: 2022-11-08 | End: 2022-11-16 | Stop reason: DRUGHIGH

## 2022-11-16 ENCOUNTER — OFFICE VISIT (OUTPATIENT)
Dept: INTERNAL MEDICINE CLINIC | Age: 72
End: 2022-11-16
Payer: MEDICARE

## 2022-11-16 VITALS
RESPIRATION RATE: 16 BRPM | WEIGHT: 133.8 LBS | HEART RATE: 58 BPM | HEIGHT: 66 IN | BODY MASS INDEX: 21.5 KG/M2 | OXYGEN SATURATION: 99 % | DIASTOLIC BLOOD PRESSURE: 69 MMHG | SYSTOLIC BLOOD PRESSURE: 129 MMHG | TEMPERATURE: 98.2 F

## 2022-11-16 DIAGNOSIS — I10 HTN, GOAL BELOW 130/80: ICD-10-CM

## 2022-11-16 DIAGNOSIS — M79.10 MYALGIA: ICD-10-CM

## 2022-11-16 DIAGNOSIS — E03.9 ACQUIRED HYPOTHYROIDISM: Primary | ICD-10-CM

## 2022-11-16 DIAGNOSIS — E78.00 HYPERCHOLESTEROLEMIA: ICD-10-CM

## 2022-11-16 DIAGNOSIS — I25.10 CORONARY ARTERY DISEASE INVOLVING NATIVE CORONARY ARTERY OF NATIVE HEART WITHOUT ANGINA PECTORIS: ICD-10-CM

## 2022-11-16 PROCEDURE — G8399 PT W/DXA RESULTS DOCUMENT: HCPCS | Performed by: INTERNAL MEDICINE

## 2022-11-16 PROCEDURE — 3017F COLORECTAL CA SCREEN DOC REV: CPT | Performed by: INTERNAL MEDICINE

## 2022-11-16 PROCEDURE — G8427 DOCREV CUR MEDS BY ELIG CLIN: HCPCS | Performed by: INTERNAL MEDICINE

## 2022-11-16 PROCEDURE — 1101F PT FALLS ASSESS-DOCD LE1/YR: CPT | Performed by: INTERNAL MEDICINE

## 2022-11-16 PROCEDURE — G9899 SCRN MAM PERF RSLTS DOC: HCPCS | Performed by: INTERNAL MEDICINE

## 2022-11-16 PROCEDURE — G8752 SYS BP LESS 140: HCPCS | Performed by: INTERNAL MEDICINE

## 2022-11-16 PROCEDURE — 99214 OFFICE O/P EST MOD 30 MIN: CPT | Performed by: INTERNAL MEDICINE

## 2022-11-16 PROCEDURE — G0463 HOSPITAL OUTPT CLINIC VISIT: HCPCS | Performed by: INTERNAL MEDICINE

## 2022-11-16 PROCEDURE — 1090F PRES/ABSN URINE INCON ASSESS: CPT | Performed by: INTERNAL MEDICINE

## 2022-11-16 PROCEDURE — G8420 CALC BMI NORM PARAMETERS: HCPCS | Performed by: INTERNAL MEDICINE

## 2022-11-16 PROCEDURE — G8432 DEP SCR NOT DOC, RNG: HCPCS | Performed by: INTERNAL MEDICINE

## 2022-11-16 PROCEDURE — G8754 DIAS BP LESS 90: HCPCS | Performed by: INTERNAL MEDICINE

## 2022-11-16 PROCEDURE — G8536 NO DOC ELDER MAL SCRN: HCPCS | Performed by: INTERNAL MEDICINE

## 2022-11-16 RX ORDER — LEVOTHYROXINE SODIUM 75 UG/1
75 TABLET ORAL
Qty: 30 TABLET | Refills: 3 | Status: SHIPPED | OUTPATIENT
Start: 2022-11-16 | End: 2023-11-16

## 2022-11-16 RX ORDER — IBUPROFEN 600 MG/1
600 TABLET ORAL
COMMUNITY

## 2022-11-16 NOTE — PROGRESS NOTES
HISTORY OF PRESENT ILLNESS  Guillermina Narayan is a 71 y.o. female. HPI  Rusty De Leon is seen at follow up. Issues:  1. Thyroid. She has been on 50 mcg branded Synthroid and has not missed any pills, however TSH was up to 19. On discussion, she is back to social smoking, which may be having an impact. Will bump dose to 75 and check levels in two months. 2. Muscle aches both in arms and legs. Does sometimes get numbness in left leg, but describes aches in arms, could be from the 40 mg statin. Will check a CK and drop dose to 20 mg.  3. Chronic low back issues. Does use ibuprofen 600 once a day and Tylenol 650 once a day. Discussed osteopathic treatment as well. 4. Hypertension, well controlled. Review of Systems   Constitutional:  Positive for malaise/fatigue. Negative for chills, fever and weight loss. Respiratory:  Negative for cough, shortness of breath and wheezing. Cardiovascular:  Negative for chest pain, palpitations, orthopnea, leg swelling and PND. Gastrointestinal:  Negative for heartburn and nausea. Musculoskeletal:  Positive for back pain and myalgias. Negative for falls. Neurological:  Negative for dizziness, focal weakness and headaches. Physical Exam  Vitals and nursing note reviewed. Constitutional:       Appearance: She is well-developed. HENT:      Head: Normocephalic and atraumatic. Neck:      Thyroid: No thyromegaly. Vascular: No carotid bruit. Comments: No thyromegaly  Cardiovascular:      Rate and Rhythm: Normal rate and regular rhythm. Heart sounds: Normal heart sounds, S1 normal and S2 normal. No murmur heard. Pulmonary:      Effort: Pulmonary effort is normal. No respiratory distress. Breath sounds: Normal breath sounds. No wheezing or rales. Musculoskeletal:      Cervical back: Normal range of motion and neck supple. Comments: No vertebral point pain   Neurological:      Mental Status: She is alert and oriented to person, place, and time. Psychiatric:         Behavior: Behavior normal.       ASSESSMENT and PLAN  Diagnoses and all orders for this visit:    1. Acquired hypothyroidism  -     Synthroid 75 mcg tablet; Take 1 Tablet by mouth Daily (before breakfast). -     TSH 3RD GENERATION; Future  -     T4, FREE; Future  -     TSH 3RD GENERATION; Future  -     T4, FREE; Future    2. HTN, goal below 130/80-stable on med    3. Myalgia  -     CK; Future  -     CK; Future    4. Hypercholesterolemia  -     LIPID PANEL; Future  -     DUPLEX CAROTID BILATERAL; Future    5. Coronary artery disease involving native coronary artery of native heart without angina pectoris  -     DUPLEX CAROTID BILATERAL;  Future  Tobacco cessation encouraged    the following changes in treatment are made: inc from 50 to 75 mcg thyroid  Trial of 20 mg crestor down from 40 given myalgias  Appt in 6mo sooner prn

## 2022-11-17 LAB — CK SERPL-CCNC: 141 U/L (ref 32–182)

## 2022-11-27 DIAGNOSIS — I10 ESSENTIAL HYPERTENSION WITH GOAL BLOOD PRESSURE LESS THAN 130/80: ICD-10-CM

## 2022-11-27 RX ORDER — TELMISARTAN 40 MG/1
TABLET ORAL
Qty: 90 TABLET | Refills: 1 | Status: SHIPPED | OUTPATIENT
Start: 2022-11-27

## 2022-11-29 RX ORDER — OSELTAMIVIR PHOSPHATE 75 MG/1
75 CAPSULE ORAL 2 TIMES DAILY
Qty: 10 CAPSULE | Refills: 0 | Status: SHIPPED | OUTPATIENT
Start: 2022-11-29 | End: 2022-12-04

## 2022-12-14 ENCOUNTER — HOSPITAL ENCOUNTER (OUTPATIENT)
Dept: VASCULAR SURGERY | Age: 72
Discharge: HOME | End: 2022-12-14
Attending: INTERNAL MEDICINE
Payer: MEDICARE

## 2022-12-14 DIAGNOSIS — I25.10 CORONARY ARTERY DISEASE INVOLVING NATIVE CORONARY ARTERY OF NATIVE HEART WITHOUT ANGINA PECTORIS: ICD-10-CM

## 2022-12-14 DIAGNOSIS — E78.00 HYPERCHOLESTEROLEMIA: ICD-10-CM

## 2022-12-14 PROCEDURE — 93880 EXTRACRANIAL BILAT STUDY: CPT

## 2022-12-15 LAB
LEFT CCA DIST DIAS: 18.1 CM/S
LEFT CCA DIST SYS: 83.8 CM/S
LEFT CCA PROX DIAS: 21.7 CM/S
LEFT CCA PROX SYS: 91.1 CM/S
LEFT ECA DIAS: 0 CM/S
LEFT ECA SYS: 77.5 CM/S
LEFT ICA DIST DIAS: 25.6 CM/S
LEFT ICA DIST SYS: 93 CM/S
LEFT ICA MID DIAS: 17.9 CM/S
LEFT ICA MID SYS: 93 CM/S
LEFT ICA PROX DIAS: 33.4 CM/S
LEFT ICA PROX SYS: 175.9 CM/S
LEFT ICA/CCA SYS: 2.1 NO UNITS
LEFT VERTEBRAL DIAS: 8.5 CM/S
LEFT VERTEBRAL SYS: 45.3 CM/S
RIGHT CCA DIST DIAS: 8.9 CM/S
RIGHT CCA DIST SYS: 60.1 CM/S
RIGHT CCA PROX DIAS: 16.3 CM/S
RIGHT CCA PROX SYS: 103.9 CM/S
RIGHT ECA DIAS: 0 CM/S
RIGHT ECA SYS: 106.7 CM/S
RIGHT ICA DIST DIAS: 20.8 CM/S
RIGHT ICA DIST SYS: 69.1 CM/S
RIGHT ICA MID DIAS: 15.3 CM/S
RIGHT ICA MID SYS: 68 CM/S
RIGHT ICA PROX DIAS: 10.9 CM/S
RIGHT ICA PROX SYS: 51.5 CM/S
RIGHT ICA/CCA SYS: 1.1 NO UNITS
RIGHT VERTEBRAL DIAS: 10.9 CM/S
RIGHT VERTEBRAL SYS: 52.7 CM/S
VAS LEFT SUBCLAVIAN PROX EDV: 0 CM/S
VAS LEFT SUBCLAVIAN PROX PSV: 135 CM/S
VAS RIGHT SUBCLAVIAN PROX EDV: 0 CM/S
VAS RIGHT SUBCLAVIAN PROX PSV: 142.4 CM/S

## 2022-12-28 DIAGNOSIS — I10 HTN, GOAL BELOW 130/80: ICD-10-CM

## 2022-12-28 RX ORDER — HYDROCHLOROTHIAZIDE 25 MG/1
TABLET ORAL
Qty: 90 TABLET | Refills: 1 | Status: SHIPPED | OUTPATIENT
Start: 2022-12-28

## 2023-01-11 ENCOUNTER — HOSPITAL ENCOUNTER (OUTPATIENT)
Dept: MAMMOGRAPHY | Age: 73
Discharge: HOME | End: 2023-01-11
Attending: INTERNAL MEDICINE
Payer: MEDICARE

## 2023-01-11 DIAGNOSIS — Z12.31 SCREENING MAMMOGRAM FOR HIGH-RISK PATIENT: ICD-10-CM

## 2023-01-11 PROCEDURE — 77063 BREAST TOMOSYNTHESIS BI: CPT

## 2023-01-14 DIAGNOSIS — I10 ESSENTIAL HYPERTENSION WITH GOAL BLOOD PRESSURE LESS THAN 130/80: ICD-10-CM

## 2023-01-16 RX ORDER — TELMISARTAN 40 MG/1
40 TABLET ORAL DAILY
Qty: 90 TABLET | Refills: 1 | Status: SHIPPED | OUTPATIENT
Start: 2023-01-16

## 2023-01-23 ENCOUNTER — PATIENT MESSAGE (OUTPATIENT)
Dept: INTERNAL MEDICINE CLINIC | Age: 73
End: 2023-01-23

## 2023-01-23 DIAGNOSIS — R11.0 NAUSEA: Primary | ICD-10-CM

## 2023-01-24 RX ORDER — ONDANSETRON 4 MG/1
4 TABLET, ORALLY DISINTEGRATING ORAL
Qty: 12 TABLET | Refills: 0 | Status: SHIPPED | OUTPATIENT
Start: 2023-01-24

## 2023-01-24 NOTE — TELEPHONE ENCOUNTER
From: [employer] Running  To: Lolita Frances MD  Sent: 1/23/2023 3:51 PM EST  Subject: nauseated    last evening Lexx Cali and I both got food poisoning from our dinner. The nausea was better earlier today but now it is coming back. Lexx Cali has some ondansetron 4 mg. Is that safe for me to take? Last evening I threw and during the night I was throwing up then got better. Now it is beginning to come back. I am sure it is food poisoning as Both of us ate the same think and were sick about a hour later.   Thanks  Abdiaziz Melendrez

## 2023-01-26 DIAGNOSIS — E03.9 ACQUIRED HYPOTHYROIDISM: ICD-10-CM

## 2023-01-26 RX ORDER — LEVOTHYROXINE SODIUM 75 UG/1
TABLET ORAL
Qty: 60 TABLET | Refills: 1 | Status: SHIPPED | OUTPATIENT
Start: 2023-01-26

## 2023-02-16 DIAGNOSIS — E78.5 DYSLIPIDEMIA, GOAL LDL BELOW 70: ICD-10-CM

## 2023-02-17 RX ORDER — ROSUVASTATIN CALCIUM 40 MG/1
TABLET, COATED ORAL
Qty: 90 TABLET | Refills: 1 | Status: SHIPPED | OUTPATIENT
Start: 2023-02-17

## 2023-03-06 DIAGNOSIS — R93.1 AGATSTON CAC SCORE, >400: ICD-10-CM

## 2023-03-06 DIAGNOSIS — E78.00 HYPERCHOLESTEROLEMIA: ICD-10-CM

## 2023-03-06 DIAGNOSIS — I25.10 CORONARY ARTERY DISEASE INVOLVING NATIVE CORONARY ARTERY OF NATIVE HEART WITHOUT ANGINA PECTORIS: ICD-10-CM

## 2023-03-07 RX ORDER — EZETIMIBE 10 MG/1
10 TABLET ORAL DAILY
Qty: 90 TABLET | Refills: 0 | Status: SHIPPED | OUTPATIENT
Start: 2023-03-07

## 2023-03-07 NOTE — TELEPHONE ENCOUNTER
Cardiologist: Dr. Raheem Rodas    Last appt: 5/16/2022  Future Appointments   Date Time Provider Felisha Espino   5/17/2023 11:20 AM MD DARYA Ray [facility] AMB       Requested Prescriptions     Signed Prescriptions Disp Refills    ezetimibe (ZETIA) 10 mg tablet 90 Tablet 0     Sig: Take 1 Tablet by mouth daily.      Authorizing Provider: Lashay Dunn     Ordering User: LESLIE CALDERÓN VO per Dr. Raheem Rodas.

## 2023-05-08 ENCOUNTER — TELEPHONE (OUTPATIENT)
Age: 73
End: 2023-05-08

## 2023-05-08 DIAGNOSIS — E78.00 HYPERCHOLESTEROLEMIA: ICD-10-CM

## 2023-05-08 DIAGNOSIS — I25.10 ATHEROSCLEROTIC HEART DISEASE OF NATIVE CORONARY ARTERY WITHOUT ANGINA PECTORIS: Primary | ICD-10-CM

## 2023-05-08 DIAGNOSIS — E03.9 HYPOTHYROIDISM, UNSPECIFIED: ICD-10-CM

## 2023-05-08 NOTE — TELEPHONE ENCOUNTER
Pt is calling to see if she needs labs drawn before her upcoming apt. please send order over if needed and contact patient to update her.     [phone]

## 2023-05-09 RX ORDER — LEVOTHYROXINE SODIUM 75 MCG
TABLET ORAL
Qty: 90 TABLET | Refills: 1 | Status: SHIPPED | OUTPATIENT
Start: 2023-05-09

## 2023-05-12 DIAGNOSIS — I10 ESSENTIAL (PRIMARY) HYPERTENSION: Primary | ICD-10-CM

## 2023-05-12 RX ORDER — ATENOLOL 100 MG/1
TABLET ORAL
Qty: 90 TABLET | Refills: 1 | Status: SHIPPED | OUTPATIENT
Start: 2023-05-12

## 2023-05-12 NOTE — TELEPHONE ENCOUNTER
Verified patient with two types of identifiers. Mrs. Wan Mayes will have fasting labs completed per Dr. Claude Bergeron. She does not wish to have them completed at our lab downstairs and will  a printed copy from the . Confirmed follow up.      Future Appointments   Date Time Provider Louise Doll   5/17/2023 11:20 AM MD BECKY Chaidez AMB

## 2023-05-16 LAB
ALBUMIN SERPL-MCNC: 4.7 G/DL (ref 3.7–4.7)
ALBUMIN/GLOB SERPL: 2.2 {RATIO} (ref 1.2–2.2)
ALP SERPL-CCNC: 81 IU/L (ref 44–121)
ALT SERPL-CCNC: 50 IU/L (ref 0–32)
AST SERPL-CCNC: 40 IU/L (ref 0–40)
BILIRUB SERPL-MCNC: 0.5 MG/DL (ref 0–1.2)
BUN SERPL-MCNC: 13 MG/DL (ref 8–27)
BUN/CREAT SERPL: 18 (ref 12–28)
CALCIUM SERPL-MCNC: 9.7 MG/DL (ref 8.7–10.3)
CHLORIDE SERPL-SCNC: 100 MMOL/L (ref 96–106)
CHOLEST SERPL-MCNC: 129 MG/DL (ref 100–199)
CO2 SERPL-SCNC: 22 MMOL/L (ref 20–29)
CREAT SERPL-MCNC: 0.71 MG/DL (ref 0.57–1)
EGFRCR SERPLBLD CKD-EPI 2021: 91 ML/MIN/1.73
GLOBULIN SER CALC-MCNC: 2.1 G/DL (ref 1.5–4.5)
GLUCOSE SERPL-MCNC: 98 MG/DL (ref 70–99)
HDLC SERPL-MCNC: 64 MG/DL
LDLC SERPL CALC-MCNC: 42 MG/DL (ref 0–99)
POTASSIUM SERPL-SCNC: 4.5 MMOL/L (ref 3.5–5.2)
PROT SERPL-MCNC: 6.8 G/DL (ref 6–8.5)
SODIUM SERPL-SCNC: 137 MMOL/L (ref 134–144)
SPECIMEN STATUS REPORT: NORMAL
TRIGL SERPL-MCNC: 138 MG/DL (ref 0–149)
VLDLC SERPL CALC-MCNC: 23 MG/DL (ref 5–40)

## 2023-05-17 ENCOUNTER — OFFICE VISIT (OUTPATIENT)
Age: 73
End: 2023-05-17
Payer: MEDICARE

## 2023-05-17 VITALS
DIASTOLIC BLOOD PRESSURE: 70 MMHG | WEIGHT: 138 LBS | RESPIRATION RATE: 16 BRPM | HEART RATE: 57 BPM | SYSTOLIC BLOOD PRESSURE: 130 MMHG | OXYGEN SATURATION: 99 % | BODY MASS INDEX: 22.18 KG/M2 | HEIGHT: 66 IN

## 2023-05-17 DIAGNOSIS — E78.00 HYPERCHOLESTEROLEMIA: ICD-10-CM

## 2023-05-17 DIAGNOSIS — Z87.891 EX-SMOKER: ICD-10-CM

## 2023-05-17 DIAGNOSIS — I25.10 CORONARY ARTERY DISEASE INVOLVING NATIVE CORONARY ARTERY OF NATIVE HEART WITHOUT ANGINA PECTORIS: Primary | ICD-10-CM

## 2023-05-17 DIAGNOSIS — R93.1 AGATSTON CAC SCORE, >400: ICD-10-CM

## 2023-05-17 DIAGNOSIS — I65.23 BILATERAL CAROTID ARTERY STENOSIS: ICD-10-CM

## 2023-05-17 DIAGNOSIS — I10 ESSENTIAL (PRIMARY) HYPERTENSION: ICD-10-CM

## 2023-05-17 PROCEDURE — 93005 ELECTROCARDIOGRAM TRACING: CPT | Performed by: SPECIALIST

## 2023-05-17 PROCEDURE — 99214 OFFICE O/P EST MOD 30 MIN: CPT | Performed by: SPECIALIST

## 2023-05-17 RX ORDER — ROSUVASTATIN CALCIUM 20 MG/1
20 TABLET, COATED ORAL EVERY OTHER DAY
COMMUNITY
End: 2023-05-17

## 2023-05-17 RX ORDER — ASCORBATE CALCIUM 500 MG
TABLET ORAL
COMMUNITY

## 2023-05-17 RX ORDER — ROSUVASTATIN CALCIUM 20 MG/1
20 TABLET, COATED ORAL DAILY
Qty: 90 TABLET | Refills: 3 | Status: SHIPPED | OUTPATIENT
Start: 2023-05-17

## 2023-05-26 PROBLEM — I25.10 CORONARY ARTERY DISEASE INVOLVING NATIVE CORONARY ARTERY OF NATIVE HEART WITHOUT ANGINA PECTORIS: Status: ACTIVE | Noted: 2018-06-29

## 2023-05-30 ENCOUNTER — PATIENT MESSAGE (OUTPATIENT)
Age: 73
End: 2023-05-30

## 2023-05-30 DIAGNOSIS — I25.10 CORONARY ARTERY DISEASE INVOLVING NATIVE CORONARY ARTERY OF NATIVE HEART WITHOUT ANGINA PECTORIS: Primary | ICD-10-CM

## 2023-05-30 RX ORDER — NITROGLYCERIN 0.4 MG/1
0.4 TABLET SUBLINGUAL EVERY 5 MIN PRN
Qty: 30 TABLET | Refills: 5 | Status: SHIPPED | OUTPATIENT
Start: 2023-05-30

## 2023-05-30 NOTE — TELEPHONE ENCOUNTER
Per VO by Dr. Shelli Mcclendon as Dr. Sergio Ayala is out of the office.      Future Appointments   Date Time Provider Louise Doll   5/15/2024 11:40 AM MD BECKY Olivarez AMB

## 2023-06-25 RX ORDER — HYDROCHLOROTHIAZIDE 25 MG/1
25 TABLET ORAL DAILY
Qty: 90 TABLET | Refills: 1 | Status: SHIPPED | OUTPATIENT
Start: 2023-06-25

## 2023-08-16 ENCOUNTER — TELEPHONE (OUTPATIENT)
Age: 73
End: 2023-08-16

## 2023-08-16 NOTE — TELEPHONE ENCOUNTER
Pt was transferred via the nurse triage line, c/o dizziness x 1.5 week with right ear fullness and pain. She has tried otc allergy medication to see if that would relive her sx, no improvement. I offered an appt this AM, pt denied, she has someone at her house working. I offered tomorrow early AM, pt denied she would like something later. Appt scheduled for 1030am tomorrow. Pt was advised if sx get worse she needs ER/UC evaluation.

## 2023-08-17 ENCOUNTER — OFFICE VISIT (OUTPATIENT)
Age: 73
End: 2023-08-17
Payer: MEDICARE

## 2023-08-17 VITALS
BODY MASS INDEX: 22.28 KG/M2 | HEART RATE: 59 BPM | SYSTOLIC BLOOD PRESSURE: 124 MMHG | WEIGHT: 138.6 LBS | RESPIRATION RATE: 16 BRPM | HEIGHT: 66 IN | DIASTOLIC BLOOD PRESSURE: 74 MMHG | OXYGEN SATURATION: 99 % | TEMPERATURE: 98.1 F

## 2023-08-17 DIAGNOSIS — J01.00 SUBACUTE MAXILLARY SINUSITIS: ICD-10-CM

## 2023-08-17 DIAGNOSIS — E03.9 ACQUIRED HYPOTHYROIDISM: ICD-10-CM

## 2023-08-17 DIAGNOSIS — E78.5 DYSLIPIDEMIA, GOAL LDL BELOW 70: ICD-10-CM

## 2023-08-17 DIAGNOSIS — H93.8X1 EAR FULLNESS, RIGHT: Primary | ICD-10-CM

## 2023-08-17 DIAGNOSIS — R73.01 IFG (IMPAIRED FASTING GLUCOSE): ICD-10-CM

## 2023-08-17 DIAGNOSIS — I10 HTN, GOAL BELOW 130/80: ICD-10-CM

## 2023-08-17 PROBLEM — J47.9 BRONCHIECTASIS, UNCOMPLICATED (HCC): Status: ACTIVE | Noted: 2023-08-17

## 2023-08-17 PROCEDURE — 3074F SYST BP LT 130 MM HG: CPT | Performed by: INTERNAL MEDICINE

## 2023-08-17 PROCEDURE — 99214 OFFICE O/P EST MOD 30 MIN: CPT | Performed by: INTERNAL MEDICINE

## 2023-08-17 PROCEDURE — 1123F ACP DISCUSS/DSCN MKR DOCD: CPT | Performed by: INTERNAL MEDICINE

## 2023-08-17 PROCEDURE — G8399 PT W/DXA RESULTS DOCUMENT: HCPCS | Performed by: INTERNAL MEDICINE

## 2023-08-17 PROCEDURE — G8428 CUR MEDS NOT DOCUMENT: HCPCS | Performed by: INTERNAL MEDICINE

## 2023-08-17 PROCEDURE — G8420 CALC BMI NORM PARAMETERS: HCPCS | Performed by: INTERNAL MEDICINE

## 2023-08-17 PROCEDURE — 1090F PRES/ABSN URINE INCON ASSESS: CPT | Performed by: INTERNAL MEDICINE

## 2023-08-17 PROCEDURE — 4004F PT TOBACCO SCREEN RCVD TLK: CPT | Performed by: INTERNAL MEDICINE

## 2023-08-17 PROCEDURE — 3017F COLORECTAL CA SCREEN DOC REV: CPT | Performed by: INTERNAL MEDICINE

## 2023-08-17 PROCEDURE — 3078F DIAST BP <80 MM HG: CPT | Performed by: INTERNAL MEDICINE

## 2023-08-17 RX ORDER — AZITHROMYCIN 250 MG/1
250 TABLET, FILM COATED ORAL SEE ADMIN INSTRUCTIONS
Qty: 6 TABLET | Refills: 0 | Status: SHIPPED | OUTPATIENT
Start: 2023-08-17 | End: 2023-08-22

## 2023-08-17 ASSESSMENT — PATIENT HEALTH QUESTIONNAIRE - PHQ9
1. LITTLE INTEREST OR PLEASURE IN DOING THINGS: 0
2. FEELING DOWN, DEPRESSED OR HOPELESS: 0
SUM OF ALL RESPONSES TO PHQ QUESTIONS 1-9: 0
SUM OF ALL RESPONSES TO PHQ QUESTIONS 1-9: 0
SUM OF ALL RESPONSES TO PHQ9 QUESTIONS 1 & 2: 0
SUM OF ALL RESPONSES TO PHQ QUESTIONS 1-9: 0
SUM OF ALL RESPONSES TO PHQ QUESTIONS 1-9: 0

## 2023-09-17 DIAGNOSIS — I10 ESSENTIAL (PRIMARY) HYPERTENSION: ICD-10-CM

## 2023-09-17 RX ORDER — TELMISARTAN 40 MG/1
40 TABLET ORAL DAILY
Qty: 90 TABLET | Refills: 1 | Status: SHIPPED | OUTPATIENT
Start: 2023-09-17

## 2023-09-18 RX ORDER — EZETIMIBE 10 MG/1
10 TABLET ORAL DAILY
Qty: 90 TABLET | Refills: 2 | Status: SHIPPED | OUTPATIENT
Start: 2023-09-18

## 2023-09-18 NOTE — TELEPHONE ENCOUNTER
Requested Prescriptions     Signed Prescriptions Disp Refills    ezetimibe (ZETIA) 10 MG tablet 90 tablet 2     Sig: TAKE ONE TABLET BY MOUTH DAILY     Authorizing Provider: Maxim Vincent     Ordering User: Petey Olson     Verbal order per Dr. Sherry Noland.     Future Appointments   Date Time Provider [address]  [address]   5/15/2024 11:40 AM MD BECKY Glover AMB

## 2023-09-20 LAB
ALBUMIN SERPL-MCNC: 4.7 G/DL (ref 3.8–4.8)
ALP SERPL-CCNC: 76 IU/L (ref 44–121)
ALT SERPL-CCNC: 27 IU/L (ref 0–32)
AST SERPL-CCNC: 20 IU/L (ref 0–40)
BILIRUB DIRECT SERPL-MCNC: 0.15 MG/DL (ref 0–0.4)
BILIRUB SERPL-MCNC: 0.4 MG/DL (ref 0–1.2)
CHOLEST SERPL-MCNC: 141 MG/DL (ref 100–199)
HBA1C MFR BLD: 6.3 % (ref 4.8–5.6)
HDLC SERPL-MCNC: 51 MG/DL
LDLC SERPL CALC-MCNC: 63 MG/DL (ref 0–99)
PROT SERPL-MCNC: 6.8 G/DL (ref 6–8.5)
TRIGL SERPL-MCNC: 163 MG/DL (ref 0–149)
VLDLC SERPL CALC-MCNC: 27 MG/DL (ref 5–40)

## 2023-09-29 ENCOUNTER — HOSPITAL ENCOUNTER (OUTPATIENT)
Age: 73
End: 2023-09-29
Payer: MEDICARE

## 2023-09-29 ENCOUNTER — OFFICE VISIT (OUTPATIENT)
Age: 73
End: 2023-09-29
Payer: MEDICARE

## 2023-09-29 ENCOUNTER — TELEPHONE (OUTPATIENT)
Age: 73
End: 2023-09-29

## 2023-09-29 VITALS
DIASTOLIC BLOOD PRESSURE: 75 MMHG | OXYGEN SATURATION: 95 % | SYSTOLIC BLOOD PRESSURE: 170 MMHG | BODY MASS INDEX: 23.19 KG/M2 | HEART RATE: 59 BPM | RESPIRATION RATE: 16 BRPM | WEIGHT: 144.3 LBS | HEIGHT: 66 IN | TEMPERATURE: 98.3 F

## 2023-09-29 DIAGNOSIS — R10.11 RUQ PAIN: ICD-10-CM

## 2023-09-29 DIAGNOSIS — J47.9 BRONCHIECTASIS, UNCOMPLICATED (HCC): ICD-10-CM

## 2023-09-29 DIAGNOSIS — J47.1 BRONCHIECTASIS WITH ACUTE EXACERBATION (HCC): Primary | ICD-10-CM

## 2023-09-29 DIAGNOSIS — R07.9 RIGHT-SIDED CHEST PAIN: ICD-10-CM

## 2023-09-29 DIAGNOSIS — I10 HTN, GOAL BELOW 130/80: ICD-10-CM

## 2023-09-29 DIAGNOSIS — R07.9 RIGHT-SIDED CHEST PAIN: Primary | ICD-10-CM

## 2023-09-29 LAB
ALBUMIN SERPL-MCNC: 4 G/DL (ref 3.5–5)
ALBUMIN/GLOB SERPL: 1.5 (ref 1.1–2.2)
ALP SERPL-CCNC: 72 U/L (ref 45–117)
ALT SERPL-CCNC: 38 U/L (ref 12–78)
ANION GAP SERPL CALC-SCNC: 6 MMOL/L (ref 5–15)
AST SERPL-CCNC: 21 U/L (ref 15–37)
BASOPHILS # BLD: 0 K/UL (ref 0–0.1)
BASOPHILS NFR BLD: 0 % (ref 0–1)
BILIRUB SERPL-MCNC: 0.5 MG/DL (ref 0.2–1)
BUN SERPL-MCNC: 16 MG/DL (ref 6–20)
BUN/CREAT SERPL: 22 (ref 12–20)
CALCIUM SERPL-MCNC: 9.1 MG/DL (ref 8.5–10.1)
CHLORIDE SERPL-SCNC: 104 MMOL/L (ref 97–108)
CO2 SERPL-SCNC: 26 MMOL/L (ref 21–32)
CREAT SERPL-MCNC: 0.73 MG/DL (ref 0.55–1.02)
DIFFERENTIAL METHOD BLD: ABNORMAL
EOSINOPHIL # BLD: 0.6 K/UL (ref 0–0.4)
EOSINOPHIL NFR BLD: 5 % (ref 0–7)
ERYTHROCYTE [DISTWIDTH] IN BLOOD BY AUTOMATED COUNT: 11.9 % (ref 11.5–14.5)
GLOBULIN SER CALC-MCNC: 2.7 G/DL (ref 2–4)
GLUCOSE SERPL-MCNC: 114 MG/DL (ref 65–100)
HCT VFR BLD AUTO: 38.2 % (ref 35–47)
HGB BLD-MCNC: 12.8 G/DL (ref 11.5–16)
IMM GRANULOCYTES # BLD AUTO: 0 K/UL
IMM GRANULOCYTES NFR BLD AUTO: 0 %
LYMPHOCYTES # BLD: 5.3 K/UL (ref 0.8–3.5)
LYMPHOCYTES NFR BLD: 49 % (ref 12–49)
MCH RBC QN AUTO: 29.8 PG (ref 26–34)
MCHC RBC AUTO-ENTMCNC: 33.5 G/DL (ref 30–36.5)
MCV RBC AUTO: 89 FL (ref 80–99)
MONOCYTES # BLD: 0.7 K/UL (ref 0–1)
MONOCYTES NFR BLD: 6 % (ref 5–13)
NEUTS SEG # BLD: 4.4 K/UL (ref 1.8–8)
NEUTS SEG NFR BLD: 40 % (ref 32–75)
NRBC # BLD: 0 K/UL (ref 0–0.01)
NRBC BLD-RTO: 0 PER 100 WBC
PLATELET # BLD AUTO: 200 K/UL (ref 150–400)
PMV BLD AUTO: 10.3 FL (ref 8.9–12.9)
POTASSIUM SERPL-SCNC: 4.2 MMOL/L (ref 3.5–5.1)
PROT SERPL-MCNC: 6.7 G/DL (ref 6.4–8.2)
RBC # BLD AUTO: 4.29 M/UL (ref 3.8–5.2)
RBC MORPH BLD: ABNORMAL
SODIUM SERPL-SCNC: 136 MMOL/L (ref 136–145)
WBC # BLD AUTO: 11 K/UL (ref 3.6–11)
WBC MORPH BLD: ABNORMAL

## 2023-09-29 PROCEDURE — 3078F DIAST BP <80 MM HG: CPT | Performed by: INTERNAL MEDICINE

## 2023-09-29 PROCEDURE — 99214 OFFICE O/P EST MOD 30 MIN: CPT | Performed by: INTERNAL MEDICINE

## 2023-09-29 PROCEDURE — 1090F PRES/ABSN URINE INCON ASSESS: CPT | Performed by: INTERNAL MEDICINE

## 2023-09-29 PROCEDURE — 1123F ACP DISCUSS/DSCN MKR DOCD: CPT | Performed by: INTERNAL MEDICINE

## 2023-09-29 PROCEDURE — 6360000004 HC RX CONTRAST MEDICATION: Performed by: RADIOLOGY

## 2023-09-29 PROCEDURE — G8399 PT W/DXA RESULTS DOCUMENT: HCPCS | Performed by: INTERNAL MEDICINE

## 2023-09-29 PROCEDURE — 3077F SYST BP >= 140 MM HG: CPT | Performed by: INTERNAL MEDICINE

## 2023-09-29 PROCEDURE — 4004F PT TOBACCO SCREEN RCVD TLK: CPT | Performed by: INTERNAL MEDICINE

## 2023-09-29 PROCEDURE — G8427 DOCREV CUR MEDS BY ELIG CLIN: HCPCS | Performed by: INTERNAL MEDICINE

## 2023-09-29 PROCEDURE — 71260 CT THORAX DX C+: CPT

## 2023-09-29 PROCEDURE — 3017F COLORECTAL CA SCREEN DOC REV: CPT | Performed by: INTERNAL MEDICINE

## 2023-09-29 PROCEDURE — G8420 CALC BMI NORM PARAMETERS: HCPCS | Performed by: INTERNAL MEDICINE

## 2023-09-29 RX ORDER — AZITHROMYCIN 250 MG/1
250 TABLET, FILM COATED ORAL SEE ADMIN INSTRUCTIONS
Qty: 6 TABLET | Refills: 0 | Status: SHIPPED | OUTPATIENT
Start: 2023-09-29 | End: 2023-10-04

## 2023-09-29 RX ADMIN — IOPAMIDOL 100 ML: 755 INJECTION, SOLUTION INTRAVENOUS at 14:52

## 2023-09-29 NOTE — PROGRESS NOTES
Hayes Pedersen (:  1950) is a 68 y.o. female,Established patient, here for evaluation of the following chief complaint(s):  Pain (Patient here due to complaints of upper (R) lung pain. )         ASSESSMENT/PLAN:  1. Right-sided chest pain-differential includes abscess and bronchiectasis and lower rib injury  Check ct and wbc  Discussed if fevers or hemoptysis or sob develops needs er eval  -     CBC with Auto Differential; Future  -     CT CHEST W CONTRAST; Future  2. Bronchiectasis, uncomplicated ([address])  -     Comprehensive Metabolic Panel; Future  -     CT CHEST W CONTRAST; Future  3. HTN, goal below 130/80-level is elevated today needs close follow up  -     Comprehensive Metabolic Panel; Future  4. RUQ pain-lft normal a few weeks ago will repeat  Avoid  nsaids until more information back  -     Harriett Simons S; Future      No follow-ups on file. Subjective   SUBJECTIVE/OBJECTIVE:  Pain      Peggy is seen with a month of pain and discomfort right-sided chest and right upper quadrant. She notes it started about 3 to 4 days after seeing me  for an ear infection and was given Zithromax. She has not had fevers. Has had some sweats. Cough is nonproductive. No hemoptysis. No vomiting. Did have nausea and vomiting in the spring with using Advil. No change in appetite. She notes that her  hand flopped onto her right chest wall around this time and at first she thought she might have rib bruising. Pain does diminish with Tylenol but she is reluctant to use it. Does have a history of bronchiectasis on imaging with pulmonary from . No purulent phlegm. No change in bowels. Does smoke about 2 per day. No tobacco.  Review of Systems       Objective   Physical Exam  Constitutional:       Appearance: Normal appearance. HENT:      Head: Normocephalic and atraumatic.       Right Ear: Tympanic membrane normal.      Left Ear: Tympanic membrane normal.   Cardiovascular:      Rate

## 2023-09-29 NOTE — TELEPHONE ENCOUNTER
I called to tell her about her ct report stable from last year but does show bronchiectasis  Advised take abx   Advised tobacco cessation  Advised appt in 3 weeks if not better

## 2023-09-30 ENCOUNTER — HOSPITAL ENCOUNTER (OUTPATIENT)
Facility: HOSPITAL | Age: 73
Discharge: HOME | End: 2023-09-30
Attending: INTERNAL MEDICINE
Payer: MEDICARE

## 2023-09-30 ENCOUNTER — PATIENT MESSAGE (OUTPATIENT)
Age: 73
End: 2023-09-30

## 2023-09-30 DIAGNOSIS — J30.1 NON-SEASONAL ALLERGIC RHINITIS DUE TO POLLEN: Primary | ICD-10-CM

## 2023-09-30 DIAGNOSIS — R10.11 RUQ PAIN: ICD-10-CM

## 2023-09-30 PROCEDURE — 76705 ECHO EXAM OF ABDOMEN: CPT

## 2023-10-02 RX ORDER — MONTELUKAST SODIUM 10 MG/1
10 TABLET ORAL DAILY
Qty: 90 TABLET | Refills: 3 | Status: SHIPPED | OUTPATIENT
Start: 2023-10-02

## 2023-10-02 NOTE — TELEPHONE ENCOUNTER
From: Amanda Magaña  To: Dr. Dixon Jolley: 2023 9:47 AM EDT  Subject: Medication    Thank you Dr Lina Zavala for calling me on Friday with my test results. I was concerned. Could you call in a refill for my montelukast 10 MG tablet. The Rx has . Thanks again and have a great weekend.   Paxton Conte

## 2023-11-01 DIAGNOSIS — E03.9 HYPOTHYROIDISM, UNSPECIFIED: ICD-10-CM

## 2023-11-01 DIAGNOSIS — I10 ESSENTIAL (PRIMARY) HYPERTENSION: ICD-10-CM

## 2023-11-01 RX ORDER — ATENOLOL 100 MG/1
100 TABLET ORAL DAILY
Qty: 90 TABLET | Refills: 0 | Status: SHIPPED | OUTPATIENT
Start: 2023-11-01

## 2023-11-01 RX ORDER — LEVOTHYROXINE SODIUM 0.07 MG/1
75 TABLET ORAL
Qty: 90 TABLET | Refills: 0 | Status: SHIPPED | OUTPATIENT
Start: 2023-11-01

## 2023-12-25 RX ORDER — HYDROCHLOROTHIAZIDE 25 MG/1
25 TABLET ORAL DAILY
Qty: 90 TABLET | Refills: 1 | Status: SHIPPED | OUTPATIENT
Start: 2023-12-25

## 2024-01-22 ENCOUNTER — OFFICE VISIT (OUTPATIENT)
Age: 74
End: 2024-01-22
Payer: MEDICARE

## 2024-01-22 ENCOUNTER — ANCILLARY PROCEDURE (OUTPATIENT)
Age: 74
End: 2024-01-22
Payer: MEDICARE

## 2024-01-22 VITALS
BODY MASS INDEX: 22.92 KG/M2 | SYSTOLIC BLOOD PRESSURE: 138 MMHG | HEART RATE: 59 BPM | WEIGHT: 142.6 LBS | HEIGHT: 66 IN | DIASTOLIC BLOOD PRESSURE: 88 MMHG | OXYGEN SATURATION: 98 %

## 2024-01-22 DIAGNOSIS — I25.10 CORONARY ARTERY DISEASE INVOLVING NATIVE CORONARY ARTERY OF NATIVE HEART WITHOUT ANGINA PECTORIS: Primary | ICD-10-CM

## 2024-01-22 DIAGNOSIS — I10 PRIMARY HYPERTENSION: ICD-10-CM

## 2024-01-22 DIAGNOSIS — I65.23 BILATERAL CAROTID ARTERY STENOSIS: ICD-10-CM

## 2024-01-22 DIAGNOSIS — R93.1 AGATSTON CAC SCORE, >400: ICD-10-CM

## 2024-01-22 DIAGNOSIS — I65.29 CAROTID STENOSIS: ICD-10-CM

## 2024-01-22 DIAGNOSIS — E78.00 HYPERCHOLESTEROLEMIA: ICD-10-CM

## 2024-01-22 PROCEDURE — 93880 EXTRACRANIAL BILAT STUDY: CPT | Performed by: SPECIALIST

## 2024-01-22 PROCEDURE — 99214 OFFICE O/P EST MOD 30 MIN: CPT | Performed by: SPECIALIST

## 2024-01-22 PROCEDURE — 3075F SYST BP GE 130 - 139MM HG: CPT | Performed by: SPECIALIST

## 2024-01-22 PROCEDURE — 1090F PRES/ABSN URINE INCON ASSESS: CPT | Performed by: SPECIALIST

## 2024-01-22 PROCEDURE — 3017F COLORECTAL CA SCREEN DOC REV: CPT | Performed by: SPECIALIST

## 2024-01-22 PROCEDURE — 1123F ACP DISCUSS/DSCN MKR DOCD: CPT | Performed by: SPECIALIST

## 2024-01-22 PROCEDURE — 3079F DIAST BP 80-89 MM HG: CPT | Performed by: SPECIALIST

## 2024-01-22 PROCEDURE — 4004F PT TOBACCO SCREEN RCVD TLK: CPT | Performed by: SPECIALIST

## 2024-01-22 PROCEDURE — G8427 DOCREV CUR MEDS BY ELIG CLIN: HCPCS | Performed by: SPECIALIST

## 2024-01-22 PROCEDURE — G8420 CALC BMI NORM PARAMETERS: HCPCS | Performed by: SPECIALIST

## 2024-01-22 PROCEDURE — G8399 PT W/DXA RESULTS DOCUMENT: HCPCS | Performed by: SPECIALIST

## 2024-01-22 PROCEDURE — G8484 FLU IMMUNIZE NO ADMIN: HCPCS | Performed by: SPECIALIST

## 2024-01-22 ASSESSMENT — PATIENT HEALTH QUESTIONNAIRE - PHQ9
2. FEELING DOWN, DEPRESSED OR HOPELESS: 0
SUM OF ALL RESPONSES TO PHQ QUESTIONS 1-9: 0
SUM OF ALL RESPONSES TO PHQ QUESTIONS 1-9: 0
SUM OF ALL RESPONSES TO PHQ9 QUESTIONS 1 & 2: 0
1. LITTLE INTEREST OR PLEASURE IN DOING THINGS: 0
SUM OF ALL RESPONSES TO PHQ QUESTIONS 1-9: 0
SUM OF ALL RESPONSES TO PHQ QUESTIONS 1-9: 0

## 2024-01-22 NOTE — PROGRESS NOTES
Peggy Garcia     1950       Monica Gan MD, [facility]  Date of Visit-2024   PCP is Mary Keita MD   [facility] [place]  [facility]  HPI:  Peggy Garcia is a 73 y.o. female   Half year follow up   Had carotids today:  Mild (<50%) stenosis in the right internal carotid artery.    Moderate (50-69%) stenosis in the left internal carotid artery.    Normal antegrade flow involving the bilateral vertebral artery.    Today   Feels pretty good  No new CV issues  Had questions about AAA risk with mother had AAA and patient was a smoker  Previous US Abd in Sept did not look vascular  CT chest did not show aneurysm  Has some arthritis issues, Dr Morris. and uses Voltaren cream  Had 2 episodes of vertigo which have resolved.  Had a URI and sinus infection.  Sometimes Motrin causes her nausea.    CAD Cath 18  LAD proximal 60%.  FFR not significant.     Pt has quit smoking since 2018.  Calcium score 2,039  Mild carotid artery disease    Labs showed a hemoglobin A1c in 6.3% 2023 and normal LFTs.  Lipids:  Lab Results   Component Value Date    CHOL 141 2023    TRIG 163 (H) 2023    HDL 51 2023    LDLCALC 63 2023    LABVLDL 27 2023    VLDL 18 2023        Assessment/Plan:       Patient Instructions   One year with carotids and lipids.  Also lipids in 6 months  AAA study to be ordered - do not eat or drink anything besides water 8 hours prior.   Cancel May appt     1. Coronary artery disease involving native coronary artery of native heart without angina pectoris  Cath  LAD 60% done for elevated  Ca++ score, FFR negative  Nuclear stress test 2021 EF 71%  showed no ischemia  Has no angina  Continue medical management with beta-blocker/aspirin/cholesterol reduction    2. Agatston CAC score, >400  Ca score was   Very high elevated score, medical management goal LDL below 70.    3. Hypercholesterolemia  LDL

## 2024-01-22 NOTE — PATIENT INSTRUCTIONS
One year with carotids and lipids.  Also lipids in 6 months  AAA study to be ordered - do not eat or drink anything besides water 8 hours prior.   Cancel May appt

## 2024-01-25 DIAGNOSIS — E03.9 HYPOTHYROIDISM, UNSPECIFIED: ICD-10-CM

## 2024-01-25 RX ORDER — LEVOTHYROXINE SODIUM 0.07 MG/1
75 TABLET ORAL
Qty: 90 TABLET | Refills: 0 | Status: SHIPPED | OUTPATIENT
Start: 2024-01-25

## 2024-01-26 DIAGNOSIS — I10 ESSENTIAL (PRIMARY) HYPERTENSION: ICD-10-CM

## 2024-01-26 RX ORDER — ATENOLOL 100 MG/1
100 TABLET ORAL DAILY
Qty: 90 TABLET | Refills: 0 | Status: SHIPPED | OUTPATIENT
Start: 2024-01-26

## 2024-01-29 LAB
VAS LEFT CCA DIST EDV: 18 CM/S
VAS LEFT CCA DIST PSV: 67.7 CM/S
VAS LEFT CCA PROX EDV: 22.3 CM/S
VAS LEFT CCA PROX PSV: 82.5 CM/S
VAS LEFT ECA EDV: 14.5 CM/S
VAS LEFT ECA PSV: 201 CM/S
VAS LEFT ICA DIST EDV: 20.7 CM/S
VAS LEFT ICA DIST PSV: 61.3 CM/S
VAS LEFT ICA MID EDV: 24.1 CM/S
VAS LEFT ICA MID PSV: 122.4 CM/S
VAS LEFT ICA PROX EDV: 29.2 CM/S
VAS LEFT ICA PROX PSV: 166.4 CM/S
VAS LEFT ICA/CCA PSV: 2.5 NO UNITS
VAS LEFT VERTEBRAL EDV: 10.1 CM/S
VAS LEFT VERTEBRAL PSV: 44.7 CM/S
VAS RIGHT CCA DIST EDV: 14.1 CM/S
VAS RIGHT CCA DIST PSV: 57.5 CM/S
VAS RIGHT CCA PROX EDV: 16.8 CM/S
VAS RIGHT CCA PROX PSV: 97.7 CM/S
VAS RIGHT ECA EDV: 19.7 CM/S
VAS RIGHT ECA PSV: 211.4 CM/S
VAS RIGHT ICA DIST EDV: 15.4 CM/S
VAS RIGHT ICA DIST PSV: 60.7 CM/S
VAS RIGHT ICA MID EDV: 12.7 CM/S
VAS RIGHT ICA MID PSV: 50.1 CM/S
VAS RIGHT ICA PROX EDV: 13.8 CM/S
VAS RIGHT ICA PROX PSV: 53.4 CM/S
VAS RIGHT ICA/CCA PSV: 1.1 NO UNITS
VAS RIGHT VERTEBRAL EDV: 7.5 CM/S
VAS RIGHT VERTEBRAL PSV: 44.1 CM/S

## 2024-01-29 PROCEDURE — 93880 EXTRACRANIAL BILAT STUDY: CPT | Performed by: SPECIALIST

## 2024-02-19 ENCOUNTER — ANCILLARY PROCEDURE (OUTPATIENT)
Age: 74
End: 2024-02-19
Payer: MEDICARE

## 2024-02-19 DIAGNOSIS — Z82.49 FAMILY HISTORY OF ABDOMINAL AORTIC ANEURYSM (AAA): ICD-10-CM

## 2024-02-19 LAB
VAS AORTA DIST AP: 1 CM
VAS AORTA DIST PSV: 106.8 CM/S
VAS AORTA DIST TR: 1.11 CM
VAS AORTA MID AP: 1.43 CM
VAS AORTA MID PSV: 117.1 CM/S
VAS AORTA MID TRANS: 1.51 CM
VAS AORTA PROX AP: 1.41 CM
VAS AORTA PROX PSV: 96.4 CM/S
VAS AORTA PROX TR: 1.68 CM
VAS LEFT COM ILIAC AP: 0.73 CM
VAS LEFT COM ILIAC PROX PSV: 282.1 CM/S
VAS LEFT COM ILIAC TRANS: 0.73 CM
VAS RIGHT COM ILIAC AP: 0.69 CM
VAS RIGHT COM ILIAC PROX PSV: 168.9 CM/S
VAS RIGHT COM ILIAC TRANS: 0.7 CM

## 2024-02-19 PROCEDURE — 76706 US ABDL AORTA SCREEN AAA: CPT | Performed by: SPECIALIST

## 2024-02-28 RX ORDER — NIRMATRELVIR AND RITONAVIR 300-100 MG
KIT ORAL
Qty: 30 TABLET | Refills: 0 | Status: SHIPPED | OUTPATIENT
Start: 2024-02-28

## 2024-03-14 ENCOUNTER — TELEMEDICINE (OUTPATIENT)
Age: 74
End: 2024-03-14
Payer: MEDICARE

## 2024-03-14 DIAGNOSIS — F43.21 GRIEF: ICD-10-CM

## 2024-03-14 DIAGNOSIS — J47.9 BRONCHIECTASIS, UNCOMPLICATED (HCC): ICD-10-CM

## 2024-03-14 DIAGNOSIS — I25.119 ATHEROSCLEROSIS OF NATIVE CORONARY ARTERY OF NATIVE HEART WITH ANGINA PECTORIS (HCC): ICD-10-CM

## 2024-03-14 DIAGNOSIS — U07.1 COVID: Primary | ICD-10-CM

## 2024-03-14 PROBLEM — I20.9 ANGINA PECTORIS, UNSPECIFIED (HCC): Status: ACTIVE | Noted: 2024-03-14

## 2024-03-14 PROCEDURE — G8428 CUR MEDS NOT DOCUMENT: HCPCS | Performed by: INTERNAL MEDICINE

## 2024-03-14 PROCEDURE — 99213 OFFICE O/P EST LOW 20 MIN: CPT | Performed by: INTERNAL MEDICINE

## 2024-03-14 PROCEDURE — 3017F COLORECTAL CA SCREEN DOC REV: CPT | Performed by: INTERNAL MEDICINE

## 2024-03-14 PROCEDURE — G8399 PT W/DXA RESULTS DOCUMENT: HCPCS | Performed by: INTERNAL MEDICINE

## 2024-03-14 PROCEDURE — G8484 FLU IMMUNIZE NO ADMIN: HCPCS | Performed by: INTERNAL MEDICINE

## 2024-03-14 PROCEDURE — 4004F PT TOBACCO SCREEN RCVD TLK: CPT | Performed by: INTERNAL MEDICINE

## 2024-03-14 PROCEDURE — G8420 CALC BMI NORM PARAMETERS: HCPCS | Performed by: INTERNAL MEDICINE

## 2024-03-14 PROCEDURE — 1123F ACP DISCUSS/DSCN MKR DOCD: CPT | Performed by: INTERNAL MEDICINE

## 2024-03-14 PROCEDURE — 1090F PRES/ABSN URINE INCON ASSESS: CPT | Performed by: INTERNAL MEDICINE

## 2024-03-14 RX ORDER — AZITHROMYCIN 250 MG/1
TABLET, FILM COATED ORAL
Qty: 6 TABLET | Refills: 0 | Status: SHIPPED | OUTPATIENT
Start: 2024-03-14 | End: 2024-03-24

## 2024-03-14 NOTE — PROGRESS NOTES
VV-Pt is aware of billable appt depending on insurance plan.        Pt verbally agrees to labs, orders, and others to be mailed to confirmed home address.    Identified pt with two pt identifiers(name and ). Reviewed record in preparation for visit and have obtained necessary documentation. All patient medications has been reviewed.  Chief Complaint   Patient presents with    Positive For Covid-19     Pt tested positive for COVID today. Runny nose, congestion, scratchy throat, slight headache and loose stools.       Health Maintenance Review: Patient reminded of \"due or due soon\" health maintenance. I have asked the patient to contact his/her primary care provider (PCP) for follow-up on his/her health maintenance.         No data to display                 Wt Readings from Last 3 Encounters:   24 64.7 kg (142 lb 9.6 oz)   23 65.5 kg (144 lb 4.8 oz)   23 62.9 kg (138 lb 9.6 oz)     Temp Readings from Last 3 Encounters:   23 98.3 °F (36.8 °C) (Oral)   23 98.1 °F (36.7 °C) (Oral)     BP Readings from Last 3 Encounters:   24 138/88   23 (!) 170/75   23 124/74     Pulse Readings from Last 3 Encounters:   24 59   23 59   23 59         Coordination of Care Questionnaire:   1) Have you been to an emergency room, urgent care, or hospitalized since your last visit?   no     2. Have seen or consulted any other health care provider since your last visit? no    
  Patient-Reported Vitals  Patient-Reported Systolic (Top): 154 mmHg  Patient-Reported Diastolic (Bottom): 71 mmHg  BP Observations: Yes, BP was taken on electronic monitoring device with digital readout  Patient-Reported Pulse: 68  Patient-Reported Temperature: 97.8  Patient-Reported Pulse Oximetry: 98       Physical Exam  [INSTRUCTIONS:  \"[x]\" Indicates a positive item  \"[]\" Indicates a negative item  -- DELETE ALL ITEMS NOT EXAMINED]    Constitutional: [x] Appears well-developed and well-nourished [x] No apparent distress      [] Abnormal -     Mental status: [x] Alert and awake  [x] Oriented to person/place/time [x] Able to follow commands    [] Abnormal -     Eyes:   EOM    [x]  Normal    [] Abnormal -   Sclera  [x]  Normal    [] Abnormal -          Discharge [x]  None visible   [] Abnormal -     HENT: [x] Normocephalic, atraumatic  [] Abnormal -   [x] Mouth/Throat: Mucous membranes are moist    External Ears [x] Normal  [] Abnormal -    Neck: [x] No visualized mass [] Abnormal -     Pulmonary/Chest: [x] Respiratory effort normal   [x] No visualized signs of difficulty breathing or respiratory distress        [] Abnormal -      Musculoskeletal:   [x] Normal gait with no signs of ataxia         [x] Normal range of motion of neck        [] Abnormal -     Neurological:        [x] No Facial Asymmetry (Cranial nerve 7 motor function) (limited exam due to video visit)          [x] No gaze palsy        [] Abnormal -          Skin:        [x] No significant exanthematous lesions or discoloration noted on facial skin         [] Abnormal -            Psychiatric:       [x] Normal Affect [] Abnormal -        [x] No Hallucinations    Other pertinent observable physical exam findings:-             --Mary Keita MD

## 2024-03-23 DIAGNOSIS — I10 ESSENTIAL (PRIMARY) HYPERTENSION: ICD-10-CM

## 2024-03-25 RX ORDER — TELMISARTAN 40 MG/1
40 TABLET ORAL DAILY
Qty: 90 TABLET | Refills: 0 | Status: SHIPPED | OUTPATIENT
Start: 2024-03-25

## 2024-03-26 ENCOUNTER — OFFICE VISIT (OUTPATIENT)
Age: 74
End: 2024-03-26

## 2024-03-26 VITALS
TEMPERATURE: 98.6 F | DIASTOLIC BLOOD PRESSURE: 79 MMHG | BODY MASS INDEX: 23.77 KG/M2 | WEIGHT: 147.3 LBS | OXYGEN SATURATION: 99 % | SYSTOLIC BLOOD PRESSURE: 178 MMHG | HEART RATE: 67 BPM | RESPIRATION RATE: 16 BRPM

## 2024-03-26 DIAGNOSIS — J01.90 ACUTE NON-RECURRENT SINUSITIS, UNSPECIFIED LOCATION: Primary | ICD-10-CM

## 2024-03-26 RX ORDER — PREDNISONE 20 MG/1
20 TABLET ORAL 2 TIMES DAILY
Qty: 10 TABLET | Refills: 0 | Status: SHIPPED | OUTPATIENT
Start: 2024-03-26 | End: 2024-03-31

## 2024-03-26 RX ORDER — AMOXICILLIN AND CLAVULANATE POTASSIUM 875; 125 MG/1; MG/1
1 TABLET, FILM COATED ORAL 2 TIMES DAILY
Qty: 14 TABLET | Refills: 0 | Status: SHIPPED | OUTPATIENT
Start: 2024-03-26 | End: 2024-04-02

## 2024-03-26 ASSESSMENT — ENCOUNTER SYMPTOMS
SINUS COMPLAINT: 1
CHEST TIGHTNESS: 0
COUGH: 1
SORE THROAT: 0
SHORTNESS OF BREATH: 0
FACIAL SWELLING: 1
HOARSE VOICE: 1
SINUS PAIN: 1
SINUS PRESSURE: 1

## 2024-03-26 NOTE — PROGRESS NOTES
Chief Complaint   Patient presents with    Sinusitis     Patient dx with COVID - 19 x 6 weeks ago, head congestion, runny nose, sinus pressure, swollen glands, dizziness x 6 weeks          Sinus Problem  This is a new problem. The current episode started more than 1 month ago. The problem is unchanged. There has been no fever. Her pain is at a severity of 6/10. The pain is moderate. Associated symptoms include congestion, coughing, headaches, a hoarse voice and sinus pressure. Pertinent negatives include no chills, shortness of breath or sore throat. Past treatments include nothing.       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    Diverticulitis 7/28/2009    Ex-smoker 6/4/2018    Hypercholesterolemia 7/28/2009    Hypothyroid 7/28/2009       Past Surgical History:   Procedure Laterality Date    ORTHOPEDIC SURGERY      anterior cervical discectomy and fusion       Social History     Tobacco Use    Smoking status: Some Days     Current packs/day: 0.50     Types: Cigarettes    Smokeless tobacco: Never   Substance Use Topics    Alcohol use: Yes     Alcohol/week: 3.3 standard drinks of alcohol    Drug use: No        Allergies   Allergen Reactions    Codeine Nausea And Vomiting    Motrin [Ibuprofen] Nausea And Vomiting        Review of Systems   Constitutional:  Positive for fatigue. Negative for chills.   HENT:  Positive for congestion, facial swelling, hoarse voice, sinus pressure and sinus pain. Negative for dental problem and sore throat.    Respiratory:  Positive for cough. Negative for chest tightness and shortness of breath.    Neurological:  Positive for headaches.   All other systems reviewed and are negative.       BP (!) 178/79 (Site: Left Upper Arm, Position: Sitting,

## 2024-03-26 NOTE — PATIENT INSTRUCTIONS
Mucinex Fast max 2 tab 4 times/ day - day/ night pack   Flonase spray   Zyrtec/ Allegra daily   Saline sinus rinse

## 2024-04-24 DIAGNOSIS — I10 ESSENTIAL (PRIMARY) HYPERTENSION: ICD-10-CM

## 2024-04-24 RX ORDER — ATENOLOL 100 MG/1
100 TABLET ORAL DAILY
Qty: 30 TABLET | Refills: 0 | Status: SHIPPED | OUTPATIENT
Start: 2024-04-24

## 2024-04-25 DIAGNOSIS — E03.9 HYPOTHYROIDISM, UNSPECIFIED: ICD-10-CM

## 2024-04-25 RX ORDER — LEVOTHYROXINE SODIUM 0.07 MG/1
75 TABLET ORAL
Qty: 90 TABLET | Refills: 0 | Status: SHIPPED | OUTPATIENT
Start: 2024-04-25

## 2024-04-26 ENCOUNTER — TRANSCRIBE ORDERS (OUTPATIENT)
Facility: HOSPITAL | Age: 74
End: 2024-04-26

## 2024-04-26 ENCOUNTER — HOSPITAL ENCOUNTER (OUTPATIENT)
Facility: HOSPITAL | Age: 74
End: 2024-04-26
Payer: MEDICARE

## 2024-04-26 ENCOUNTER — OFFICE VISIT (OUTPATIENT)
Age: 74
End: 2024-04-26
Payer: MEDICARE

## 2024-04-26 VITALS
OXYGEN SATURATION: 98 % | RESPIRATION RATE: 16 BRPM | TEMPERATURE: 97.6 F | HEART RATE: 56 BPM | HEIGHT: 66 IN | DIASTOLIC BLOOD PRESSURE: 87 MMHG | BODY MASS INDEX: 23.14 KG/M2 | WEIGHT: 144 LBS | SYSTOLIC BLOOD PRESSURE: 168 MMHG

## 2024-04-26 DIAGNOSIS — I10 HTN, GOAL BELOW 130/80: ICD-10-CM

## 2024-04-26 DIAGNOSIS — M54.2 NECK PAIN: Primary | ICD-10-CM

## 2024-04-26 DIAGNOSIS — M54.2 CERVICALGIA: ICD-10-CM

## 2024-04-26 DIAGNOSIS — M54.2 NECK PAIN: ICD-10-CM

## 2024-04-26 DIAGNOSIS — I25.119 ATHEROSCLEROSIS OF NATIVE CORONARY ARTERY OF NATIVE HEART WITH ANGINA PECTORIS (HCC): ICD-10-CM

## 2024-04-26 DIAGNOSIS — M54.2 CERVICALGIA: Primary | ICD-10-CM

## 2024-04-26 PROCEDURE — 1090F PRES/ABSN URINE INCON ASSESS: CPT | Performed by: INTERNAL MEDICINE

## 2024-04-26 PROCEDURE — 72040 X-RAY EXAM NECK SPINE 2-3 VW: CPT

## 2024-04-26 PROCEDURE — 4004F PT TOBACCO SCREEN RCVD TLK: CPT | Performed by: INTERNAL MEDICINE

## 2024-04-26 PROCEDURE — 3077F SYST BP >= 140 MM HG: CPT | Performed by: INTERNAL MEDICINE

## 2024-04-26 PROCEDURE — G8428 CUR MEDS NOT DOCUMENT: HCPCS | Performed by: INTERNAL MEDICINE

## 2024-04-26 PROCEDURE — 3078F DIAST BP <80 MM HG: CPT | Performed by: INTERNAL MEDICINE

## 2024-04-26 PROCEDURE — 99214 OFFICE O/P EST MOD 30 MIN: CPT | Performed by: INTERNAL MEDICINE

## 2024-04-26 PROCEDURE — 1123F ACP DISCUSS/DSCN MKR DOCD: CPT | Performed by: INTERNAL MEDICINE

## 2024-04-26 PROCEDURE — G8420 CALC BMI NORM PARAMETERS: HCPCS | Performed by: INTERNAL MEDICINE

## 2024-04-26 PROCEDURE — G8399 PT W/DXA RESULTS DOCUMENT: HCPCS | Performed by: INTERNAL MEDICINE

## 2024-04-26 PROCEDURE — 3017F COLORECTAL CA SCREEN DOC REV: CPT | Performed by: INTERNAL MEDICINE

## 2024-04-26 RX ORDER — DOXAZOSIN 2 MG/1
2 TABLET ORAL DAILY
Qty: 30 TABLET | Refills: 3 | Status: SHIPPED | OUTPATIENT
Start: 2024-04-26

## 2024-04-26 RX ORDER — TIZANIDINE 4 MG/1
4 TABLET ORAL NIGHTLY PRN
Qty: 10 TABLET | Refills: 0 | Status: SHIPPED | OUTPATIENT
Start: 2024-04-26

## 2024-04-26 SDOH — ECONOMIC STABILITY: FOOD INSECURITY: WITHIN THE PAST 12 MONTHS, THE FOOD YOU BOUGHT JUST DIDN'T LAST AND YOU DIDN'T HAVE MONEY TO GET MORE.: NEVER TRUE

## 2024-04-26 SDOH — ECONOMIC STABILITY: FOOD INSECURITY: WITHIN THE PAST 12 MONTHS, YOU WORRIED THAT YOUR FOOD WOULD RUN OUT BEFORE YOU GOT MONEY TO BUY MORE.: NEVER TRUE

## 2024-04-26 SDOH — ECONOMIC STABILITY: HOUSING INSECURITY
IN THE LAST 12 MONTHS, WAS THERE A TIME WHEN YOU DID NOT HAVE A STEADY PLACE TO SLEEP OR SLEPT IN A SHELTER (INCLUDING NOW)?: NO

## 2024-04-26 SDOH — ECONOMIC STABILITY: INCOME INSECURITY: HOW HARD IS IT FOR YOU TO PAY FOR THE VERY BASICS LIKE FOOD, HOUSING, MEDICAL CARE, AND HEATING?: NOT HARD AT ALL

## 2024-04-26 NOTE — PROGRESS NOTES
Left lower leg: No edema.      Comments: Decreased rom neck pain with lateral rotation to left  Tender left cervical paraspinous  muscles  No weakness in arms  Tender over left ac joint   Neurological:      General: No focal deficit present.      Mental Status: She is alert and oriented to person, place, and time.   Psychiatric:         Behavior: Behavior normal.              An electronic signature was used to authenticate this note.    --Mary Keita MD

## 2024-05-03 DIAGNOSIS — E78.00 HYPERCHOLESTEROLEMIA: ICD-10-CM

## 2024-05-03 RX ORDER — ROSUVASTATIN CALCIUM 20 MG/1
20 TABLET, COATED ORAL DAILY
Qty: 90 TABLET | Refills: 3 | Status: SHIPPED | OUTPATIENT
Start: 2024-05-03

## 2024-05-03 NOTE — TELEPHONE ENCOUNTER
Per VO by MD.    Future Appointments   Date Time Provider Department Center   8/26/2024 10:15 AM Mary Keita MD [facility] AMB   1/22/2025  1:00 PM [facility] AMB   1/22/2025  2:20 PM Monica Gan MD [facility] AMB

## 2024-05-18 DIAGNOSIS — I10 ESSENTIAL (PRIMARY) HYPERTENSION: ICD-10-CM

## 2024-05-20 RX ORDER — ATENOLOL 100 MG/1
100 TABLET ORAL DAILY
Qty: 90 TABLET | Refills: 0 | Status: SHIPPED | OUTPATIENT
Start: 2024-05-20

## 2024-06-20 DIAGNOSIS — I10 ESSENTIAL (PRIMARY) HYPERTENSION: ICD-10-CM

## 2024-06-20 RX ORDER — HYDROCHLOROTHIAZIDE 25 MG/1
25 TABLET ORAL DAILY
Qty: 90 TABLET | Refills: 0 | Status: SHIPPED | OUTPATIENT
Start: 2024-06-20

## 2024-06-20 RX ORDER — TELMISARTAN 40 MG/1
40 TABLET ORAL DAILY
Qty: 90 TABLET | Refills: 0 | Status: SHIPPED | OUTPATIENT
Start: 2024-06-20

## 2024-06-24 RX ORDER — EZETIMIBE 10 MG/1
10 TABLET ORAL DAILY
Qty: 90 TABLET | Refills: 1 | Status: SHIPPED | OUTPATIENT
Start: 2024-06-24

## 2024-07-17 ENCOUNTER — OFFICE VISIT (OUTPATIENT)
Age: 74
End: 2024-07-17

## 2024-07-17 VITALS
HEART RATE: 66 BPM | WEIGHT: 140.8 LBS | TEMPERATURE: 98.5 F | DIASTOLIC BLOOD PRESSURE: 80 MMHG | BODY MASS INDEX: 22.73 KG/M2 | SYSTOLIC BLOOD PRESSURE: 140 MMHG | RESPIRATION RATE: 16 BRPM | OXYGEN SATURATION: 99 %

## 2024-07-17 DIAGNOSIS — M94.0 ACUTE COSTOCHONDRITIS: ICD-10-CM

## 2024-07-17 DIAGNOSIS — R07.9 CHEST PAIN, UNSPECIFIED TYPE: Primary | ICD-10-CM

## 2024-07-17 RX ORDER — CELECOXIB 100 MG/1
100 CAPSULE ORAL EVERY 12 HOURS PRN
Qty: 30 CAPSULE | Refills: 0 | Status: SHIPPED | OUTPATIENT
Start: 2024-07-17

## 2024-07-17 NOTE — PROGRESS NOTES
Subjective: (As above and below)     The patient/guardian gave verbal consent to treat.        Chief Complaint   Patient presents with    Chest Pain     Left sided front and back pain and feels pain in arm when opening doors. Has been going on 3 days after 7/4 has been taken tylenol for 6 days mor/night       Peggy Garcia is a 73 y.o. female who presents for evaluation of :  hx of pleurisy, 7 pneumonia, Zentz with dry cough, left-sided chest pain that radiates to her back and her arm when opening the doors.  Symptoms started on July 4 states symptoms has not gotten worse but has not resolved either.  Patient requesting a chest x-ray to rule out pneumonia or pleural effusion states last time when she had a pleural effusion and pleurisy she did not had any symptoms.  Denies blurry vision, double vision, jaw pain.   HPI      Review of Systems    Review of Systems - negative except as listed above    Reviewed PmHx, RxHx, FmHx, SocHx, AllgHx and updated in chart.  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

## 2024-07-26 DIAGNOSIS — E03.9 HYPOTHYROIDISM, UNSPECIFIED: ICD-10-CM

## 2024-07-26 DIAGNOSIS — I10 ESSENTIAL (PRIMARY) HYPERTENSION: ICD-10-CM

## 2024-07-26 DIAGNOSIS — R73.01 IFG (IMPAIRED FASTING GLUCOSE): ICD-10-CM

## 2024-07-26 DIAGNOSIS — E03.4 HYPOTHYROIDISM DUE TO ACQUIRED ATROPHY OF THYROID: ICD-10-CM

## 2024-07-26 RX ORDER — LEVOTHYROXINE SODIUM 75 UG/1
75 TABLET ORAL
Qty: 90 TABLET | OUTPATIENT
Start: 2024-07-26

## 2024-07-26 RX ORDER — LEVOTHYROXINE SODIUM 0.07 MG/1
75 TABLET ORAL
Qty: 30 TABLET | Refills: 0 | Status: SHIPPED | OUTPATIENT
Start: 2024-07-26

## 2024-08-15 DIAGNOSIS — I10 ESSENTIAL (PRIMARY) HYPERTENSION: ICD-10-CM

## 2024-08-15 RX ORDER — ATENOLOL 100 MG/1
100 TABLET ORAL DAILY
Qty: 90 TABLET | Refills: 1 | Status: SHIPPED | OUTPATIENT
Start: 2024-08-15

## 2024-08-20 DIAGNOSIS — E03.9 HYPOTHYROIDISM, UNSPECIFIED: ICD-10-CM

## 2024-08-20 RX ORDER — LEVOTHYROXINE SODIUM 0.07 MG/1
75 TABLET ORAL
Qty: 30 TABLET | Refills: 0 | Status: SHIPPED | OUTPATIENT
Start: 2024-08-20

## 2024-08-26 ENCOUNTER — OFFICE VISIT (OUTPATIENT)
Age: 74
End: 2024-08-26
Payer: MEDICARE

## 2024-08-26 VITALS
HEIGHT: 66 IN | TEMPERATURE: 97.9 F | DIASTOLIC BLOOD PRESSURE: 72 MMHG | WEIGHT: 140 LBS | OXYGEN SATURATION: 99 % | HEART RATE: 56 BPM | BODY MASS INDEX: 22.5 KG/M2 | RESPIRATION RATE: 16 BRPM | SYSTOLIC BLOOD PRESSURE: 142 MMHG

## 2024-08-26 DIAGNOSIS — R73.01 IFG (IMPAIRED FASTING GLUCOSE): ICD-10-CM

## 2024-08-26 DIAGNOSIS — I10 HTN, GOAL BELOW 130/80: Primary | ICD-10-CM

## 2024-08-26 DIAGNOSIS — E03.4 HYPOTHYROIDISM DUE TO ACQUIRED ATROPHY OF THYROID: ICD-10-CM

## 2024-08-26 DIAGNOSIS — I25.119 ATHEROSCLEROSIS OF NATIVE CORONARY ARTERY OF NATIVE HEART WITH ANGINA PECTORIS (HCC): ICD-10-CM

## 2024-08-26 DIAGNOSIS — I10 HTN, GOAL BELOW 130/80: ICD-10-CM

## 2024-08-26 DIAGNOSIS — M54.2 NECK PAIN, CHRONIC: ICD-10-CM

## 2024-08-26 DIAGNOSIS — G89.29 NECK PAIN, CHRONIC: ICD-10-CM

## 2024-08-26 PROCEDURE — G8420 CALC BMI NORM PARAMETERS: HCPCS | Performed by: INTERNAL MEDICINE

## 2024-08-26 PROCEDURE — 4004F PT TOBACCO SCREEN RCVD TLK: CPT | Performed by: INTERNAL MEDICINE

## 2024-08-26 PROCEDURE — 3017F COLORECTAL CA SCREEN DOC REV: CPT | Performed by: INTERNAL MEDICINE

## 2024-08-26 PROCEDURE — G8399 PT W/DXA RESULTS DOCUMENT: HCPCS | Performed by: INTERNAL MEDICINE

## 2024-08-26 PROCEDURE — 1090F PRES/ABSN URINE INCON ASSESS: CPT | Performed by: INTERNAL MEDICINE

## 2024-08-26 PROCEDURE — 99214 OFFICE O/P EST MOD 30 MIN: CPT | Performed by: INTERNAL MEDICINE

## 2024-08-26 PROCEDURE — 3078F DIAST BP <80 MM HG: CPT | Performed by: INTERNAL MEDICINE

## 2024-08-26 PROCEDURE — 3077F SYST BP >= 140 MM HG: CPT | Performed by: INTERNAL MEDICINE

## 2024-08-26 PROCEDURE — 1123F ACP DISCUSS/DSCN MKR DOCD: CPT | Performed by: INTERNAL MEDICINE

## 2024-08-26 PROCEDURE — G8427 DOCREV CUR MEDS BY ELIG CLIN: HCPCS | Performed by: INTERNAL MEDICINE

## 2024-08-26 RX ORDER — CHLORTHALIDONE 25 MG/1
25 TABLET ORAL DAILY
Qty: 90 TABLET | Refills: 2 | Status: SHIPPED | OUTPATIENT
Start: 2024-08-26

## 2024-08-26 NOTE — ASSESSMENT & PLAN NOTE
Tsh currently 4 on 75 mcg dose  Cont dose check levels in jan    Orders:    T4, Free; Future    TSH; Future

## 2024-08-26 NOTE — PROGRESS NOTES
Peggy Garcia (:  1950) is a 73 y.o. female,Established patient, here for evaluation of the following chief complaint(s):  Hypertension (4 month follow up; patient is non fasting )         Assessment & Plan  HTN, goal below 130/80   ? Control  At home sbp 130-140  Will change hctz to chlorthalidone for safety reasons ( skin cancer risk) and ? Better control  Cont t he atenolol and micardis  Sees cardiology in   See me in feb    Orders:    chlorthalidone (HYGROTON) 25 MG tablet; Take 1 tablet by mouth daily    Comprehensive Metabolic Panel; Future    Hypothyroidism due to acquired atrophy of thyroid   Tsh currently 4 on 75 mcg dose  Cont dose check levels in     Orders:    T4, Free; Future    TSH; Future    IFG (impaired fasting glucose)   Hgba1c was 6.3 stable  Cont diet and exercise         Atherosclerosis of native coronary artery of native heart with angina pectoris (HCC)   No angina  Ldl  at goal 51 on meds         Neck pain, chronic   No radiculopathy  Did use m relaxer this spring  Would do pt  Avoid otc nsaids as has celebrex and ho htn    Orders:    External Referral To Physical Therapy    tiZANidine (ZANAFLEX) 4 MG tablet; Take 1 tablet by mouth nightly as needed (neck spasm)    Carotid dz-continue dopplers [facility] cardiology cont asa and statin  No follow-ups on file.       Subjective   Hypertension  6-month med check.    Hypertension currently on hydrochlorothiazide telmisartan and atenolol.  Reports home blood pressures 1 30-1 40 systolic.  No significant headaches or dizzy spells or anginal symptoms.  Discussed skin cancer risks with HCTZ will change to chlorthalidone.    Neck pain it is not radiating seems to be worse when she falls asleep on the couch in an awkward position feels like muscle spasm did use Zanaflex in the spring with some benefit does have Celebrex to use cautioned against over-the-counter NSAIDs refer to PT and refill the muscle relaxer.    Dyslipidemia currently on

## 2024-09-10 ENCOUNTER — TRANSCRIBE ORDERS (OUTPATIENT)
Facility: HOSPITAL | Age: 74
End: 2024-09-10

## 2024-09-10 DIAGNOSIS — Z12.31 VISIT FOR SCREENING MAMMOGRAM: Primary | ICD-10-CM

## 2024-09-25 DIAGNOSIS — I10 ESSENTIAL (PRIMARY) HYPERTENSION: ICD-10-CM

## 2024-09-25 RX ORDER — TELMISARTAN 40 MG/1
40 TABLET ORAL DAILY
Qty: 90 TABLET | Refills: 1 | Status: SHIPPED | OUTPATIENT
Start: 2024-09-25

## 2024-09-26 ENCOUNTER — HOSPITAL ENCOUNTER (OUTPATIENT)
Age: 74
Discharge: HOME | End: 2024-09-29
Attending: INTERNAL MEDICINE
Payer: MEDICARE

## 2024-09-26 VITALS — BODY MASS INDEX: 22.99 KG/M2 | WEIGHT: 138 LBS | HEIGHT: 65 IN

## 2024-09-26 DIAGNOSIS — E03.9 HYPOTHYROIDISM, UNSPECIFIED: ICD-10-CM

## 2024-09-26 DIAGNOSIS — Z12.31 VISIT FOR SCREENING MAMMOGRAM: ICD-10-CM

## 2024-09-26 DIAGNOSIS — J30.1 NON-SEASONAL ALLERGIC RHINITIS DUE TO POLLEN: ICD-10-CM

## 2024-09-26 PROCEDURE — 77063 BREAST TOMOSYNTHESIS BI: CPT

## 2024-09-26 RX ORDER — MONTELUKAST SODIUM 10 MG/1
10 TABLET ORAL DAILY
Qty: 90 TABLET | Refills: 3 | Status: SHIPPED | OUTPATIENT
Start: 2024-09-26

## 2024-09-27 RX ORDER — LEVOTHYROXINE SODIUM 75 UG/1
75 TABLET ORAL
Qty: 90 TABLET | Refills: 1 | Status: SHIPPED | OUTPATIENT
Start: 2024-09-27

## 2024-09-27 RX ORDER — MONTELUKAST SODIUM 10 MG/1
10 TABLET ORAL DAILY
Qty: 90 TABLET | Refills: 3 | OUTPATIENT
Start: 2024-09-27

## 2024-11-26 RX ORDER — SPIRONOLACTONE 25 MG/1
12.5 TABLET ORAL DAILY
Qty: 45 TABLET | Refills: 0 | Status: SHIPPED | OUTPATIENT
Start: 2024-11-26

## 2024-12-23 RX ORDER — EZETIMIBE 10 MG/1
10 TABLET ORAL DAILY
Qty: 90 TABLET | Refills: 1 | Status: SHIPPED | OUTPATIENT
Start: 2024-12-23

## 2024-12-23 NOTE — TELEPHONE ENCOUNTER
Requested Prescriptions     Signed Prescriptions Disp Refills    ezetimibe (ZETIA) 10 MG tablet 90 tablet 1     Sig: Take 1 tablet by mouth daily     Authorizing Provider: TABITHA MEJÍA     Ordering User: MEL MARTINEZ     Verbal order per Dr. Mejía.     Future Appointments   Date Time Provider Department Center   1/22/2025  1:00 PM [facility]   1/22/2025  2:20 PM Tabitha Mejía MD [facility]   2/25/2025 10:30 AM Mary Keita MD [facility]

## 2024-12-26 ENCOUNTER — OFFICE VISIT (OUTPATIENT)
Age: 74
End: 2024-12-26

## 2024-12-26 VITALS
OXYGEN SATURATION: 100 % | RESPIRATION RATE: 16 BRPM | SYSTOLIC BLOOD PRESSURE: 161 MMHG | DIASTOLIC BLOOD PRESSURE: 82 MMHG | WEIGHT: 142 LBS | HEART RATE: 64 BPM | TEMPERATURE: 98.7 F | BODY MASS INDEX: 23.63 KG/M2

## 2024-12-26 DIAGNOSIS — H65.01 NON-RECURRENT ACUTE SEROUS OTITIS MEDIA OF RIGHT EAR: ICD-10-CM

## 2024-12-26 DIAGNOSIS — H66.002 NON-RECURRENT ACUTE SUPPURATIVE OTITIS MEDIA OF LEFT EAR WITHOUT SPONTANEOUS RUPTURE OF TYMPANIC MEMBRANE: Primary | ICD-10-CM

## 2024-12-26 DIAGNOSIS — J06.9 VIRAL URI WITH COUGH: ICD-10-CM

## 2024-12-26 RX ORDER — ATORVASTATIN CALCIUM 10 MG/1
TABLET, FILM COATED ORAL
COMMUNITY

## 2024-12-26 RX ORDER — METHYLPREDNISOLONE 4 MG/1
TABLET ORAL
Qty: 1 KIT | Refills: 0 | Status: SHIPPED | OUTPATIENT
Start: 2024-12-26 | End: 2025-01-01

## 2024-12-26 RX ORDER — FLUTICASONE PROPIONATE 50 MCG
2 SPRAY, SUSPENSION (ML) NASAL DAILY
Qty: 32 G | Refills: 1 | Status: SHIPPED | OUTPATIENT
Start: 2024-12-26

## 2024-12-26 NOTE — PROGRESS NOTES
Patient Name: Pegyg Garcia   YOB: 1950   Patient Status: Established patient,   Chief Complaint: Ear Pain (Ear pain and nasal congestion./X 10 days )      ____________________________________________________________________________________________    External Records Reviewed: None    Limitation to History: None    Outside Historian: None    SUBJECTIVE/OBJECTIVE:  Peggy Garcia is a 74 y.o. female presents with complaint of congestion, cough, runny nose and bilateral ear pain worse to the left.  Symptoms began 10 day(s) ago and are worsening since onset.  The patient denies fever, shortness of breath, and chest pain . Patient reports taking Mucinex for symptoms without relief. No other acute symptoms reported at this time.          PAST MEDICAL HISTORY:   Medical: Pt  has a past medical history of Agatston CAC score, >400 (6/4/2018), Bilateral carotid artery stenosis (3/15/2020), CAD (coronary artery disease), native coronary artery (06/29/2018), Calcific coronary arteriosclerosis (6/4/2018), Diverticulitis (7/28/2009), Ex-smoker (6/4/2018), Hypercholesterolemia (7/28/2009), and Hypothyroid (7/28/2009).  Surgical: Pt  has a past surgical history that includes orthopedic surgery.  Family: Pt family history includes Heart Disease in her father and mother.  Social: Pt   Social History     Socioeconomic History    Marital status:      Spouse name: Not on file    Number of children: Not on file    Years of education: Not on file    Highest education level: Not on file   Occupational History    Not on file   Tobacco Use    Smoking status: Some Days     Current packs/day: 0.50     Types: Cigarettes    Smokeless tobacco: Never   Vaping Use    Vaping status: Not on file   Substance and Sexual Activity    Alcohol use: Yes     Alcohol/week: 3.3 standard drinks of alcohol     Types: 3 Standard drinks or equivalent per week     Comment: occ.    Drug use: No    Sexual activity: Not on file   Other Topics

## 2025-01-21 LAB
ALBUMIN SERPL-MCNC: 4.7 G/DL (ref 3.8–4.8)
ALBUMIN SERPL-MCNC: 4.7 G/DL (ref 3.8–4.8)
ALP SERPL-CCNC: 65 IU/L (ref 44–121)
ALP SERPL-CCNC: 65 IU/L (ref 44–121)
ALT SERPL-CCNC: 22 IU/L (ref 0–32)
ALT SERPL-CCNC: 23 IU/L (ref 0–32)
AST SERPL-CCNC: 22 IU/L (ref 0–40)
AST SERPL-CCNC: 23 IU/L (ref 0–40)
BILIRUB DIRECT SERPL-MCNC: 0.15 MG/DL (ref 0–0.4)
BILIRUB SERPL-MCNC: 0.4 MG/DL (ref 0–1.2)
BILIRUB SERPL-MCNC: 0.4 MG/DL (ref 0–1.2)
BUN SERPL-MCNC: 14 MG/DL (ref 8–27)
BUN/CREAT SERPL: 16 (ref 12–28)
CALCIUM SERPL-MCNC: 9.4 MG/DL (ref 8.7–10.3)
CHLORIDE SERPL-SCNC: 97 MMOL/L (ref 96–106)
CHOLEST SERPL-MCNC: 135 MG/DL (ref 100–199)
CO2 SERPL-SCNC: 24 MMOL/L (ref 20–29)
CREAT SERPL-MCNC: 0.86 MG/DL (ref 0.57–1)
EGFRCR SERPLBLD CKD-EPI 2021: 71 ML/MIN/1.73
GLOBULIN SER CALC-MCNC: 1.8 G/DL (ref 1.5–4.5)
GLUCOSE SERPL-MCNC: 100 MG/DL (ref 70–99)
HDLC SERPL-MCNC: 52 MG/DL
LDLC SERPL CALC-MCNC: 60 MG/DL (ref 0–99)
POTASSIUM SERPL-SCNC: 4.1 MMOL/L (ref 3.5–5.2)
PROT SERPL-MCNC: 6.5 G/DL (ref 6–8.5)
PROT SERPL-MCNC: 6.5 G/DL (ref 6–8.5)
SODIUM SERPL-SCNC: 135 MMOL/L (ref 134–144)
SPECIMEN STATUS REPORT: NORMAL
T4 FREE SERPL-MCNC: 1.3 NG/DL (ref 0.82–1.77)
TRIGL SERPL-MCNC: 132 MG/DL (ref 0–149)
TSH SERPL DL<=0.005 MIU/L-ACNC: 5.57 UIU/ML (ref 0.45–4.5)
VLDLC SERPL CALC-MCNC: 23 MG/DL (ref 5–40)

## 2025-01-21 NOTE — RESULT ENCOUNTER NOTE
Upcoming visit to review results  LDL and cholesterol look good  Lab Results       Component                Value               Date                       LDL                      60                  01/20/2025             Future Appointments  1/22/2025  1:00 PM    [facility]              [facility]  1/22/2025  2:20 PM    Monica Gan MD [facility]              [facility]  2/25/2025  10:30 AM   [facility]        [facility] DE[facility]

## 2025-01-22 ENCOUNTER — OFFICE VISIT (OUTPATIENT)
Age: 75
End: 2025-01-22
Payer: MEDICARE

## 2025-01-22 ENCOUNTER — ANCILLARY PROCEDURE (OUTPATIENT)
Age: 75
End: 2025-01-22
Payer: MEDICARE

## 2025-01-22 VITALS
RESPIRATION RATE: 16 BRPM | HEIGHT: 65 IN | HEART RATE: 55 BPM | WEIGHT: 141 LBS | BODY MASS INDEX: 23.49 KG/M2 | SYSTOLIC BLOOD PRESSURE: 120 MMHG | DIASTOLIC BLOOD PRESSURE: 80 MMHG | OXYGEN SATURATION: 98 %

## 2025-01-22 DIAGNOSIS — I65.23 BILATERAL CAROTID ARTERY STENOSIS: ICD-10-CM

## 2025-01-22 DIAGNOSIS — E78.00 HYPERCHOLESTEROLEMIA: ICD-10-CM

## 2025-01-22 DIAGNOSIS — I65.29 CAROTID STENOSIS: ICD-10-CM

## 2025-01-22 DIAGNOSIS — I25.10 CORONARY ARTERY DISEASE INVOLVING NATIVE CORONARY ARTERY OF NATIVE HEART WITHOUT ANGINA PECTORIS: Primary | ICD-10-CM

## 2025-01-22 DIAGNOSIS — R93.1 AGATSTON CAC SCORE, >400: ICD-10-CM

## 2025-01-22 DIAGNOSIS — I10 PRIMARY HYPERTENSION: ICD-10-CM

## 2025-01-22 LAB
VAS LEFT CCA DIST EDV: 17.1 CM/S
VAS LEFT CCA DIST PSV: 79.7 CM/S
VAS LEFT CCA PROX EDV: 22.6 CM/S
VAS LEFT CCA PROX PSV: 103.8 CM/S
VAS LEFT ECA EDV: 11.9 CM/S
VAS LEFT ECA PSV: 252.8 CM/S
VAS LEFT ICA DIST EDV: 22.6 CM/S
VAS LEFT ICA DIST PSV: 72.1 CM/S
VAS LEFT ICA MID EDV: 27.4 CM/S
VAS LEFT ICA MID PSV: 159.4 CM/S
VAS LEFT ICA PROX EDV: 24.8 CM/S
VAS LEFT ICA PROX PSV: 182.7 CM/S
VAS LEFT ICA/CCA PSV: 2.3 NO UNITS
VAS LEFT VERTEBRAL EDV: 9.6 CM/S
VAS LEFT VERTEBRAL PSV: 40.7 CM/S
VAS RIGHT CCA DIST EDV: 16.4 CM/S
VAS RIGHT CCA DIST PSV: 73.5 CM/S
VAS RIGHT CCA PROX EDV: 19.9 CM/S
VAS RIGHT CCA PROX PSV: 94.8 CM/S
VAS RIGHT ECA EDV: 33.7 CM/S
VAS RIGHT ECA PSV: 355.4 CM/S
VAS RIGHT ICA DIST EDV: 18 CM/S
VAS RIGHT ICA DIST PSV: 61.9 CM/S
VAS RIGHT ICA MID EDV: 18.2 CM/S
VAS RIGHT ICA MID PSV: 63.3 CM/S
VAS RIGHT ICA PROX EDV: 11.6 CM/S
VAS RIGHT ICA PROX PSV: 62.2 CM/S
VAS RIGHT ICA/CCA PSV: 0.9 NO UNITS
VAS RIGHT VERTEBRAL EDV: 10.5 CM/S
VAS RIGHT VERTEBRAL PSV: 55.6 CM/S

## 2025-01-22 PROCEDURE — 3017F COLORECTAL CA SCREEN DOC REV: CPT | Performed by: SPECIALIST

## 2025-01-22 PROCEDURE — 3079F DIAST BP 80-89 MM HG: CPT | Performed by: SPECIALIST

## 2025-01-22 PROCEDURE — G8420 CALC BMI NORM PARAMETERS: HCPCS | Performed by: SPECIALIST

## 2025-01-22 PROCEDURE — 93005 ELECTROCARDIOGRAM TRACING: CPT | Performed by: SPECIALIST

## 2025-01-22 PROCEDURE — 1090F PRES/ABSN URINE INCON ASSESS: CPT | Performed by: SPECIALIST

## 2025-01-22 PROCEDURE — 1123F ACP DISCUSS/DSCN MKR DOCD: CPT | Performed by: SPECIALIST

## 2025-01-22 PROCEDURE — 93880 EXTRACRANIAL BILAT STUDY: CPT | Performed by: INTERNAL MEDICINE

## 2025-01-22 PROCEDURE — 99214 OFFICE O/P EST MOD 30 MIN: CPT | Performed by: SPECIALIST

## 2025-01-22 PROCEDURE — 4004F PT TOBACCO SCREEN RCVD TLK: CPT | Performed by: SPECIALIST

## 2025-01-22 PROCEDURE — 93010 ELECTROCARDIOGRAM REPORT: CPT | Performed by: SPECIALIST

## 2025-01-22 PROCEDURE — 3074F SYST BP LT 130 MM HG: CPT | Performed by: SPECIALIST

## 2025-01-22 PROCEDURE — G8428 CUR MEDS NOT DOCUMENT: HCPCS | Performed by: SPECIALIST

## 2025-01-22 PROCEDURE — 1126F AMNT PAIN NOTED NONE PRSNT: CPT | Performed by: SPECIALIST

## 2025-01-22 PROCEDURE — G8399 PT W/DXA RESULTS DOCUMENT: HCPCS | Performed by: SPECIALIST

## 2025-01-22 ASSESSMENT — PATIENT HEALTH QUESTIONNAIRE - PHQ9
2. FEELING DOWN, DEPRESSED OR HOPELESS: NOT AT ALL
SUM OF ALL RESPONSES TO PHQ9 QUESTIONS 1 & 2: 0
SUM OF ALL RESPONSES TO PHQ QUESTIONS 1-9: 0
1. LITTLE INTEREST OR PLEASURE IN DOING THINGS: NOT AT ALL
SUM OF ALL RESPONSES TO PHQ QUESTIONS 1-9: 0

## 2025-01-22 NOTE — PATIENT INSTRUCTIONS
Follow-up in 1 year with a carotid Doppler the same day.  Get your fasting lipid profile done about a week prior.

## 2025-01-22 NOTE — PROGRESS NOTES
Peggy Garcia     1950       Monica Gan MD, [facility]  Date of Visit-1/22/2025   PCP is Mary Keita MD   [facility] [place]  [facility]  HPI:  Peggy Garcia is a 74 y.o. female   1 year follow up   Had carotids today:  Mild (<50%) stenosis in the right internal carotid artery.    Moderate (50-69%) stenosis in the left internal carotid artery.    Normal antegrade flow involving the bilateral vertebral artery.  No change from 1/2024 study    Today   Carotids done  Labs done, LDL is good, seen Dr Keita also with labs, all reviewed   Peggy returns today generally doing well she is pleased about the results of the carotid testing and we discussed at length.    She did not have any chest pain shortness of breath that is unusual she gets rare dizziness.  Her biggest problem is she had URI and went to urgent care and if she has some neck pain and is referred by Dr Keita to go to physical therapy.    She is aware from previous conversation with her 's visit to Dr. Jose Jerry the that vascular generally would not operate unless it was greater than 90%.  She has a significant right carotid bruit radiating to the subclavian and 1/6 cardiac murmur.    Lungs are clear and she has no pedal edema   she recently HCTZ and changed to chlorthalidone  She goes line dancing regularly and does not feel impairment that is her main form of exercise.    CAD Cath 6/29/18  LAD proximal 60%.  FFR not significant.     Pt has quit smoking since 2018 October.  Calcium score 2,039 very high-date 3/29/2018  Stress echo 5/27/2020 normal  Nuclear 7/26/2021 normal with EF 71%  Mild carotid artery disease  AAA screen 2/2024-size fine, mild plaque  Right carotid bruit with radiation to subclavicular area and 1/6 systolic murmur    Labs showed a hemoglobin A1c in 6.3% 9/18/2023 and normal LFTs.  Lipids:  Lab Results   Component Value Date    CHOL 135 01/20/2025    TRIG 132

## 2025-01-28 ENCOUNTER — OFFICE VISIT (OUTPATIENT)
Age: 75
End: 2025-01-28

## 2025-01-28 VITALS
DIASTOLIC BLOOD PRESSURE: 79 MMHG | RESPIRATION RATE: 16 BRPM | BODY MASS INDEX: 23.7 KG/M2 | SYSTOLIC BLOOD PRESSURE: 168 MMHG | TEMPERATURE: 99.2 F | WEIGHT: 142.4 LBS | HEART RATE: 67 BPM | OXYGEN SATURATION: 97 %

## 2025-01-28 DIAGNOSIS — R05.1 ACUTE COUGH: ICD-10-CM

## 2025-01-28 DIAGNOSIS — J20.9 ACUTE BRONCHITIS, UNSPECIFIED ORGANISM: Primary | ICD-10-CM

## 2025-01-28 RX ORDER — METHYLPREDNISOLONE 4 MG/1
TABLET ORAL
Qty: 21 TABLET | Refills: 0 | Status: SHIPPED | OUTPATIENT
Start: 2025-01-28 | End: 2025-02-03

## 2025-01-28 RX ORDER — ALBUTEROL SULFATE 90 UG/1
2 INHALANT RESPIRATORY (INHALATION) EVERY 4 HOURS PRN
Qty: 18 G | Refills: 0 | Status: SHIPPED | OUTPATIENT
Start: 2025-01-28

## 2025-01-28 NOTE — PROGRESS NOTES
Site: Left Upper Arm    Position: Sitting    Cuff Size: Small Adult    Pulse: 67    Resp: 16    Temp: 99.2 °F (37.3 °C)    SpO2: 97%    Weight: 64.6 kg (142 lb 6.4 oz)        Xray Result (most recent):  XR CHEST (2 VW) 01/28/2025    Narrative  CHEST X-RAY    TECHNIQUE:  PA and lateral projections.    COMPARISON: 7/17/2024.    FINDINGS:    Lungs:  Bilateral lung fields are clear.  Pleura: No evidence of effusion.  Cardiac: Cardiac silhouette is within normal limits.  Mediastinum: Mediastinum is unremarkable.  Bones: No acute osseous abnormality.  Lines/Catheter/foreign body: None.  Other: None.    Impression  No acute cardiopulmonary disease.    No significant interval change.    Electronically signed by: Obinna Blackmon MD, MPH on 01/28/2025 10:47:22 AM  /Eastern         Objective   Physical Exam  Vitals and nursing note reviewed.   Constitutional:       General: She is not in acute distress.     Appearance: Normal appearance. She is ill-appearing.   HENT:      Head: Normocephalic and atraumatic.      Right Ear: Tympanic membrane, ear canal and external ear normal. There is no impacted cerumen.      Left Ear: Tympanic membrane, ear canal and external ear normal. There is no impacted cerumen.      Nose: Congestion present. No rhinorrhea.      Mouth/Throat:      Mouth: Mucous membranes are moist.      Pharynx: Oropharynx is clear. No oropharyngeal exudate or posterior oropharyngeal erythema.   Cardiovascular:      Rate and Rhythm: Normal rate and regular rhythm.      Pulses: Normal pulses.      Heart sounds: Normal heart sounds. No murmur heard.     No friction rub. No gallop.   Pulmonary:      Effort: Pulmonary effort is normal. No respiratory distress.      Breath sounds: No wheezing, rhonchi or rales.   Musculoskeletal:      Cervical back: Normal range of motion and neck supple. No tenderness.   Lymphadenopathy:      Cervical: No cervical adenopathy.   Skin:     General: Skin is warm and dry.   Neurological:

## 2025-01-28 NOTE — PATIENT INSTRUCTIONS
Symptoms consistent with acute bronchitis  Due to the duration of your cough, low-grade temperature, I do recommend further evaluation with x-ray  Unfortunately we do not have x-ray at our facility today, I recommend you go to our [facility] location to have this done:  [address], [place] [address]   Vital signs are stable, no shortness of breath or red flag symptoms to warrant further evaluation in ED at this time  Medrol Dosepak as directed  Albuterol inhaler every 4-6 hours as needed  Lots of fluids, plenty of rest  Hot tea with honey, throat lozenges  Follow up with PCP if symptoms persist or worsen  Go to ED if you develop any shortness of breath, chest pain or if you are unable to tolerate food or fluids

## 2025-02-06 ENCOUNTER — HOSPITAL ENCOUNTER (OUTPATIENT)
Age: 75
Discharge: HOME | End: 2025-02-09
Payer: MEDICARE

## 2025-02-06 DIAGNOSIS — Z87.891 PERSONAL HISTORY OF TOBACCO USE: ICD-10-CM

## 2025-02-06 PROCEDURE — 71271 CT THORAX LUNG CANCER SCR C-: CPT

## 2025-02-16 DIAGNOSIS — I10 ESSENTIAL (PRIMARY) HYPERTENSION: ICD-10-CM

## 2025-02-16 RX ORDER — ATENOLOL 100 MG/1
100 TABLET ORAL DAILY
Qty: 90 TABLET | Refills: 1 | Status: SHIPPED | OUTPATIENT
Start: 2025-02-16

## 2025-02-25 ENCOUNTER — OFFICE VISIT (OUTPATIENT)
Facility: CLINIC | Age: 75
End: 2025-02-25
Payer: MEDICARE

## 2025-02-25 VITALS
BODY MASS INDEX: 23.99 KG/M2 | RESPIRATION RATE: 16 BRPM | WEIGHT: 144 LBS | HEIGHT: 65 IN | HEART RATE: 60 BPM | TEMPERATURE: 97.6 F | SYSTOLIC BLOOD PRESSURE: 163 MMHG | OXYGEN SATURATION: 97 % | DIASTOLIC BLOOD PRESSURE: 89 MMHG

## 2025-02-25 DIAGNOSIS — M54.2 NECK PAIN, CHRONIC: ICD-10-CM

## 2025-02-25 DIAGNOSIS — I10 HTN, GOAL BELOW 130/80: ICD-10-CM

## 2025-02-25 DIAGNOSIS — E03.4 HYPOTHYROIDISM DUE TO ACQUIRED ATROPHY OF THYROID: Primary | ICD-10-CM

## 2025-02-25 DIAGNOSIS — G89.29 NECK PAIN, CHRONIC: ICD-10-CM

## 2025-02-25 DIAGNOSIS — M47.892 OTHER OSTEOARTHRITIS OF SPINE, CERVICAL REGION: ICD-10-CM

## 2025-02-25 DIAGNOSIS — E03.4 HYPOTHYROIDISM DUE TO ACQUIRED ATROPHY OF THYROID: ICD-10-CM

## 2025-02-25 DIAGNOSIS — R73.01 IFG (IMPAIRED FASTING GLUCOSE): ICD-10-CM

## 2025-02-25 DIAGNOSIS — J47.9 BRONCHIECTASIS, UNCOMPLICATED (HCC): ICD-10-CM

## 2025-02-25 PROCEDURE — 1160F RVW MEDS BY RX/DR IN RCRD: CPT | Performed by: INTERNAL MEDICINE

## 2025-02-25 PROCEDURE — 1126F AMNT PAIN NOTED NONE PRSNT: CPT | Performed by: INTERNAL MEDICINE

## 2025-02-25 PROCEDURE — 3017F COLORECTAL CA SCREEN DOC REV: CPT | Performed by: INTERNAL MEDICINE

## 2025-02-25 PROCEDURE — 3077F SYST BP >= 140 MM HG: CPT | Performed by: INTERNAL MEDICINE

## 2025-02-25 PROCEDURE — 99214 OFFICE O/P EST MOD 30 MIN: CPT | Performed by: INTERNAL MEDICINE

## 2025-02-25 PROCEDURE — 1090F PRES/ABSN URINE INCON ASSESS: CPT | Performed by: INTERNAL MEDICINE

## 2025-02-25 PROCEDURE — 3079F DIAST BP 80-89 MM HG: CPT | Performed by: INTERNAL MEDICINE

## 2025-02-25 PROCEDURE — G8427 DOCREV CUR MEDS BY ELIG CLIN: HCPCS | Performed by: INTERNAL MEDICINE

## 2025-02-25 PROCEDURE — G8399 PT W/DXA RESULTS DOCUMENT: HCPCS | Performed by: INTERNAL MEDICINE

## 2025-02-25 PROCEDURE — 1159F MED LIST DOCD IN RCRD: CPT | Performed by: INTERNAL MEDICINE

## 2025-02-25 PROCEDURE — 1123F ACP DISCUSS/DSCN MKR DOCD: CPT | Performed by: INTERNAL MEDICINE

## 2025-02-25 PROCEDURE — 4004F PT TOBACCO SCREEN RCVD TLK: CPT | Performed by: INTERNAL MEDICINE

## 2025-02-25 PROCEDURE — G8420 CALC BMI NORM PARAMETERS: HCPCS | Performed by: INTERNAL MEDICINE

## 2025-02-25 RX ORDER — GABAPENTIN 100 MG/1
100 CAPSULE ORAL 2 TIMES DAILY
Qty: 60 CAPSULE | Refills: 4 | Status: SHIPPED | OUTPATIENT
Start: 2025-02-25 | End: 2025-07-25

## 2025-02-25 SDOH — ECONOMIC STABILITY: FOOD INSECURITY: WITHIN THE PAST 12 MONTHS, YOU WORRIED THAT YOUR FOOD WOULD RUN OUT BEFORE YOU GOT MONEY TO BUY MORE.: NEVER TRUE

## 2025-02-25 SDOH — ECONOMIC STABILITY: FOOD INSECURITY: WITHIN THE PAST 12 MONTHS, THE FOOD YOU BOUGHT JUST DIDN'T LAST AND YOU DIDN'T HAVE MONEY TO GET MORE.: NEVER TRUE

## 2025-02-25 NOTE — PROGRESS NOTES
Peggy Garcia (:  1950) is a 74 y.o. female,Established patient, here for evaluation of the following chief complaint(s):  Hypertension (6 month follow up)         Assessment & Plan  Hypothyroidism due to acquired atrophy of thyroid  Recent TSH 5.  No overt symptoms of hypothyroidism.  Continue 75 mcg dose but take it on an empty stomach to promote absorption.  Currently taking with her other morning pills    Orders:  •  T4, Free; Future  •  TSH; Future  •  TSH; Future  •  T4, Free; Future    IFG (impaired fasting glucose)  Diet and exercise    Orders:  •  Hemoglobin A1C; Future  •  Hemoglobin A1C; Future    Neck pain, chronic  Recurrent pain over the last 2 months.  Has had 3 rounds of steroids for URI issues.  Has known cervical DJD with previous surgery.  Discussed options we will do PT and start gabapentin    Orders:  •  External Referral To Physical Therapy    HTN, goal below 130/80   Elevated suspect from recent mx courses of steroids  See me  in daniel         Bronchiectasis, uncomplicated (HCC)            Other osteoarthritis of spine, cervical region       Orders:  •  gabapentin (NEURONTIN) 100 MG capsule; Take 1 capsule by mouth 2 times daily for 150 days. Intended supply: 30 days Max Daily Amount: 200 mg      No follow-ups on file.       Subjective   Hypertension  She has had a tough winter.  Right after Joey got sick with URI symptoms was given Zithromax and a Medrol Dosepak.  Because of persistent symptoms went back to urgent care and was given prednisone 40 mg a day for 5 days.  Persistent symptoms and saw Dr. Fermin who treated her with 7 days of prednisone 40 mg and 7 days of prednisone 20 mg daily.  She notes her breathing is improved no fevers or shortness of breath now.  However during this whole time she has been having pain in her neck which has worsened now that she is off the steroids.  She has not had trauma to her head.  Feels pain radiating into the occipital area and

## 2025-02-25 NOTE — ASSESSMENT & PLAN NOTE
Recent TSH 5.  No overt symptoms of hypothyroidism.  Continue 75 mcg dose but take it on an empty stomach to promote absorption.  Currently taking with her other morning pills    Orders:    T4, Free; Future    TSH; Future    TSH; Future    T4, Free; Future

## 2025-03-26 DIAGNOSIS — E03.9 HYPOTHYROIDISM, UNSPECIFIED: ICD-10-CM

## 2025-03-26 DIAGNOSIS — I10 ESSENTIAL (PRIMARY) HYPERTENSION: ICD-10-CM

## 2025-03-26 RX ORDER — TELMISARTAN 40 MG/1
40 TABLET ORAL DAILY
Qty: 90 TABLET | Refills: 0 | Status: SHIPPED | OUTPATIENT
Start: 2025-03-26

## 2025-03-26 RX ORDER — LEVOTHYROXINE SODIUM 75 UG/1
75 TABLET ORAL
Qty: 90 TABLET | Refills: 0 | Status: SHIPPED | OUTPATIENT
Start: 2025-03-26

## 2025-03-27 DIAGNOSIS — E03.9 HYPOTHYROIDISM, UNSPECIFIED: ICD-10-CM

## 2025-03-27 RX ORDER — LEVOTHYROXINE SODIUM 75 UG/1
75 TABLET ORAL
Qty: 90 TABLET | Refills: 0 | OUTPATIENT
Start: 2025-03-27

## 2025-05-13 ENCOUNTER — RESULTS FOLLOW-UP (OUTPATIENT)
Facility: CLINIC | Age: 75
End: 2025-05-13

## 2025-05-13 LAB
HBA1C MFR BLD: 6.5 % (ref 4.8–5.6)
T4 FREE SERPL-MCNC: 1.34 NG/DL (ref 0.82–1.77)
TSH SERPL DL<=0.005 MIU/L-ACNC: 4.27 UIU/ML (ref 0.45–4.5)

## 2025-05-15 ENCOUNTER — OFFICE VISIT (OUTPATIENT)
Facility: CLINIC | Age: 75
End: 2025-05-15
Payer: MEDICARE

## 2025-05-15 VITALS
DIASTOLIC BLOOD PRESSURE: 70 MMHG | RESPIRATION RATE: 16 BRPM | OXYGEN SATURATION: 99 % | WEIGHT: 139 LBS | HEIGHT: 65 IN | BODY MASS INDEX: 23.16 KG/M2 | SYSTOLIC BLOOD PRESSURE: 128 MMHG | HEART RATE: 56 BPM | TEMPERATURE: 97.6 F

## 2025-05-15 DIAGNOSIS — R73.01 IFG (IMPAIRED FASTING GLUCOSE): ICD-10-CM

## 2025-05-15 DIAGNOSIS — E03.9 ACQUIRED HYPOTHYROIDISM: Primary | ICD-10-CM

## 2025-05-15 DIAGNOSIS — E03.9 ACQUIRED HYPOTHYROIDISM: ICD-10-CM

## 2025-05-15 DIAGNOSIS — J45.31 RAD (REACTIVE AIRWAY DISEASE) WITH WHEEZING, MILD PERSISTENT, WITH ACUTE EXACERBATION: ICD-10-CM

## 2025-05-15 DIAGNOSIS — E78.5 DYSLIPIDEMIA, GOAL LDL BELOW 70: ICD-10-CM

## 2025-05-15 DIAGNOSIS — Z12.11 COLON CANCER SCREENING: ICD-10-CM

## 2025-05-15 DIAGNOSIS — I10 HTN, GOAL BELOW 130/80: ICD-10-CM

## 2025-05-15 PROCEDURE — 99214 OFFICE O/P EST MOD 30 MIN: CPT | Performed by: INTERNAL MEDICINE

## 2025-05-15 PROCEDURE — 3017F COLORECTAL CA SCREEN DOC REV: CPT | Performed by: INTERNAL MEDICINE

## 2025-05-15 PROCEDURE — 4004F PT TOBACCO SCREEN RCVD TLK: CPT | Performed by: INTERNAL MEDICINE

## 2025-05-15 PROCEDURE — 3078F DIAST BP <80 MM HG: CPT | Performed by: INTERNAL MEDICINE

## 2025-05-15 PROCEDURE — 1159F MED LIST DOCD IN RCRD: CPT | Performed by: INTERNAL MEDICINE

## 2025-05-15 PROCEDURE — 1126F AMNT PAIN NOTED NONE PRSNT: CPT | Performed by: INTERNAL MEDICINE

## 2025-05-15 PROCEDURE — G8427 DOCREV CUR MEDS BY ELIG CLIN: HCPCS | Performed by: INTERNAL MEDICINE

## 2025-05-15 PROCEDURE — G8399 PT W/DXA RESULTS DOCUMENT: HCPCS | Performed by: INTERNAL MEDICINE

## 2025-05-15 PROCEDURE — 1160F RVW MEDS BY RX/DR IN RCRD: CPT | Performed by: INTERNAL MEDICINE

## 2025-05-15 PROCEDURE — G8420 CALC BMI NORM PARAMETERS: HCPCS | Performed by: INTERNAL MEDICINE

## 2025-05-15 PROCEDURE — 1123F ACP DISCUSS/DSCN MKR DOCD: CPT | Performed by: INTERNAL MEDICINE

## 2025-05-15 PROCEDURE — 3074F SYST BP LT 130 MM HG: CPT | Performed by: INTERNAL MEDICINE

## 2025-05-15 PROCEDURE — 1090F PRES/ABSN URINE INCON ASSESS: CPT | Performed by: INTERNAL MEDICINE

## 2025-05-15 RX ORDER — PREDNISONE 20 MG/1
20 TABLET ORAL DAILY
Qty: 5 TABLET | Refills: 0 | Status: SHIPPED | OUTPATIENT
Start: 2025-05-15 | End: 2025-05-20

## 2025-05-15 NOTE — ASSESSMENT & PLAN NOTE
TSH decreased from 5.5-4.2 with taking her pill on an empty stomach in the morning.  Still on 75 mcg dose.  Discussed if she becomes progressively more cold intolerant or fatigued or constipated would increase the dose.  Currently not particularly symptomatic    Orders:    TSH; Future

## 2025-05-15 NOTE — PROGRESS NOTES
Peggy Garcia (:  1950) is a 74 y.o. female,Established patient, here for evaluation of the following chief complaint(s):  Follow-up         Assessment & Plan  Acquired hypothyroidism  TSH decreased from 5.5-4.2 with taking her pill on an empty stomach in the morning.  Still on 75 mcg dose.  Discussed if she becomes progressively more cold intolerant or fatigued or constipated would increase the dose.  Currently not particularly symptomatic    Orders:  •  TSH; Future    HTN, goal below 130/80  BP running slightly higher recently ill recently.  No change in medications continue to follow         IFG (impaired fasting glucose)  A1c 6.5.  Following excellent lifestyle.  Recheck levels in 6 months.  I would not advocate glucose lowering medications unless her A1c gets closer to 7    Orders:  •  Hemoglobin A1C; Future    RAD (reactive airway disease) with wheezing, mild persistent, with acute exacerbation  Recent COVID virus with residual bronchospasm and wheeze.  Using albuterol.  Low threshold to add steroids prednisone prescribed    Orders:  •  predniSONE (DELTASONE) 20 MG tablet; Take 1 tablet by mouth daily for 5 days    Colon cancer screening  Due for Cologuard    Orders:  •  Cologuard (Fecal DNA Colorectal Cancer Screening)    Dyslipidemia, goal LDL below 70  Continue atorvastatin 10 mg history of coronary disease.    Orders:  •  Comprehensive Metabolic Panel; Future  •  Lipid Panel; Future      No follow-ups on file.       Subjective   HPI  Seen for med check.  She had COVID upper respiratory infection.  Was not febrile.  10 days out now.  Feels improved.  Still coughing with spasmodic cough mostly nonproductive and some wheeze.  Has albuterol which does help.  Discussed given bronchospasm and history of reactive airways I would have a low threshold to use prednisone.  Prescription provided.    Thyroid she remains on 75 mcg dose of levothyroxine.  Her TSH improved with adjustment in dosing of the

## 2025-05-20 DIAGNOSIS — I10 HTN, GOAL BELOW 130/80: ICD-10-CM

## 2025-05-20 DIAGNOSIS — E78.00 HYPERCHOLESTEROLEMIA: ICD-10-CM

## 2025-05-20 RX ORDER — ROSUVASTATIN CALCIUM 20 MG/1
20 TABLET, COATED ORAL DAILY
Qty: 90 TABLET | Refills: 3 | Status: SHIPPED | OUTPATIENT
Start: 2025-05-20

## 2025-05-20 RX ORDER — CHLORTHALIDONE 25 MG/1
25 TABLET ORAL DAILY
Qty: 90 TABLET | Refills: 2 | Status: SHIPPED | OUTPATIENT
Start: 2025-05-20

## 2025-05-20 NOTE — TELEPHONE ENCOUNTER
Last visit 5/15/25  Follow up 11/6/25    Lab Results   Component Value Date     01/20/2025    K 4.1 01/20/2025    CL 97 01/20/2025    CO2 24 01/20/2025    BUN 14 01/20/2025    CREATININE 0.86 01/20/2025    GLUCOSE 100 (H) 01/20/2025    CALCIUM 9.4 01/20/2025    BILITOT 0.4 01/20/2025    BILITOT 0.4 01/20/2025    ALKPHOS 65 01/20/2025    ALKPHOS 65 01/20/2025    AST 23 01/20/2025    AST 22 01/20/2025    ALT 23 01/20/2025    ALT 22 01/20/2025    LABGLOM 71 01/20/2025    GFRAA 95 11/17/2021    AGRATIO 2.2 05/15/2023    GLOB 2.7 09/29/2023

## 2025-05-20 NOTE — TELEPHONE ENCOUNTER
Per VO by MD.    Future Appointments   Date Time Provider Department Center   11[facility]2025 11:00 [facility]   1/26/2026  1:00 PM [facility] AM[facility]   1/26/2026  2:20 PM Monica Gan MD [facility] AMB

## 2025-05-22 DIAGNOSIS — M47.892 OTHER OSTEOARTHRITIS OF SPINE, CERVICAL REGION: ICD-10-CM

## 2025-05-22 RX ORDER — GABAPENTIN 100 MG/1
100 CAPSULE ORAL 2 TIMES DAILY
Qty: 180 CAPSULE | Refills: 1 | Status: SHIPPED | OUTPATIENT
Start: 2025-05-22 | End: 2025-11-18

## 2025-06-22 DIAGNOSIS — I10 ESSENTIAL (PRIMARY) HYPERTENSION: ICD-10-CM

## 2025-06-22 DIAGNOSIS — E03.9 HYPOTHYROIDISM, UNSPECIFIED: ICD-10-CM

## 2025-06-22 RX ORDER — TELMISARTAN 40 MG/1
40 TABLET ORAL DAILY
Qty: 90 TABLET | Refills: 1 | Status: SHIPPED | OUTPATIENT
Start: 2025-06-22

## 2025-06-22 RX ORDER — LEVOTHYROXINE SODIUM 75 UG/1
75 TABLET ORAL
Qty: 90 TABLET | Refills: 1 | Status: SHIPPED | OUTPATIENT
Start: 2025-06-22

## 2025-06-25 RX ORDER — EZETIMIBE 10 MG/1
10 TABLET ORAL DAILY
Qty: 90 TABLET | Refills: 1 | Status: SHIPPED | OUTPATIENT
Start: 2025-06-25

## 2025-06-25 NOTE — TELEPHONE ENCOUNTER
Cardiologist: Dr. Mejía    Future Appointments   Date Time Provider Department Center   11/6/2025 11:00 AM Mary Keita MD [facility] DEP   1/26/2026  1:00 PM [facility] AM[facility]   1/26/2026  2:20 PM Tabitha Mejía MD [facility] AMB       Requested Prescriptions     Signed Prescriptions Disp Refills    ezetimibe (ZETIA) 10 MG tablet 90 tablet 1     Sig: Take 1 tablet by mouth daily     Authorizing Provider: TABITHA MEJÍA     Ordering User: REMBERTO WINSTON         Refills VO per Dr. Mejía.

## 2025-06-30 RX ORDER — EZETIMIBE 10 MG/1
10 TABLET ORAL DAILY
Qty: 90 TABLET | Refills: 3 | Status: SHIPPED | OUTPATIENT
Start: 2025-06-30

## 2025-06-30 NOTE — TELEPHONE ENCOUNTER
Per VO by MD.    Future Appointments   Date Time Provider Department Center   11/6/2025 11:00 AM [facility]   1/26/2026  1:00 PM [facility] AM[facility]   1/26/2026  2:20 PM Monica Gan MD [facility] AM[facility]

## 2025-07-14 LAB — NONINV COLON CA DNA+OCC BLD SCRN STL QL: NEGATIVE

## 2025-07-16 ENCOUNTER — OFFICE VISIT (OUTPATIENT)
Facility: CLINIC | Age: 75
End: 2025-07-16
Payer: MEDICARE

## 2025-07-16 VITALS
TEMPERATURE: 97.7 F | HEART RATE: 61 BPM | WEIGHT: 137.6 LBS | OXYGEN SATURATION: 100 % | BODY MASS INDEX: 22.92 KG/M2 | DIASTOLIC BLOOD PRESSURE: 60 MMHG | HEIGHT: 65 IN | SYSTOLIC BLOOD PRESSURE: 142 MMHG | RESPIRATION RATE: 16 BRPM

## 2025-07-16 DIAGNOSIS — H66.92 LEFT OTITIS MEDIA, UNSPECIFIED OTITIS MEDIA TYPE: Primary | ICD-10-CM

## 2025-07-16 DIAGNOSIS — I10 PRIMARY HYPERTENSION: ICD-10-CM

## 2025-07-16 PROCEDURE — 3017F COLORECTAL CA SCREEN DOC REV: CPT | Performed by: NURSE PRACTITIONER

## 2025-07-16 PROCEDURE — 3077F SYST BP >= 140 MM HG: CPT | Performed by: NURSE PRACTITIONER

## 2025-07-16 PROCEDURE — 1159F MED LIST DOCD IN RCRD: CPT | Performed by: NURSE PRACTITIONER

## 2025-07-16 PROCEDURE — G8399 PT W/DXA RESULTS DOCUMENT: HCPCS | Performed by: NURSE PRACTITIONER

## 2025-07-16 PROCEDURE — 99213 OFFICE O/P EST LOW 20 MIN: CPT | Performed by: NURSE PRACTITIONER

## 2025-07-16 PROCEDURE — 1160F RVW MEDS BY RX/DR IN RCRD: CPT | Performed by: NURSE PRACTITIONER

## 2025-07-16 PROCEDURE — 4004F PT TOBACCO SCREEN RCVD TLK: CPT | Performed by: NURSE PRACTITIONER

## 2025-07-16 PROCEDURE — 3078F DIAST BP <80 MM HG: CPT | Performed by: NURSE PRACTITIONER

## 2025-07-16 PROCEDURE — G8420 CALC BMI NORM PARAMETERS: HCPCS | Performed by: NURSE PRACTITIONER

## 2025-07-16 PROCEDURE — 1090F PRES/ABSN URINE INCON ASSESS: CPT | Performed by: NURSE PRACTITIONER

## 2025-07-16 PROCEDURE — G8427 DOCREV CUR MEDS BY ELIG CLIN: HCPCS | Performed by: NURSE PRACTITIONER

## 2025-07-16 PROCEDURE — 1123F ACP DISCUSS/DSCN MKR DOCD: CPT | Performed by: NURSE PRACTITIONER

## 2025-07-16 RX ORDER — CEFDINIR 300 MG/1
300 CAPSULE ORAL 2 TIMES DAILY
Qty: 14 CAPSULE | Refills: 0 | Status: SHIPPED | OUTPATIENT
Start: 2025-07-16 | End: 2025-07-23

## 2025-07-16 ASSESSMENT — ENCOUNTER SYMPTOMS
BLOOD IN STOOL: 0
SHORTNESS OF BREATH: 0
BACK PAIN: 0
NAUSEA: 0
COUGH: 0
RESPIRATORY NEGATIVE: 1
GASTROINTESTINAL NEGATIVE: 1
VOMITING: 0
ABDOMINAL PAIN: 0
EYES NEGATIVE: 1
DIARRHEA: 0
SINUS PAIN: 0
SORE THROAT: 0
EYE REDNESS: 0
SINUS PRESSURE: 0
EYE PAIN: 0
RHINORRHEA: 0
CONSTIPATION: 0
CHEST TIGHTNESS: 0

## 2025-07-16 NOTE — PROGRESS NOTES
C) 1000 MG tablet Take 2 tablets by mouth    Vitamin E 100 units TABS Take by mouth    Multiple Vitamin (MULTIVITAMIN PO) Take 1 tablet by mouth daily    aspirin 81 MG EC tablet Take by mouth daily    chlorhexidine (PERIDEX) 0.12 % solution     vitamin D (CHOLECALCIFEROL) 25 MCG (1000 UT) TABS tablet Take 1 tablet by mouth daily    esomeprazole (NEXIUM) 20 MG delayed release capsule Take 1 capsule by mouth daily     No current facility-administered medications for this visit.      Patient Active Problem List   Diagnosis    Agatston CAC score, >400    Ex-smoker    Hypercholesterolemia    Hiatal hernia    Diverticulitis    Coronary artery disease involving native coronary artery of native heart without angina pectoris    HTN (hypertension)    Bilateral carotid artery stenosis    Hypothyroid    Bronchiectasis, uncomplicated (HCC)    Angina pectoris, unspecified    Atherosclerotic heart disease of native coronary artery with unspecified angina pectoris     Past Surgical History:   Procedure Laterality Date    ORTHOPEDIC SURGERY      anterior cervical discectomy and fusion      Social History     Tobacco Use    Smoking status: Some Days     Current packs/day: 0.50     Types: Cigarettes    Smokeless tobacco: Never   Substance Use Topics    Alcohol use: Yes     Alcohol/week: 3.3 standard drinks of alcohol     Types: 3 Standard drinks or equivalent per week     Comment: occ.      Family History   Problem Relation Age of Onset    Heart Disease Mother     Heart Disease Father         chf in 90s      Physical Exam   Vitals:       BP (!) 142/60 (BP Site: Left Upper Arm, Patient Position: Sitting)   Pulse 61   Temp 97.7 °F (36.5 °C) (Oral)   Resp 16   Ht 1.651 m (5' 5\")   Wt 62.4 kg (137 lb 9.6 oz)   LMP  (LMP Unknown)   SpO2 100%   BMI 22.90 kg/m²      Physical Exam  Vitals reviewed.   Constitutional:       General: She is not in acute distress.     Appearance: Normal appearance. She is not ill-appearing.   HENT:

## 2025-08-19 ENCOUNTER — HOSPITAL ENCOUNTER (OUTPATIENT)
Facility: HOSPITAL | Age: 75
Discharge: HOME | End: 2025-08-22
Attending: INTERNAL MEDICINE
Payer: MEDICARE

## 2025-08-19 ENCOUNTER — OFFICE VISIT (OUTPATIENT)
Facility: CLINIC | Age: 75
End: 2025-08-19
Attending: INTERNAL MEDICINE
Payer: MEDICARE

## 2025-08-19 VITALS
HEIGHT: 65 IN | SYSTOLIC BLOOD PRESSURE: 150 MMHG | RESPIRATION RATE: 14 BRPM | OXYGEN SATURATION: 98 % | TEMPERATURE: 98 F | BODY MASS INDEX: 23.22 KG/M2 | HEART RATE: 59 BPM | WEIGHT: 139.4 LBS | DIASTOLIC BLOOD PRESSURE: 75 MMHG

## 2025-08-19 DIAGNOSIS — I10 HTN, GOAL BELOW 130/80: ICD-10-CM

## 2025-08-19 DIAGNOSIS — I73.9 PVD (PERIPHERAL VASCULAR DISEASE): ICD-10-CM

## 2025-08-19 DIAGNOSIS — H53.19 DISTORTED VISION: Primary | ICD-10-CM

## 2025-08-19 DIAGNOSIS — E78.5 DYSLIPIDEMIA, GOAL LDL BELOW 70: ICD-10-CM

## 2025-08-19 DIAGNOSIS — H53.19 DISTORTED VISION: ICD-10-CM

## 2025-08-19 PROCEDURE — G8420 CALC BMI NORM PARAMETERS: HCPCS | Performed by: INTERNAL MEDICINE

## 2025-08-19 PROCEDURE — G8427 DOCREV CUR MEDS BY ELIG CLIN: HCPCS | Performed by: INTERNAL MEDICINE

## 2025-08-19 PROCEDURE — 6360000004 HC RX CONTRAST MEDICATION: Performed by: INTERNAL MEDICINE

## 2025-08-19 PROCEDURE — 99214 OFFICE O/P EST MOD 30 MIN: CPT | Performed by: INTERNAL MEDICINE

## 2025-08-19 PROCEDURE — 1123F ACP DISCUSS/DSCN MKR DOCD: CPT | Performed by: INTERNAL MEDICINE

## 2025-08-19 PROCEDURE — 1090F PRES/ABSN URINE INCON ASSESS: CPT | Performed by: INTERNAL MEDICINE

## 2025-08-19 PROCEDURE — 3078F DIAST BP <80 MM HG: CPT | Performed by: INTERNAL MEDICINE

## 2025-08-19 PROCEDURE — 3017F COLORECTAL CA SCREEN DOC REV: CPT | Performed by: INTERNAL MEDICINE

## 2025-08-19 PROCEDURE — 4004F PT TOBACCO SCREEN RCVD TLK: CPT | Performed by: INTERNAL MEDICINE

## 2025-08-19 PROCEDURE — 1159F MED LIST DOCD IN RCRD: CPT | Performed by: INTERNAL MEDICINE

## 2025-08-19 PROCEDURE — 1160F RVW MEDS BY RX/DR IN RCRD: CPT | Performed by: INTERNAL MEDICINE

## 2025-08-19 PROCEDURE — 3077F SYST BP >= 140 MM HG: CPT | Performed by: INTERNAL MEDICINE

## 2025-08-19 PROCEDURE — A9579 GAD-BASE MR CONTRAST NOS,1ML: HCPCS | Performed by: INTERNAL MEDICINE

## 2025-08-19 PROCEDURE — G8399 PT W/DXA RESULTS DOCUMENT: HCPCS | Performed by: INTERNAL MEDICINE

## 2025-08-19 PROCEDURE — 70553 MRI BRAIN STEM W/O & W/DYE: CPT

## 2025-08-19 RX ORDER — GADOTERIDOL 279.3 MG/ML
13 INJECTION INTRAVENOUS
Status: COMPLETED | OUTPATIENT
Start: 2025-08-19 | End: 2025-08-19

## 2025-08-19 RX ADMIN — GADOTERIDOL 13 ML: 279.3 INJECTION, SOLUTION INTRAVENOUS at 16:56

## 2025-08-19 ASSESSMENT — PATIENT HEALTH QUESTIONNAIRE - PHQ9
SUM OF ALL RESPONSES TO PHQ QUESTIONS 1-9: 0
2. FEELING DOWN, DEPRESSED OR HOPELESS: NOT AT ALL
1. LITTLE INTEREST OR PLEASURE IN DOING THINGS: NOT AT ALL
SUM OF ALL RESPONSES TO PHQ QUESTIONS 1-9: 0

## 2025-08-20 ENCOUNTER — RESULTS FOLLOW-UP (OUTPATIENT)
Facility: CLINIC | Age: 75
End: 2025-08-20

## 2025-08-20 ENCOUNTER — TELEPHONE (OUTPATIENT)
Facility: CLINIC | Age: 75
End: 2025-08-20

## 2025-08-20 DIAGNOSIS — F43.9 STRESS: ICD-10-CM

## 2025-08-20 DIAGNOSIS — I10 HTN, GOAL BELOW 130/80: Primary | ICD-10-CM

## 2025-08-20 RX ORDER — AMLODIPINE BESYLATE 5 MG/1
5 TABLET ORAL DAILY
Qty: 30 TABLET | Refills: 0 | Status: SHIPPED | OUTPATIENT
Start: 2025-08-20

## 2025-08-20 RX ORDER — ALPRAZOLAM 0.5 MG
0.5 TABLET ORAL NIGHTLY PRN
Qty: 12 TABLET | Refills: 0 | Status: SHIPPED | OUTPATIENT
Start: 2025-08-20 | End: 2025-09-19

## 2025-08-21 ENCOUNTER — HOSPITAL ENCOUNTER (OUTPATIENT)
Facility: HOSPITAL | Age: 75
Discharge: HOME | End: 2025-08-23
Attending: INTERNAL MEDICINE
Payer: MEDICARE

## 2025-08-21 DIAGNOSIS — H53.19 DISTORTED VISION: ICD-10-CM

## 2025-08-21 LAB
VAS LEFT CCA DIST EDV: 11.3 CM/S
VAS LEFT CCA DIST PSV: 45.5 CM/S
VAS LEFT CCA PROX EDV: 13.3 CM/S
VAS LEFT CCA PROX PSV: 99.2 CM/S
VAS LEFT ECA EDV: 0 CM/S
VAS LEFT ECA PSV: 194.2 CM/S
VAS LEFT ICA DIST EDV: 15.8 CM/S
VAS LEFT ICA DIST PSV: 72.8 CM/S
VAS LEFT ICA MID EDV: 19.7 CM/S
VAS LEFT ICA MID PSV: 86.4 CM/S
VAS LEFT ICA PROX EDV: 13.7 CM/S
VAS LEFT ICA PROX PSV: 194.7 CM/S
VAS LEFT ICA/CCA PSV: 4.28 NO UNITS
VAS LEFT SUBCLAVIAN PROX EDV: 0 CM/S
VAS LEFT SUBCLAVIAN PROX PSV: 153.1 CM/S
VAS LEFT VERTEBRAL EDV: 14.18 CM/S
VAS LEFT VERTEBRAL PSV: 61.4 CM/S
VAS RIGHT CCA DIST EDV: 10.6 CM/S
VAS RIGHT CCA DIST PSV: 43.2 CM/S
VAS RIGHT CCA PROX EDV: 10.8 CM/S
VAS RIGHT CCA PROX PSV: 169.1 CM/S
VAS RIGHT ECA EDV: 0 CM/S
VAS RIGHT ECA PSV: 193.7 CM/S
VAS RIGHT ICA DIST EDV: 8.9 CM/S
VAS RIGHT ICA DIST PSV: 60.8 CM/S
VAS RIGHT ICA MID EDV: 16.6 CM/S
VAS RIGHT ICA MID PSV: 81.3 CM/S
VAS RIGHT ICA PROX EDV: 15.4 CM/S
VAS RIGHT ICA PROX PSV: 62.5 CM/S
VAS RIGHT ICA/CCA PSV: 1.9 NO UNITS
VAS RIGHT SUBCLAVIAN PROX EDV: 1.6 CM/S
VAS RIGHT SUBCLAVIAN PROX PSV: 136.4 CM/S
VAS RIGHT VERTEBRAL EDV: 11.25 CM/S
VAS RIGHT VERTEBRAL PSV: 52.3 CM/S

## 2025-08-21 PROCEDURE — 93880 EXTRACRANIAL BILAT STUDY: CPT

## 2025-08-23 DIAGNOSIS — I10 ESSENTIAL (PRIMARY) HYPERTENSION: ICD-10-CM

## 2025-08-24 RX ORDER — ATENOLOL 100 MG/1
100 TABLET ORAL DAILY
Qty: 90 TABLET | Refills: 0 | Status: SHIPPED | OUTPATIENT
Start: 2025-08-24

## 2025-09-04 ENCOUNTER — TRANSCRIBE ORDERS (OUTPATIENT)
Facility: HOSPITAL | Age: 75
End: 2025-09-04

## 2025-09-04 DIAGNOSIS — G93.9 LESION OF BRAIN: Primary | ICD-10-CM

## 2025-09-06 ENCOUNTER — OFFICE VISIT (OUTPATIENT)
Age: 75
End: 2025-09-06

## 2025-09-06 VITALS
HEART RATE: 64 BPM | SYSTOLIC BLOOD PRESSURE: 156 MMHG | BODY MASS INDEX: 23.32 KG/M2 | RESPIRATION RATE: 16 BRPM | WEIGHT: 140 LBS | HEIGHT: 65 IN | TEMPERATURE: 98 F | DIASTOLIC BLOOD PRESSURE: 62 MMHG | OXYGEN SATURATION: 100 %

## 2025-09-06 DIAGNOSIS — R52 TENDERNESS: Primary | ICD-10-CM
